# Patient Record
Sex: FEMALE | Race: WHITE | NOT HISPANIC OR LATINO | Employment: OTHER | URBAN - METROPOLITAN AREA
[De-identification: names, ages, dates, MRNs, and addresses within clinical notes are randomized per-mention and may not be internally consistent; named-entity substitution may affect disease eponyms.]

---

## 2017-01-16 ENCOUNTER — HOSPITAL ENCOUNTER (EMERGENCY)
Facility: HOSPITAL | Age: 82
Discharge: HOME | End: 2017-01-16
Attending: EMERGENCY MEDICINE | Admitting: EMERGENCY MEDICINE

## 2017-01-16 VITALS
HEIGHT: 60 IN | WEIGHT: 101 LBS | DIASTOLIC BLOOD PRESSURE: 63 MMHG | OXYGEN SATURATION: 96 % | RESPIRATION RATE: 18 BRPM | HEART RATE: 66 BPM | BODY MASS INDEX: 19.83 KG/M2 | SYSTOLIC BLOOD PRESSURE: 142 MMHG | TEMPERATURE: 99 F

## 2017-01-16 DIAGNOSIS — R55 VASOVAGAL SYNCOPE: Primary | ICD-10-CM

## 2017-01-16 DIAGNOSIS — E78.5 HYPERLIPEMIA: ICD-10-CM

## 2017-01-16 DIAGNOSIS — E86.0 DEHYDRATION: ICD-10-CM

## 2017-01-16 DIAGNOSIS — N18.9 CHRONIC RENAL FAILURE, UNSPECIFIED STAGE: ICD-10-CM

## 2017-01-16 LAB
ALBUMIN SERPL-MCNC: 4.2 G/DL (ref 3.5–5.2)
ALBUMIN/GLOB SERPL: 1.4 G/DL
ALP SERPL-CCNC: 74 U/L (ref 39–117)
ALT SERPL W P-5'-P-CCNC: 12 U/L (ref 1–33)
ANION GAP SERPL CALCULATED.3IONS-SCNC: 10.7 MMOL/L
AST SERPL-CCNC: 24 U/L (ref 1–32)
BASOPHILS # BLD AUTO: 0.04 10*3/MM3 (ref 0–0.2)
BASOPHILS NFR BLD AUTO: 0.6 % (ref 0–1.5)
BILIRUB SERPL-MCNC: 0.6 MG/DL (ref 0.1–1.2)
BUN BLD-MCNC: 35 MG/DL (ref 8–23)
BUN/CREAT SERPL: 17.5 (ref 7–25)
CALCIUM SPEC-SCNC: 9.2 MG/DL (ref 8.6–10.5)
CHLORIDE SERPL-SCNC: 97 MMOL/L (ref 98–107)
CO2 SERPL-SCNC: 30.3 MMOL/L (ref 22–29)
CREAT BLD-MCNC: 2 MG/DL (ref 0.57–1)
DEPRECATED RDW RBC AUTO: 49.7 FL (ref 37–54)
EOSINOPHIL # BLD AUTO: 0.13 10*3/MM3 (ref 0–0.7)
EOSINOPHIL NFR BLD AUTO: 2 % (ref 0.3–6.2)
ERYTHROCYTE [DISTWIDTH] IN BLOOD BY AUTOMATED COUNT: 13.5 % (ref 11.7–13)
GFR SERPL CREATININE-BSD FRML MDRD: 24 ML/MIN/1.73
GLOBULIN UR ELPH-MCNC: 2.9 GM/DL
GLUCOSE BLD-MCNC: 210 MG/DL (ref 65–99)
GLUCOSE BLDC GLUCOMTR-MCNC: 90 MG/DL (ref 70–130)
HCT VFR BLD AUTO: 40.8 % (ref 35.6–45.5)
HGB BLD-MCNC: 12.8 G/DL (ref 11.9–15.5)
HOLD SPECIMEN: NORMAL
HOLD SPECIMEN: NORMAL
IMM GRANULOCYTES # BLD: 0 10*3/MM3 (ref 0–0.03)
IMM GRANULOCYTES NFR BLD: 0 % (ref 0–0.5)
LYMPHOCYTES # BLD AUTO: 1.77 10*3/MM3 (ref 0.9–4.8)
LYMPHOCYTES NFR BLD AUTO: 27.4 % (ref 19.6–45.3)
MAGNESIUM SERPL-MCNC: 2.2 MG/DL (ref 1.6–2.4)
MCH RBC QN AUTO: 31.5 PG (ref 26.9–32)
MCHC RBC AUTO-ENTMCNC: 31.4 G/DL (ref 32.4–36.3)
MCV RBC AUTO: 100.5 FL (ref 80.5–98.2)
MONOCYTES # BLD AUTO: 0.63 10*3/MM3 (ref 0.2–1.2)
MONOCYTES NFR BLD AUTO: 9.8 % (ref 5–12)
NEUTROPHILS # BLD AUTO: 3.89 10*3/MM3 (ref 1.9–8.1)
NEUTROPHILS NFR BLD AUTO: 60.2 % (ref 42.7–76)
PLATELET # BLD AUTO: 132 10*3/MM3 (ref 140–500)
PMV BLD AUTO: 9.6 FL (ref 6–12)
POTASSIUM BLD-SCNC: 3.4 MMOL/L (ref 3.5–5.2)
PROT SERPL-MCNC: 7.1 G/DL (ref 6–8.5)
RBC # BLD AUTO: 4.06 10*6/MM3 (ref 3.9–5.2)
SODIUM BLD-SCNC: 138 MMOL/L (ref 136–145)
TROPONIN T SERPL-MCNC: <0.01 NG/ML (ref 0–0.03)
WBC NRBC COR # BLD: 6.46 10*3/MM3 (ref 4.5–10.7)
WHOLE BLOOD HOLD SPECIMEN: NORMAL
WHOLE BLOOD HOLD SPECIMEN: NORMAL

## 2017-01-16 PROCEDURE — 82962 GLUCOSE BLOOD TEST: CPT

## 2017-01-16 PROCEDURE — 93005 ELECTROCARDIOGRAM TRACING: CPT | Performed by: EMERGENCY MEDICINE

## 2017-01-16 PROCEDURE — 93010 ELECTROCARDIOGRAM REPORT: CPT | Performed by: INTERNAL MEDICINE

## 2017-01-16 PROCEDURE — 83735 ASSAY OF MAGNESIUM: CPT | Performed by: EMERGENCY MEDICINE

## 2017-01-16 PROCEDURE — 80053 COMPREHEN METABOLIC PANEL: CPT | Performed by: EMERGENCY MEDICINE

## 2017-01-16 PROCEDURE — 84484 ASSAY OF TROPONIN QUANT: CPT | Performed by: EMERGENCY MEDICINE

## 2017-01-16 PROCEDURE — 96360 HYDRATION IV INFUSION INIT: CPT

## 2017-01-16 PROCEDURE — 85025 COMPLETE CBC W/AUTO DIFF WBC: CPT | Performed by: EMERGENCY MEDICINE

## 2017-01-16 PROCEDURE — 96361 HYDRATE IV INFUSION ADD-ON: CPT

## 2017-01-16 PROCEDURE — 99284 EMERGENCY DEPT VISIT MOD MDM: CPT

## 2017-01-16 RX ORDER — SODIUM CHLORIDE 0.9 % (FLUSH) 0.9 %
10 SYRINGE (ML) INJECTION AS NEEDED
Status: DISCONTINUED | OUTPATIENT
Start: 2017-01-16 | End: 2017-01-16 | Stop reason: HOSPADM

## 2017-01-16 RX ORDER — ROSUVASTATIN CALCIUM 5 MG/1
TABLET, COATED ORAL
Qty: 15 TABLET | Refills: 0 | Status: SHIPPED | OUTPATIENT
Start: 2017-01-16 | End: 2017-02-21 | Stop reason: SDUPTHER

## 2017-01-16 RX ORDER — POTASSIUM CHLORIDE 1.5 G/1.77G
20 POWDER, FOR SOLUTION ORAL ONCE
Status: COMPLETED | OUTPATIENT
Start: 2017-01-16 | End: 2017-01-16

## 2017-01-16 RX ADMIN — POTASSIUM CHLORIDE 20 MEQ: 1.5 POWDER, FOR SOLUTION ORAL at 13:06

## 2017-01-16 RX ADMIN — SODIUM CHLORIDE 1000 ML: 9 INJECTION, SOLUTION INTRAVENOUS at 12:48

## 2017-01-16 RX ADMIN — SODIUM CHLORIDE 500 ML: 9 INJECTION, SOLUTION INTRAVENOUS at 12:13

## 2017-01-16 NOTE — ED PROVIDER NOTES
" EMERGENCY DEPARTMENT ENCOUNTER    CHIEF COMPLAINT  Chief Complaint: Dizziness  History given by: Pt  History limited by: N/A  Room Number: 12/12  PMD: Louis Valdez MD      HPI:  Pt is a 85 y.o. female who presents complaining of dizziness and syncopal episode earlier this morning. Pt's spouse reports the pt was \"wobbling\" this morning and he had to help her to the floor. Spouse estimates the pt experienced a syncopal episode lasting 5-10 seconds. He denies head injury on the pt's behalf during the event. Spouse reports several similar episodes over the past month. He checked the pt's BP following the event, which was 70/56. It was rechecked shortly later when it was 90/60. Pt has a history of hypertension, but has not taken medication today. Her most recent dose was last night. She reports eating a meal last night and this morning. Pt states she felt fine when she woke this morning and does not recall experiencing light-headedness, diaphoresis, or any other symptoms prior to syncope. She also denies black/bloody stools, nausea, and vomiting. Pt's spouse also notes recent chest congestion and cough on the pt's behalf, which seemed to have resolved. Pt states she feels fine currently, believes she is at baseline, and did not think she needed to come here today. Pt does not use tobacco or alcohol.  is her nephrologist and Dr. Sonu Yip is her Cardiologist. She takes Coreg and Nifedipine 30 mg twice per day.    Duration: Occurred earlier today  Onset: Sudden  Timing: Single episode  Location: Neurological  Radiation: None  Quality: Syncope  Intensity/Severity: Mild  Progression: Resolved currently  Associated Symptoms: Dizziness  Aggravating Factors: Nothing  Alleviating Factors: None tried  Previous Episodes: Yes, several similar episodes over the past month  Treatment before arrival: None specified    PAST MEDICAL HISTORY  Active Ambulatory Problems     Diagnosis Date Noted   • Atypical chest pain " 03/23/2016   • Benign essential HTN 03/23/2016   • Bronchitis 03/23/2016   • Atherosclerosis of coronary artery 03/23/2016   • Carpal tunnel syndrome 03/23/2016   • Fatigue 03/23/2016   • Arthralgia of hip 03/23/2016   • HLD (hyperlipidemia) 03/23/2016   • Amyotrophia 03/23/2016   • Adult hypothyroidism 03/23/2016   • Amnesia 03/23/2016   • Pharyngeal inflammation 03/23/2016   • Basal cell papilloma 03/23/2016   • Forgetfulness 03/23/2016   • Chronic kidney disease, stage III (moderate) 06/24/2016   • Night sweats 08/22/2016   • Hot flashes 08/22/2016   • Urine frequency 08/22/2016   • Gastroesophageal reflux disease with esophagitis 10/13/2016   • Dyslipidemia 10/13/2016   • Chronic gout 10/13/2016     Resolved Ambulatory Problems     Diagnosis Date Noted   • BP (high blood pressure) 03/23/2016     Past Medical History   Diagnosis Date   • Chronic kidney disease    • Coronary atherosclerosis    • Hypercholesteremia    • Hypertension        PAST SURGICAL HISTORY  Past Surgical History   Procedure Laterality Date   • Partial hysterectomy         FAMILY HISTORY  History reviewed. No pertinent family history.    SOCIAL HISTORY  Social History     Social History   • Marital status:      Spouse name: N/A   • Number of children: N/A   • Years of education: N/A     Occupational History   • Not on file.     Social History Main Topics   • Smoking status: Never Smoker   • Smokeless tobacco: Not on file   • Alcohol use No   • Drug use: Not on file   • Sexual activity: Not on file     Other Topics Concern   • Not on file     Social History Narrative       ALLERGIES  Statins and Sulfa antibiotics    REVIEW OF SYSTEMS  Review of Systems   Constitutional: Negative for chills and fever.   HENT: Negative for sore throat and trouble swallowing.    Eyes: Negative for visual disturbance.   Respiratory: Negative for cough and shortness of breath.    Cardiovascular: Negative for chest pain, palpitations and leg swelling.    Gastrointestinal: Negative for abdominal pain, diarrhea and vomiting.   Endocrine: Negative.    Genitourinary: Negative for decreased urine volume, dysuria and frequency.   Musculoskeletal: Negative for neck pain.   Skin: Negative for rash.   Allergic/Immunologic: Negative.    Neurological: Positive for dizziness and syncope. Negative for weakness, light-headedness, numbness and headaches.   Hematological: Negative.    Psychiatric/Behavioral: Negative.    All other systems reviewed and are negative.      PHYSICAL EXAM  ED Triage Vitals   Temp Heart Rate Resp BP SpO2   01/16/17 1112 01/16/17 1112 01/16/17 1112 01/16/17 1122 01/16/17 1112   99 °F (37.2 °C) 72 16 155/64 97 %      Temp src Heart Rate Source Patient Position BP Location FiO2 (%)   -- -- 01/16/17 1122 01/16/17 1122 --     Sitting Right arm        Physical Exam   Constitutional: She is oriented to person, place, and time and well-developed, well-nourished, and in no distress. No distress.   HENT:   Head: Normocephalic and atraumatic.   Mouth/Throat: Oropharynx is clear and moist.   Eyes: EOM are normal. Pupils are equal, round, and reactive to light.   Conjunctiva not pale   Neck: Normal range of motion. Neck supple.   Cardiovascular: Normal rate, regular rhythm and normal heart sounds.    No murmur heard.  Pulmonary/Chest: Effort normal and breath sounds normal. No respiratory distress.   Abdominal: Soft. There is no tenderness. There is no rebound and no guarding.   Genitourinary: Rectal exam shows guaiac negative stool.   Genitourinary Comments: Pt has normal rectal tone and heme negative, normal stool.   Musculoskeletal: Normal range of motion. She exhibits no edema.   Neurological: She is alert and oriented to person, place, and time. She has normal sensation and normal strength.   Skin: Skin is warm and dry. No rash noted.   Psychiatric: Mood and affect normal.   Nursing note and vitals reviewed.      LAB RESULTS  Lab Results (last 24 hours)      Procedure Component Value Units Date/Time    CBC & Differential [[id number]] Collected:  01/16/17 1128    Specimen:  Blood Updated:  01/16/17 1158    Narrative:       The following orders were created for panel order CBC & Differential.  Procedure                               Abnormality         Status                     ---------                               -----------         ------                     CBC Auto Differential[[id number]]         Abnormal            Final result                 Please view results for these tests on the individual orders.    Comprehensive Metabolic Panel [[id number]]  (Abnormal) Collected:  01/16/17 1128    Specimen:  Blood Updated:  01/16/17 1213     Glucose 210 (H) mg/dL      BUN 35 (H) mg/dL      Creatinine 2.00 (H) mg/dL      Sodium 138 mmol/L      Potassium 3.4 (L) mmol/L      Chloride 97 (L) mmol/L      CO2 30.3 (H) mmol/L      Calcium 9.2 mg/dL      Total Protein 7.1 g/dL      Albumin 4.20 g/dL      ALT (SGPT) 12 U/L      AST (SGOT) 24 U/L      Alkaline Phosphatase 74 U/L      Total Bilirubin 0.6 mg/dL      eGFR Non African Amer 24 (L) mL/min/1.73      Globulin 2.9 gm/dL      A/G Ratio 1.4 g/dL      BUN/Creatinine Ratio 17.5      Anion Gap 10.7 mmol/L     Narrative:       The MDRD GFR formula is only valid for adults with stable renal function between ages 18 and 70.    Magnesium [[id number]]  (Normal) Collected:  01/16/17 1128    Specimen:  Blood Updated:  01/16/17 1213     Magnesium 2.2 mg/dL     Troponin [[id number]]  (Normal) Collected:  01/16/17 1128    Specimen:  Blood Updated:  01/16/17 1213     Troponin T <0.010 ng/mL     Narrative:       Troponin T Reference Ranges:  Less than 0.03 ng/mL:    Negative for AMI  0.03 to 0.09 ng/mL:      Indeterminant for AMI  Greater than 0.09 ng/mL: Positive for AMI    CBC Auto Differential [[id number]]  (Abnormal) Collected:  01/16/17 1128    Specimen:  Blood Updated:  01/16/17 1158     WBC 6.46 10*3/mm3      RBC 4.06 10*6/mm3      Hemoglobin  12.8 g/dL      Hematocrit 40.8 %      .5 (H) fL      MCH 31.5 pg      MCHC 31.4 (L) g/dL      RDW 13.5 (H) %      RDW-SD 49.7 fl      MPV 9.6 fL      Platelets 132 (L) 10*3/mm3      Neutrophil % 60.2 %      Lymphocyte % 27.4 %      Monocyte % 9.8 %      Eosinophil % 2.0 %      Basophil % 0.6 %      Immature Grans % 0.0 %      Neutrophils, Absolute 3.89 10*3/mm3      Lymphocytes, Absolute 1.77 10*3/mm3      Monocytes, Absolute 0.63 10*3/mm3      Eosinophils, Absolute 0.13 10*3/mm3      Basophils, Absolute 0.04 10*3/mm3      Immature Grans, Absolute 0.00 10*3/mm3     POC Glucose Fingerstick [[id number]]  (Normal) Collected:  01/16/17 1347    Specimen:  Blood Updated:  01/16/17 1359     Glucose 90 mg/dL     Narrative:       Meter: [id number] : [id number] Gio Herrera I ordered the above labs and reviewed the results    EKG         EKG time: 11:45 AM  Rhythm/Rate: NSR at 61 bpm w/ PACs  P waves and ID: Normal  QRS, axis: LAD  ST and T waves: Normal  Interpreted contemporaneously by me and independently viewed.  Changed compared to prior (June 16th, 2016), LAD is new.    PROCEDURES  Procedures      PROGRESS AND CONSULTS  ED Course   11:30 AM:  Vitals: BP: 155/64 HR: 72 Temp: 99 °F (37.2 °C) O2 sat: 97%  D/w pt plan for labs and EKG for further evaluation. Ordered IVF for hydration. Pt understands and agrees with the plan, all questions answered.    12:46 PM:  Tech reports pt was orthostatic.    1:01 PM:  Vitals: BP: 136/55 HR: 66 Temp: 99 °F (37.2 °C) O2 sat: 97%  Rechecked pt. Pt is resting comfortably. Discussed that since the pt was orthostatic I need to perform a rectal exam. Performed rectal exam, which showed normal tone and heme negative, normal stool. Pt understands and agrees with the plan, all questions answered.    2:02 PM:  Pt's orthostatics are normal.    MEDICAL DECISION MAKING  Results were reviewed/discussed with the patient and they were also made aware of online access. Pt also  made aware that some labs, such as cultures, will not be resulted during ER visit and follow up with PMD is necessary.     MDM  Number of Diagnoses or Management Options     Amount and/or Complexity of Data Reviewed  Clinical lab tests: ordered and reviewed  Tests in the medicine section of CPT®: ordered and reviewed  Obtain history from someone other than the patient: yes  Independent visualization of images, tracings, or specimens: yes    Patient Progress  Patient progress: stable         DIAGNOSIS  Final diagnoses:   Vasovagal syncope   Dehydration   Chronic renal failure, unspecified stage       DISPOSITION  DISCHARGE    Patient discharged in stable condition.    Reviewed implications of results, diagnosis, meds, responsibility to follow up, warning signs and symptoms of possible worsening, potential complications and reasons to return to ER, including any new or worsening symptoms.    Patient/Family voiced understanding of above instructions.    Discussed plan for discharge, as there is no emergent indication for admission.  Pt/family is agreeable and understands need for follow up and repeat testing.  Pt is aware that discharge does not mean that nothing is wrong but it indicates no emergency is present that requires admission and they must continue care with follow-up as given below or physician of their choice.     FOLLOW-UP  Louis Valdez MD  [address] [id number] [phone]    Schedule an appointment as soon as possible for a visit      Sonu Yip Jr., MD  [address] [id number] [phone]    Schedule an appointment as soon as possible for a visit  If symptoms worsen         Medication List      Changed          carvedilol 25 MG tablet   Commonly known as:  COREG   TAKE ONE TABLET BY MOUTH TWICE A DAY   What changed:  Another medication with the same name was removed. Continue   taking this medication, and follow the directions you see here.          Stop          CRESTOR 5 MG tablet   Generic drug:  rosuvastatin       nitrofurantoin (macrocrystal-monohydrate) 100 MG capsule   Commonly known as:  MACROBID           Latest Documented Vital Signs:  As of 8:12 PM  BP- 142/63 HR- 66 Temp- 99 °F (37.2 °C) O2 sat- 96%    --  Documentation assistance provided by chris Westbrook for Dr. Farnsworth.  Information recorded by the scribe was done at my direction and has been verified and validated by me.     Anson Westbrook  01/16/17 1418       Omar Farnsworth MD  01/16/17 2013

## 2017-01-16 NOTE — DISCHARGE INSTRUCTIONS
Hold your procardia tonight but restart it tomorrow.  Drink plenty of fluids and rest today.  Please return to the ED if symptoms return, especially if you pass out.

## 2017-01-18 ENCOUNTER — TELEPHONE (OUTPATIENT)
Dept: SOCIAL WORK | Facility: HOSPITAL | Age: 82
End: 2017-01-18

## 2017-01-18 NOTE — TELEPHONE ENCOUNTER
ER F/U phone call:   Pt states that she is doing well, to see cardiologist 1-20-17. No other questions or concerns voiced at this time. Samaria Ramirez RN\

## 2017-01-20 ENCOUNTER — OFFICE VISIT (OUTPATIENT)
Dept: CARDIOLOGY | Facility: CLINIC | Age: 82
End: 2017-01-20

## 2017-01-20 VITALS
DIASTOLIC BLOOD PRESSURE: 68 MMHG | SYSTOLIC BLOOD PRESSURE: 140 MMHG | BODY MASS INDEX: 19.83 KG/M2 | HEIGHT: 60 IN | WEIGHT: 101 LBS | HEART RATE: 68 BPM

## 2017-01-20 DIAGNOSIS — E78.2 MIXED HYPERLIPIDEMIA: ICD-10-CM

## 2017-01-20 DIAGNOSIS — R53.82 CHRONIC FATIGUE: ICD-10-CM

## 2017-01-20 DIAGNOSIS — E03.9 ADULT HYPOTHYROIDISM: ICD-10-CM

## 2017-01-20 DIAGNOSIS — K21.00 GASTROESOPHAGEAL REFLUX DISEASE WITH ESOPHAGITIS: ICD-10-CM

## 2017-01-20 DIAGNOSIS — R23.2 HOT FLASHES: ICD-10-CM

## 2017-01-20 DIAGNOSIS — R55 SYNCOPE, UNSPECIFIED SYNCOPE TYPE: Primary | ICD-10-CM

## 2017-01-20 DIAGNOSIS — I10 BENIGN ESSENTIAL HTN: ICD-10-CM

## 2017-01-20 DIAGNOSIS — I25.10 ATHEROSCLEROSIS OF NATIVE CORONARY ARTERY OF NATIVE HEART WITHOUT ANGINA PECTORIS: ICD-10-CM

## 2017-01-20 PROCEDURE — 93000 ELECTROCARDIOGRAM COMPLETE: CPT | Performed by: INTERNAL MEDICINE

## 2017-01-20 PROCEDURE — 99214 OFFICE O/P EST MOD 30 MIN: CPT | Performed by: INTERNAL MEDICINE

## 2017-01-20 NOTE — PROGRESS NOTES
[facility]  Cardiology Office Visit Note        Subjective:     Encounter Date:2017      Patient ID: Christine Wharton   Age: 85 y.o.  Sex: female  :  1932  MRN: [id number]             Date of Office Visit: 2017  Encounter Provider: Yaquelin Vogel MD  Place of Service: [facility]     .    Chief Complaint:  History of Present Illness    The following portions of the patient's history were reviewed and updated as appropriate: allergies, current medications, past family history, past medical history, past social history, past surgical history and problem list.    Review of Systems   Constitution: Negative for weight gain.   HENT: Negative for congestion, headaches, hearing loss and sore throat.    Eyes: Negative for blurred vision.   Cardiovascular: Positive for near-syncope and syncope. Negative for chest pain, claudication, cyanosis, dyspnea on exertion, irregular heartbeat, leg swelling, orthopnea, palpitations and paroxysmal nocturnal dyspnea.   Respiratory: Negative for cough, shortness of breath and snoring.    Endocrine: Negative for cold intolerance.   Hematologic/Lymphatic: Negative for adenopathy. Does not bruise/bleed easily.   Skin: Negative for rash.   Musculoskeletal: Negative for back pain.   Gastrointestinal: Negative for abdominal pain, change in bowel habit, constipation and diarrhea.   Genitourinary: Negative for dysuria, frequency, hematuria and hesitancy.   Neurological: Positive for dizziness and loss of balance. Negative for excessive daytime sleepiness and focal weakness.   Psychiatric/Behavioral: Positive for memory loss. The patient is nervous/anxious.    Allergic/Immunologic: Negative for hives.   All other systems reviewed and are negative.        ECG 12 Lead  Date/Time: 2017 7:48 PM  Performed by: YAQUELIN VOGEL JR  Authorized by: YAQUELIN VOGEL JR   Comparison: compared with previous ECG   Similar to  previous ECG  Rhythm: sinus rhythm  BPM: 63  Other findings: PRWP  Q waves: V1 and V2  Clinical impression: abnormal ECG                       HPI     The patient is a 85-year-old white female with history of coronary artery disease, with severe stenosis of a nondominant right coronary artery and severe stenosis of an obtuse marginal by cardiac catheterization, history of hypertension, hyperlipidemia, hypothyroidism, carotid stenoses, presents for cardiac follow-up.  LS her in the office in May 2016.  She underwent a routine treadmill test to evaluate for progression of coronary artery disease.  The ECG and Cardiolite portion of the chest were normal.  Ejection fraction 73%.  Her last echocardiogram was performed in November 2014 showing normal left turgor systolic function, mild aortic regurgitation and diastolic dysfunctional left ventricle.  Her last ECG was performed a few days ago in the emergency room when she was seen for a syncopal episode.  That ECG shows sinus rhythm.  She apparently has passed out 3 times in the past 5 weeks and was caught by her  each time, who saw her wobble.  She does not remember any symptoms before the episodes other than her vision blurring or darkening..  She has a very poor memory.  She does not remember any sweating or shortness of breath or palpitations or chest pain.  No vertigo.    The first episode occurred while she was sitting at the kitchen table.  The second episode occurred while she was combing her hair in the bathroom, standing up.  The third episode occurred 4 days ago which went to the emergency room.  She was standing in a sink in the kitchen and her  noticed that she was unsteady and then she fell for any stopped her fall.    Cardiac review systems: No chest pain.  No PND, orthopnea or pedal edema.  No palpitations.    Cardiac risk factors: Positive for hyperlipidemia, hypertension.  No diabetes.  Positive family history early CAD.  No  "smoking.            Past Medical History   Diagnosis Date   • Chronic kidney disease      Stage 3 (moderate)   • Coronary atherosclerosis      CAD   • Hypercholesteremia    • Hypertension        Past Surgical History   Procedure Laterality Date   • Partial hysterectomy         Social History     Social History   • Marital status:      Spouse name: N/A   • Number of children: N/A   • Years of education: N/A     Occupational History   • Not on file.     Social History Main Topics   • Smoking status: Never Smoker   • Smokeless tobacco: Not on file   • Alcohol use No   • Drug use: Not on file   • Sexual activity: Not on file     Other Topics Concern   • Not on file     Social History Narrative       No family history on file.        Scheduled Meds:  Current Outpatient Prescriptions on File Prior to Visit   Medication Sig Dispense Refill   • allopurinol (ZYLOPRIM) 100 MG tablet Take 1 tablet by mouth daily. 90 tablet 1   • aspirin 81 MG tablet Take 1 tablet by mouth daily.     • carvedilol (COREG) 25 MG tablet TAKE ONE TABLET BY MOUTH TWICE A  tablet 0   • carvedilol (COREG) 25 MG tablet Take 25 mg by mouth 3 (Three) Times a Day.     • chlorthalidone (HYGROTEN) 25 MG tablet 12.5 mg Daily.     • cholecalciferol (VITAMIN D3) 1000 UNITS tablet Take 1 tablet by mouth daily.     • NIFEdipine XL (PROCARDIA XL) 30 MG 24 hr tablet TAKE ONE TABLET BY MOUTH TWICE A DAY 60 tablet 3   • nitrofurantoin, macrocrystal-monohydrate, (MACROBID) 100 MG capsule Take 1 capsule by mouth 2 (two) times a day. 14 capsule 0   • Potassium Chloride 40 MEQ/15ML (20%) solution Take 15 mL by mouth Daily. 600 mL 3   • rosuvastatin (CRESTOR) 5 MG tablet TAKE ONE-HALF TABLET BY MOUTH DAILY 15 tablet 0   • SYNTHROID 125 MCG tablet TAKE ONE-HALF TABLET BY MOUTH DAILY 45 tablet 2     No current facility-administered medications on file prior to visit.        Visit Vitals   • /68   • Pulse 68   • Ht 60\" (152.4 cm)   • Wt 101 lb (45.8 kg) "   • BMI 19.73 kg/m2       Objective:     Physical Exam   Constitutional: She appears well-developed. She appears distressed.   HENT:   Head: Normocephalic and atraumatic.   Right Ear: External ear normal.   Left Ear: External ear normal.   Mouth/Throat: Oropharynx is clear and moist. No oropharyngeal exudate.   Eyes: Conjunctivae and EOM are normal. Pupils are equal, round, and reactive to light. No scleral icterus.   Neck: Normal range of motion. Neck supple. No JVD present. No tracheal deviation present. No thyromegaly present.   Cardiovascular: Normal rate, regular rhythm, S1 normal, S2 normal, normal heart sounds and intact distal pulses.  PMI is not displaced.  Exam reveals no gallop, no distant heart sounds, no friction rub and no decreased pulses.    No murmur heard.  1/6 systolic murmur at the left sternal border     Pulmonary/Chest: Effort normal and breath sounds normal. No accessory muscle usage. No respiratory distress. She has no wheezes. She has no rales. She exhibits no tenderness.   Abdominal: Soft. Bowel sounds are normal. She exhibits no distension and no mass. There is no tenderness. There is no rebound and no guarding.   Musculoskeletal: Normal range of motion. She exhibits no edema, tenderness or deformity.   Lymphadenopathy:     She has no cervical adenopathy.   Neurological: She is alert. She has normal reflexes. No cranial nerve deficit. Coordination normal.   Skin: Skin is dry. No rash noted. She is not diaphoretic. No erythema. No pallor.   Psychiatric: She has a normal mood and affect.             Lab Review:               Lab Review:         Lab Review     Lab Results   Component Value Date    CHOL 156 10/14/2016    CHOL 156 05/19/2016     Lab Results   Component Value Date    HDL 68 (H) 10/14/2016    HDL 72 (H) 08/18/2016    HDL 69 (H) 05/19/2016     Lab Results   Component Value Date    LDL 77 08/18/2016     (H) 05/06/2015     Lab Results   Component Value Date    TRIG 71  10/14/2016    TRIG 68 08/18/2016    TRIG 100 05/19/2016     No components found for: CHOLHDL  Lab Results   Component Value Date    GLUCOSE 210 (H) 01/16/2017    BUN 35 (H) 01/16/2017    CREATININE 2.00 (H) 01/16/2017    EGFRIFNONA 24 (L) 01/16/2017    EGFRIFAFRI 41 (L) 08/18/2016    BCR 17.5 01/16/2017    CO2 30.3 (H) 01/16/2017    CALCIUM 9.2 01/16/2017    PROTENTOTREF 7.0 08/18/2016    ALBUMIN 4.20 01/16/2017    LABIL2 1.4 01/16/2017    AST 24 01/16/2017    ALT 12 01/16/2017     Lab Results   Component Value Date    GLUCOSE 210 (H) 01/16/2017    CALCIUM 9.2 01/16/2017     01/16/2017    K 3.4 (L) 01/16/2017    CO2 30.3 (H) 01/16/2017    CL 97 (L) 01/16/2017    BUN 35 (H) 01/16/2017    CREATININE 2.00 (H) 01/16/2017    EGFRIFAFRI 41 (L) 08/18/2016    EGFRIFNONA 24 (L) 01/16/2017    BCR 17.5 01/16/2017    ANIONGAP 10.7 01/16/2017     Lab Results   Component Value Date    WBC 6.46 01/16/2017    HGB 12.8 01/16/2017    HCT 40.8 01/16/2017    .5 (H) 01/16/2017     (L) 01/16/2017     No results found for: DDIMER  Lab Results   Component Value Date    TSH 3.470 08/18/2016     Lab Results   Component Value Date    CKTOTAL 71 05/19/2016     No results found for: DIGOXIN  Lab Results   Component Value Date    CKTOTAL 71 05/19/2016    TROPONINT <0.010 01/16/2017     No results found for: INR, PROTIME  Estimated Creatinine Clearance: 14.8 mL/min (by C-G formula based on Cr of 2).    Assessment:          Diagnosis Plan   1. Syncope, unspecified syncope type  ECG 12 Lead   2. Atherosclerosis of native coronary artery of native heart without angina pectoris     3. Benign essential HTN     4. Mixed hyperlipidemia     5. Gastroesophageal reflux disease with esophagitis     6. Adult hypothyroidism     7. Hot flashes     8. Chronic fatigue       The patient is complaining of dizziness a Cydney syncope.  She really has little warning.  I told her she needs to get to the floor so that she can lie flat.  She needs to  put on some weight.  She weighs 102 pounds now.  She weighed 110 pounds a couple months ago.  She used to weigh 125 pounds.  She's lost her appetite.  She needs to drink more fluid mostly with electrolytes such as Gatorade.  She can drink Ensure.  I may need to give her compression stockings.    I will stop her chlorthalidone.  She can take it when necessary edema.  I will also stop the potassium supplement.    I will obtain blood work to evaluate the symptoms    I will get an echocardiogram.    Her blood pressure illness been as low as 74/52.  She doesn't take her Coreg or blood pressure medications when her blood pressure was too low.  I will also stop one dose of nifedipine.  Her heart rate runs in the 50s and 60s.  Make a back on the Coreg as well.  She'll call back in a couple weeks with a blood pressure and heart rate readings.       Assessment and Plan:    Christine was seen today for hypertension and coronary artery disease.    Diagnoses and all orders for this visit:    Syncope, unspecified syncope type  -     ECG 12 Lead    Atherosclerosis of native coronary artery of native heart without angina pectoris    Benign essential HTN    Mixed hyperlipidemia    Gastroesophageal reflux disease with esophagitis    Adult hypothyroidism    Hot flashes    Chronic fatigue      A total of 25 minutes was spent in the care of this patient, including at least 13 minutes face-to-face with the patient.    I not only counseled the patient today on the significant factors noted in the assessment and plan, but I also recommended that the patient reduce salt and saturated animal fat intake in diet, as well as to perform scheduled exercise on a regular basis.      Plan:                  01/20/2017  3:44 PM  MD Sonu Qiu MD  2/1/2017, 3:44 PM

## 2017-02-01 ENCOUNTER — TELEPHONE (OUTPATIENT)
Dept: CARDIOLOGY | Facility: CLINIC | Age: 82
End: 2017-02-01

## 2017-02-01 ENCOUNTER — HOSPITAL ENCOUNTER (EMERGENCY)
Facility: HOSPITAL | Age: 82
Discharge: LEFT BEFORE DISCHARGE | End: 2017-02-01

## 2017-02-01 VITALS
HEIGHT: 60 IN | WEIGHT: 118 LBS | SYSTOLIC BLOOD PRESSURE: 193 MMHG | TEMPERATURE: 98.6 F | RESPIRATION RATE: 16 BRPM | BODY MASS INDEX: 23.16 KG/M2 | DIASTOLIC BLOOD PRESSURE: 92 MMHG | HEART RATE: 67 BPM | OXYGEN SATURATION: 98 %

## 2017-02-01 PROCEDURE — 99211 OFF/OP EST MAY X REQ PHY/QHP: CPT

## 2017-02-01 NOTE — TELEPHONE ENCOUNTER
Per Dr. Yip increase Procardia XL 30mg to twice a day. Called patient and informed they are already at the ER bc they re checked the BP and it was almost 200 on the top. They will inform ER we called with these instructions and see if anything else is advised.

## 2017-02-01 NOTE — TELEPHONE ENCOUNTER
Patient called she states her BP has been going up 189-82 today and been this way for the past 3 days. Does not want to go to the ER. She says you changed her medications around. She wasn't sure what she should do. I told her that if she has any SOB or CP to go to the ER or Urgent care to be evaluated. She voiced understanding, but wants Dr. Yip to have a message and let her know what she should do. Please advise?

## 2017-02-05 ENCOUNTER — APPOINTMENT (OUTPATIENT)
Dept: CT IMAGING | Facility: HOSPITAL | Age: 82
End: 2017-02-05

## 2017-02-05 ENCOUNTER — HOSPITAL ENCOUNTER (EMERGENCY)
Facility: HOSPITAL | Age: 82
Discharge: HOME | End: 2017-02-05
Attending: EMERGENCY MEDICINE | Admitting: EMERGENCY MEDICINE

## 2017-02-05 VITALS
SYSTOLIC BLOOD PRESSURE: 137 MMHG | HEIGHT: 61 IN | OXYGEN SATURATION: 94 % | WEIGHT: 115 LBS | RESPIRATION RATE: 16 BRPM | HEART RATE: 66 BPM | DIASTOLIC BLOOD PRESSURE: 65 MMHG | TEMPERATURE: 98.3 F | BODY MASS INDEX: 21.71 KG/M2

## 2017-02-05 DIAGNOSIS — R42 DIZZINESS: Primary | ICD-10-CM

## 2017-02-05 LAB
ANION GAP SERPL CALCULATED.3IONS-SCNC: 14.2 MMOL/L
BASOPHILS # BLD AUTO: 0.04 10*3/MM3 (ref 0–0.2)
BASOPHILS NFR BLD AUTO: 0.7 % (ref 0–1.5)
BUN BLD-MCNC: 27 MG/DL (ref 8–23)
BUN/CREAT SERPL: 18.9 (ref 7–25)
CALCIUM SPEC-SCNC: 9.2 MG/DL (ref 8.6–10.5)
CHLORIDE SERPL-SCNC: 101 MMOL/L (ref 98–107)
CO2 SERPL-SCNC: 23.8 MMOL/L (ref 22–29)
CREAT BLD-MCNC: 1.43 MG/DL (ref 0.57–1)
DEPRECATED RDW RBC AUTO: 47.7 FL (ref 37–54)
EOSINOPHIL # BLD AUTO: 0.18 10*3/MM3 (ref 0–0.7)
EOSINOPHIL NFR BLD AUTO: 2.9 % (ref 0.3–6.2)
ERYTHROCYTE [DISTWIDTH] IN BLOOD BY AUTOMATED COUNT: 13.5 % (ref 11.7–13)
GFR SERPL CREATININE-BSD FRML MDRD: 35 ML/MIN/1.73
GLUCOSE BLD-MCNC: 120 MG/DL (ref 65–99)
HCT VFR BLD AUTO: 39.8 % (ref 35.6–45.5)
HGB BLD-MCNC: 13.2 G/DL (ref 11.9–15.5)
IMM GRANULOCYTES # BLD: 0 10*3/MM3 (ref 0–0.03)
IMM GRANULOCYTES NFR BLD: 0 % (ref 0–0.5)
LYMPHOCYTES # BLD AUTO: 1.99 10*3/MM3 (ref 0.9–4.8)
LYMPHOCYTES NFR BLD AUTO: 32.5 % (ref 19.6–45.3)
MCH RBC QN AUTO: 32.2 PG (ref 26.9–32)
MCHC RBC AUTO-ENTMCNC: 33.2 G/DL (ref 32.4–36.3)
MCV RBC AUTO: 97.1 FL (ref 80.5–98.2)
MONOCYTES # BLD AUTO: 0.68 10*3/MM3 (ref 0.2–1.2)
MONOCYTES NFR BLD AUTO: 11.1 % (ref 5–12)
NEUTROPHILS # BLD AUTO: 3.24 10*3/MM3 (ref 1.9–8.1)
NEUTROPHILS NFR BLD AUTO: 52.8 % (ref 42.7–76)
PLATELET # BLD AUTO: 156 10*3/MM3 (ref 140–500)
PMV BLD AUTO: 9.6 FL (ref 6–12)
POTASSIUM BLD-SCNC: 4.1 MMOL/L (ref 3.5–5.2)
RBC # BLD AUTO: 4.1 10*6/MM3 (ref 3.9–5.2)
SODIUM BLD-SCNC: 139 MMOL/L (ref 136–145)
TROPONIN T SERPL-MCNC: <0.01 NG/ML (ref 0–0.03)
WBC NRBC COR # BLD: 6.13 10*3/MM3 (ref 4.5–10.7)

## 2017-02-05 PROCEDURE — 80048 BASIC METABOLIC PNL TOTAL CA: CPT | Performed by: EMERGENCY MEDICINE

## 2017-02-05 PROCEDURE — 93010 ELECTROCARDIOGRAM REPORT: CPT | Performed by: INTERNAL MEDICINE

## 2017-02-05 PROCEDURE — 84484 ASSAY OF TROPONIN QUANT: CPT | Performed by: EMERGENCY MEDICINE

## 2017-02-05 PROCEDURE — 99284 EMERGENCY DEPT VISIT MOD MDM: CPT

## 2017-02-05 PROCEDURE — 96361 HYDRATE IV INFUSION ADD-ON: CPT

## 2017-02-05 PROCEDURE — 70450 CT HEAD/BRAIN W/O DYE: CPT

## 2017-02-05 PROCEDURE — 96360 HYDRATION IV INFUSION INIT: CPT

## 2017-02-05 PROCEDURE — 93005 ELECTROCARDIOGRAM TRACING: CPT | Performed by: EMERGENCY MEDICINE

## 2017-02-05 PROCEDURE — 85025 COMPLETE CBC W/AUTO DIFF WBC: CPT | Performed by: EMERGENCY MEDICINE

## 2017-02-05 RX ORDER — MECLIZINE HYDROCHLORIDE 25 MG/1
25 TABLET ORAL 3 TIMES DAILY PRN
Qty: 30 TABLET | Refills: 0 | Status: SHIPPED | OUTPATIENT
Start: 2017-02-05 | End: 2020-06-18

## 2017-02-05 RX ADMIN — SODIUM CHLORIDE 500 ML: 9 INJECTION, SOLUTION INTRAVENOUS at 12:07

## 2017-02-05 NOTE — ED PROVIDER NOTES
" EMERGENCY DEPARTMENT ENCOUNTER    CHIEF COMPLAINT  Chief Complaint: Dizziness  History given by: Patient  History limited by: Memory  Room Number: 24/24  PMD: Louis Valdez MD      HPI:  Pt is a 85 y.o. female who presents complaining of episodic dizziness which began yesterday morning when she attempted to get out of bed. After the patient developed the dizziness, she laid back down which relieved the sensation. The patient's spouse reports that she developed the dizziness again this morning around 02:00AM and he assisted her to the bathroom because she could not ambulate on her own. She denies near syncope while she was experiencing the dizziness this morning. Her blood pressure was self reported at 130/60 09:00 this morning, but she was not dizzy at that time. Pt also denies nausea, vomiting, pedal edema, focal weakness or tinnitus since onset. No other complaints at this time.      Duration:  2 days  Onset: Sudden  Timing: Episodic  Location: Generalized  Quality: \"Dizzy\"  Intensity/Severity: Moderate  Progression: Resolved  Associated Symptoms: Dizziness  Aggravating Factors: Movement, standing   Alleviating Factors: Rest  Previous Episodes: Yes, several over the past two days  Treatment before arrival: None mentioned    PAST MEDICAL HISTORY  Active Ambulatory Problems     Diagnosis Date Noted   • Atypical chest pain 03/23/2016   • Benign essential HTN 03/23/2016   • Bronchitis 03/23/2016   • Atherosclerosis of coronary artery 03/23/2016   • Carpal tunnel syndrome 03/23/2016   • Fatigue 03/23/2016   • Arthralgia of hip 03/23/2016   • HLD (hyperlipidemia) 03/23/2016   • Amyotrophia 03/23/2016   • Adult hypothyroidism 03/23/2016   • Amnesia 03/23/2016   • Pharyngeal inflammation 03/23/2016   • Basal cell papilloma 03/23/2016   • Forgetfulness 03/23/2016   • Chronic kidney disease, stage III (moderate) 06/24/2016   • Night sweats 08/22/2016   • Hot flashes 08/22/2016   • Urine frequency 08/22/2016   • " Gastroesophageal reflux disease with esophagitis 10/13/2016   • Dyslipidemia 10/13/2016   • Chronic gout 10/13/2016     Resolved Ambulatory Problems     Diagnosis Date Noted   • BP (high blood pressure) 03/23/2016     Past Medical History   Diagnosis Date   • Chronic kidney disease    • Coronary atherosclerosis    • Hypercholesteremia    • Hypertension        PAST SURGICAL HISTORY  Past Surgical History   Procedure Laterality Date   • Partial hysterectomy         FAMILY HISTORY  History reviewed. No pertinent family history.    SOCIAL HISTORY  Social History     Social History   • Marital status:      Spouse name: N/A   • Number of children: N/A   • Years of education: N/A     Occupational History   • Not on file.     Social History Main Topics   • Smoking status: Never Smoker   • Smokeless tobacco: Not on file   • Alcohol use No   • Drug use: Not on file   • Sexual activity: Not on file     Other Topics Concern   • Not on file     Social History Narrative       ALLERGIES  Statins and Sulfa antibiotics    REVIEW OF SYSTEMS  Review of Systems   Constitutional: Negative for chills and fatigue.   HENT: Negative for congestion, rhinorrhea and sore throat.    Eyes: Negative for pain.   Respiratory: Negative for cough, shortness of breath and wheezing.    Cardiovascular: Negative for chest pain, palpitations and leg swelling.   Gastrointestinal: Negative for abdominal pain, diarrhea, nausea and vomiting.   Genitourinary: Negative for difficulty urinating, dysuria, flank pain and frequency.   Musculoskeletal: Negative for arthralgias, myalgias, neck pain and neck stiffness.   Skin: Negative for rash.   Neurological: Positive for dizziness. Negative for speech difficulty, weakness, light-headedness, numbness and headaches.   Psychiatric/Behavioral: Negative.    All other systems reviewed and are negative.      PHYSICAL EXAM  ED Triage Vitals   Temp Heart Rate Resp BP SpO2   02/05/17 1042 02/05/17 1042 02/05/17 1042  -- 02/05/17 1042   96.8 °F (36 °C) 70 16  99 %      Temp src Heart Rate Source Patient Position BP Location FiO2 (%)   02/05/17 1042 02/05/17 1042 -- -- --   Tympanic Monitor          Physical Exam   Constitutional: She is oriented to person, place, and time and well-developed, well-nourished, and in no distress. No distress.   Baseline confusion   HENT:   Head: Normocephalic.   Eyes: EOM are normal. Pupils are equal, round, and reactive to light. Right eye exhibits no nystagmus. Left eye exhibits no nystagmus.   Neck: Normal range of motion.   Cardiovascular: Normal rate and regular rhythm.    No murmur heard.  Pulmonary/Chest: Effort normal and breath sounds normal. No respiratory distress.   Abdominal: Soft. There is no tenderness. There is no rebound and no guarding.   Musculoskeletal: Normal range of motion. She exhibits no edema.   Neurological: She is alert and oriented to person, place, and time.   Normal. No ataxia.    Skin: Skin is warm and dry. No rash noted.   Psychiatric: Mood and affect normal.   Nursing note and vitals reviewed.      LAB RESULTS  Lab Results (last 24 hours)     Procedure Component Value Units Date/Time    CBC & Differential [[id number]] Collected:  02/05/17 1112    Specimen:  Blood Updated:  02/05/17 1119    Narrative:       The following orders were created for panel order CBC & Differential.  Procedure                               Abnormality         Status                     ---------                               -----------         ------                     CBC Auto Differential[[id number]]         Abnormal            Final result                 Please view results for these tests on the individual orders.    Basic Metabolic Panel [[id number]]  (Abnormal) Collected:  02/05/17 1112    Specimen:  Blood Updated:  02/05/17 1150     Glucose 120 (H) mg/dL      BUN 27 (H) mg/dL      Creatinine 1.43 (H) mg/dL      Sodium 139 mmol/L      Potassium 4.1 mmol/L      Chloride 101 mmol/L       CO2 23.8 mmol/L      Calcium 9.2 mg/dL      eGFR Non African Amer 35 (L) mL/min/1.73      BUN/Creatinine Ratio 18.9      Anion Gap 14.2 mmol/L     Narrative:       The MDRD GFR formula is only valid for adults with stable renal function between ages 18 and 70.    Troponin [[id number]]  (Normal) Collected:  02/05/17 1112    Specimen:  Blood Updated:  02/05/17 1147     Troponin T <0.010 ng/mL     Narrative:       Troponin T Reference Ranges:  Less than 0.03 ng/mL:    Negative for AMI  0.03 to 0.09 ng/mL:      Indeterminant for AMI  Greater than 0.09 ng/mL: Positive for AMI    CBC Auto Differential [[id number]]  (Abnormal) Collected:  02/05/17 1112    Specimen:  Blood Updated:  02/05/17 1119     WBC 6.13 10*3/mm3      RBC 4.10 10*6/mm3      Hemoglobin 13.2 g/dL      Hematocrit 39.8 %      MCV 97.1 fL      MCH 32.2 (H) pg      MCHC 33.2 g/dL      RDW 13.5 (H) %      RDW-SD 47.7 fl      MPV 9.6 fL      Platelets 156 10*3/mm3      Neutrophil % 52.8 %      Lymphocyte % 32.5 %      Monocyte % 11.1 %      Eosinophil % 2.9 %      Basophil % 0.7 %      Immature Grans % 0.0 %      Neutrophils, Absolute 3.24 10*3/mm3      Lymphocytes, Absolute 1.99 10*3/mm3      Monocytes, Absolute 0.68 10*3/mm3      Eosinophils, Absolute 0.18 10*3/mm3      Basophils, Absolute 0.04 10*3/mm3      Immature Grans, Absolute 0.00 10*3/mm3           I ordered the above labs and reviewed the results    RADIOLOGY  CT Head Without Contrast   Final Result   1. No evidence of acute intracranial process.       This report was finalized on 2/5/2017 11:27 AM by Dr. Renan Mendez MD.             I ordered the above noted radiological studies. Interpreted by radiologist. Discussed with radiologist (). Reviewed by me in PACS.       PROCEDURES  Procedures    EKG           EKG time: 11:26  Rhythm/Rate: NSR 54  P waves and DC: Normal  QRS, axis: Normal   ST and T waves: Normal     Interpreted Contemporaneously by me, independently viewed  No significant  change compared to prior 1/16/17      PROGRESS AND CONSULTS  ED Course     11:10  Ordered Labs, EKG and CT Head for further evaluation    13:31  Rechecked patient and they are resting comfortably. Discussed the patient's pertinent labs and imaging results, including CT head, EKG and labs were all negative acute. Discussed plan to discharge the patient home with Antivert and Coreg and recommended follow up with  if she is still experiencing the dizziness. Patient agrees with the plan and all questions were addressed.     Latest vital signs   BP- 136/61 HR- 61 Temp- 96.8 °F (36 °C) (Tympanic) O2 sat- 94%      MEDICAL DECISION MAKING  Results were reviewed/discussed with the patient and they were also made aware of online access. Pt also made aware that some labs, such as cultures, will not be resulted during ER visit and follow up with PMD is necessary.     MDM  Number of Diagnoses or Management Options  Dizziness:      Amount and/or Complexity of Data Reviewed  Clinical lab tests: ordered and reviewed  Tests in the radiology section of CPT®: ordered and reviewed (CT head - NAD)  Tests in the medicine section of CPT®: ordered and reviewed (See EKG)  Discussion of test results with the performing providers: yes  Decide to obtain previous medical records or to obtain history from someone other than the patient: yes  Review and summarize past medical records: yes (Pt had EKG Cardiolite May 2016 which showed nothing acute. Saw  1/20/17 after being evaluated for syncope. )    Patient Progress  Patient progress: stable         DIAGNOSIS  Final diagnoses:   Dizziness       DISPOSITION  DISCHARGE    Patient discharged in stable condition.    Reviewed implications of results, diagnosis, meds, responsibility to follow up, warning signs and symptoms of possible worsening, potential complications and reasons to return to ER, including worsening dizziness, nausea or vomiting.    Patient/Family voiced understanding  of above instructions.    Discussed plan for discharge, as there is no emergent indication for admission.  Pt/family is agreeable and understands need for follow up and repeat testing.  Pt is aware that discharge does not mean that nothing is wrong but it indicates no emergency is present that requires admission and they must continue care with follow-up as given below or physician of their choice.     FOLLOW-UP  Louis Valdez MD  [id number] [id number] [phone]    Schedule an appointment as soon as possible for a visit           Medication List      New Prescriptions          meclizine 25 MG tablet   Commonly known as:  ANTIVERT   Take 1 tablet by mouth 3 (Three) Times a Day As Needed for dizziness.         Changed          carvedilol 25 MG tablet   Commonly known as:  COREG   TAKE ONE TABLET BY MOUTH TWICE A DAY   What changed:  Another medication with the same name was removed. Continue   taking this medication, and follow the directions you see here.         Stop          chlorthalidone 25 MG tablet   Commonly known as:  HYGROTON       nitrofurantoin (macrocrystal-monohydrate) 100 MG capsule   Commonly known as:  MACROBID       Potassium Chloride 40 MEQ/15ML (20%) solution           Latest Documented Vital Signs:  As of 1:32 PM  BP- 136/61 HR- 61 Temp- 96.8 °F (36 °C) (Tympanic) O2 sat- 94%    --  Documentation assistance provided by chris Dai for .  Information recorded by the scribe was done at my direction and has been verified and validated by me.          Lee Dai  02/05/17 [id number]       Guzman Rosas MD  02/05/17 0019

## 2017-02-06 ENCOUNTER — DOCUMENTATION (OUTPATIENT)
Dept: FAMILY MEDICINE CLINIC | Facility: CLINIC | Age: 82
End: 2017-02-06

## 2017-02-06 RX ORDER — NIFEDIPINE 30 MG/1
30 TABLET, EXTENDED RELEASE ORAL 2 TIMES DAILY
Qty: 60 TABLET | Refills: 3 | Status: SHIPPED | OUTPATIENT
Start: 2017-02-06 | End: 2017-04-04 | Stop reason: SDUPTHER

## 2017-02-06 NOTE — PROGRESS NOTES
Cassie Wharton calling about Christine Dasilvad    2/5/2017 8:19 AM    Patient's  called informing that  his wife Christine was having extreme vertigo and he was advised to take her to the emergency room.

## 2017-02-08 ENCOUNTER — TELEPHONE (OUTPATIENT)
Dept: SOCIAL WORK | Facility: HOSPITAL | Age: 82
End: 2017-02-08

## 2017-02-08 NOTE — TELEPHONE ENCOUNTER
ER F/U phone call:  Spoke with pt and spouse, pt is doing well, no further dizzy spells. Explained to spouse what TID/PRN means on medication. To see PCP on 2-10-17. No other questions or concerns voiced at this time. Samaria Ramirez RN

## 2017-02-10 ENCOUNTER — OFFICE VISIT (OUTPATIENT)
Dept: FAMILY MEDICINE CLINIC | Facility: CLINIC | Age: 82
End: 2017-02-10

## 2017-02-10 VITALS
BODY MASS INDEX: 21.9 KG/M2 | HEART RATE: 62 BPM | WEIGHT: 116 LBS | OXYGEN SATURATION: 99 % | TEMPERATURE: 97.8 F | SYSTOLIC BLOOD PRESSURE: 128 MMHG | HEIGHT: 61 IN | RESPIRATION RATE: 14 BRPM | DIASTOLIC BLOOD PRESSURE: 66 MMHG

## 2017-02-10 DIAGNOSIS — J40 BRONCHITIS: ICD-10-CM

## 2017-02-10 DIAGNOSIS — K21.00 GASTROESOPHAGEAL REFLUX DISEASE WITH ESOPHAGITIS: ICD-10-CM

## 2017-02-10 DIAGNOSIS — M10.00 IDIOPATHIC GOUT, UNSPECIFIED CHRONICITY, UNSPECIFIED SITE: ICD-10-CM

## 2017-02-10 DIAGNOSIS — R79.89 LOW VITAMIN D LEVEL: ICD-10-CM

## 2017-02-10 DIAGNOSIS — I10 BENIGN ESSENTIAL HTN: Primary | ICD-10-CM

## 2017-02-10 LAB
25(OH)D3 SERPL-MCNC: 46.2 NG/ML (ref 30–100)
ERYTHROCYTE [DISTWIDTH] IN BLOOD BY AUTOMATED COUNT: 14.8 % (ref 4.5–15)
HCT VFR BLD AUTO: 37.4 % (ref 31–42)
HGB BLD-MCNC: 11.9 G/DL (ref 12–18)
LYMPHOCYTES # BLD AUTO: 1.8 10*3/MM3 (ref 1.2–3.4)
LYMPHOCYTES NFR BLD AUTO: 25.9 % (ref 21–51)
MCH RBC QN AUTO: 30.9 PG (ref 26.1–33.1)
MCHC RBC AUTO-ENTMCNC: 32 G/DL (ref 33–37)
MCV RBC AUTO: 96.6 FL (ref 80–99)
MONOCYTES # BLD AUTO: 0.5 10*3/MM3 (ref 0.1–0.6)
MONOCYTES NFR BLD AUTO: 7.5 % (ref 2–9)
NEUTROPHILS # BLD AUTO: 4.7 10*3/MM3 (ref 1.4–6.5)
NEUTROPHILS NFR BLD AUTO: 66.6 % (ref 42–75)
PLATELET # BLD AUTO: 157 10*3/MM3 (ref 150–450)
PMV BLD AUTO: 6.7 FL (ref 7.1–10.5)
RBC # BLD AUTO: 3.87 10*6/MM3 (ref 4–6)
T-UPTAKE NFR SERPL: 1.03 TBI (ref 0.8–1.3)
T4 SERPL-MCNC: 7.06 MCG/DL (ref 4.5–11.7)
TSH SERPL DL<=0.05 MIU/L-ACNC: 4.13 MIU/ML (ref 0.27–4.2)
URATE SERPL-MCNC: 5.3 MG/DL (ref 2.4–5.7)
WBC NRBC COR # BLD: 7 10*3/MM3 (ref 4.5–10)

## 2017-02-10 PROCEDURE — 82306 VITAMIN D 25 HYDROXY: CPT | Performed by: INTERNAL MEDICINE

## 2017-02-10 PROCEDURE — 84443 ASSAY THYROID STIM HORMONE: CPT | Performed by: INTERNAL MEDICINE

## 2017-02-10 PROCEDURE — 84479 ASSAY OF THYROID (T3 OR T4): CPT | Performed by: INTERNAL MEDICINE

## 2017-02-10 PROCEDURE — 85025 COMPLETE CBC W/AUTO DIFF WBC: CPT | Performed by: INTERNAL MEDICINE

## 2017-02-10 PROCEDURE — 84436 ASSAY OF TOTAL THYROXINE: CPT | Performed by: INTERNAL MEDICINE

## 2017-02-10 PROCEDURE — 99214 OFFICE O/P EST MOD 30 MIN: CPT | Performed by: INTERNAL MEDICINE

## 2017-02-10 PROCEDURE — 84550 ASSAY OF BLOOD/URIC ACID: CPT | Performed by: INTERNAL MEDICINE

## 2017-02-10 RX ORDER — ALLOPURINOL 100 MG/1
100 TABLET ORAL DAILY
Qty: 90 TABLET | Refills: 1 | Status: SHIPPED | OUTPATIENT
Start: 2017-02-10 | End: 2018-02-25 | Stop reason: SDUPTHER

## 2017-02-10 RX ORDER — ALLOPURINOL 100 MG/1
100 TABLET ORAL DAILY
Qty: 90 TABLET | Refills: 1 | Status: SHIPPED | OUTPATIENT
Start: 2017-02-10 | End: 2017-02-10 | Stop reason: SDUPTHER

## 2017-02-10 RX ORDER — LEVOTHYROXINE SODIUM 125 MCG
TABLET ORAL
Qty: 45 TABLET | Refills: 1 | Status: SHIPPED | OUTPATIENT
Start: 2017-02-10 | End: 2017-09-03 | Stop reason: SDUPTHER

## 2017-02-10 NOTE — PROGRESS NOTES
Subjective   Christine Wharton is a 85 y.o. female.     History of Present Illness   Patient being seen today for her blood pressure.  She has been running 120s over 80s.  She also had labs done to evaluate her uric acid and she's had no major reflux over last several months.  She needs refill on her Synthroid and allopurinol but she will wait for levels to be obtained until getting them refilled.  She will continue to monitor blood pressure home return to our clinic in 3 months.    Much of this encounter note is an electronic transcription/translation of spoken language to printed text.  The electronic translation of spoken language may permit erroneous, or at times, nonsensical words or phrases to be inadvertently transcribed.  Although I have reviewed the note for such errors, some may still exist.  The following portions of the patient's history were reviewed and updated as appropriate: allergies, current medications, past family history, past medical history, past social history, past surgical history and problem list.    Review of Systems   Constitutional: Negative for fatigue and fever.   HENT: Positive for congestion. Negative for trouble swallowing.    Eyes: Negative for discharge and visual disturbance.   Respiratory: Negative for choking and shortness of breath.    Cardiovascular: Negative for chest pain and palpitations.   Gastrointestinal: Negative for abdominal pain and blood in stool.   Endocrine: Negative.    Genitourinary: Negative for genital sores and hematuria.   Musculoskeletal: Negative for gait problem and joint swelling.   Skin: Negative for color change, pallor, rash and wound.   Allergic/Immunologic: Positive for environmental allergies. Negative for immunocompromised state.   Neurological: Negative for facial asymmetry and speech difficulty.   Psychiatric/Behavioral: Negative for hallucinations and suicidal ideas.       Objective   Physical Exam   Constitutional: She is oriented to person,  place, and time. She appears well-developed and well-nourished.   HENT:   Head: Normocephalic.   Eyes: Conjunctivae are normal. Pupils are equal, round, and reactive to light.   Neck: Normal range of motion. Neck supple.   Cardiovascular: Normal rate, regular rhythm and normal heart sounds.    Pulmonary/Chest: Effort normal and breath sounds normal.   Abdominal: Soft. Bowel sounds are normal.   Musculoskeletal: Normal range of motion.   Neurological: She is alert and oriented to person, place, and time.   Skin: Skin is warm and dry.   Psychiatric: She has a normal mood and affect. Her behavior is normal. Judgment and thought content normal.   Nursing note and vitals reviewed.      Assessment/Plan   Problems Addressed this Visit        Cardiovascular and Mediastinum    Benign essential HTN - Primary    Relevant Orders    CBC & Differential (Completed)    Comprehensive Metabolic Panel    Lipid Panel    Vitamin D 25 Hydroxy    Thyroid Panel With TSH    Uric Acid    CBC Auto Differential (Completed)       Respiratory    Bronchitis    Relevant Orders    CBC & Differential (Completed)    Comprehensive Metabolic Panel    Lipid Panel    Vitamin D 25 Hydroxy    Thyroid Panel With TSH    Uric Acid    CBC Auto Differential (Completed)       Digestive    Gastroesophageal reflux disease with esophagitis    Relevant Orders    CBC & Differential (Completed)    Comprehensive Metabolic Panel    Lipid Panel    Vitamin D 25 Hydroxy    Thyroid Panel With TSH    Uric Acid    CBC Auto Differential (Completed)      Other Visit Diagnoses     Idiopathic gout, unspecified chronicity, unspecified site        Relevant Orders    CBC & Differential (Completed)    Comprehensive Metabolic Panel    Lipid Panel    Vitamin D 25 Hydroxy    Thyroid Panel With TSH    Uric Acid    CBC Auto Differential (Completed)    Low vitamin D level        Relevant Orders    CBC & Differential (Completed)    Comprehensive Metabolic Panel    Lipid Panel    Vitamin D  25 Hydroxy    Thyroid Panel With TSH    Uric Acid    CBC Auto Differential (Completed)

## 2017-02-14 ENCOUNTER — TELEPHONE (OUTPATIENT)
Dept: FAMILY MEDICINE CLINIC | Facility: CLINIC | Age: 82
End: 2017-02-14

## 2017-02-21 DIAGNOSIS — E78.5 HYPERLIPEMIA: ICD-10-CM

## 2017-02-21 LAB
ALBUMIN SERPL-MCNC: 3.9 G/DL (ref 3.5–5.2)
ALBUMIN/GLOB SERPL: 1.5 G/DL
ALP SERPL-CCNC: 69 U/L (ref 39–117)
ALT SERPL W P-5'-P-CCNC: 14 U/L (ref 1–33)
ANION GAP SERPL CALCULATED.3IONS-SCNC: 12.3 MMOL/L
AST SERPL-CCNC: 17 U/L (ref 1–32)
BILIRUB SERPL-MCNC: 0.4 MG/DL (ref 0.1–1.2)
BUN BLD-MCNC: 25 MG/DL (ref 8–23)
BUN/CREAT SERPL: 16.6 (ref 7–25)
CALCIUM SPEC-SCNC: 9.1 MG/DL (ref 8.6–10.5)
CHLORIDE SERPL-SCNC: 104 MMOL/L (ref 98–107)
CHOLEST SERPL-MCNC: 147 MG/DL (ref 0–200)
CO2 SERPL-SCNC: 25.7 MMOL/L (ref 22–29)
CREAT BLD-MCNC: 1.51 MG/DL (ref 0.57–1)
GFR SERPL CREATININE-BSD FRML MDRD: 33 ML/MIN/1.73
GLOBULIN UR ELPH-MCNC: 2.6 GM/DL
GLUCOSE BLD-MCNC: 104 MG/DL (ref 65–99)
HDLC SERPL-MCNC: 64 MG/DL (ref 40–60)
LDLC SERPL CALC-MCNC: 72 MG/DL (ref 0–100)
LDLC/HDLC SERPL: 1.12 {RATIO}
POTASSIUM BLD-SCNC: 4.5 MMOL/L (ref 3.5–5.2)
PROT SERPL-MCNC: 6.5 G/DL (ref 6–8.5)
SODIUM BLD-SCNC: 142 MMOL/L (ref 136–145)
TRIGL SERPL-MCNC: 56 MG/DL (ref 0–150)
VLDLC SERPL-MCNC: 11.2 MG/DL (ref 5–40)

## 2017-02-21 PROCEDURE — 80061 LIPID PANEL: CPT | Performed by: INTERNAL MEDICINE

## 2017-02-21 PROCEDURE — 80053 COMPREHEN METABOLIC PANEL: CPT | Performed by: INTERNAL MEDICINE

## 2017-02-22 RX ORDER — ROSUVASTATIN CALCIUM 5 MG/1
TABLET, COATED ORAL
Qty: 15 TABLET | Refills: 0 | Status: SHIPPED | OUTPATIENT
Start: 2017-02-22 | End: 2017-04-04 | Stop reason: SDUPTHER

## 2017-04-03 DIAGNOSIS — E78.5 HYPERLIPEMIA: ICD-10-CM

## 2017-04-03 RX ORDER — ROSUVASTATIN CALCIUM 5 MG/1
TABLET, COATED ORAL
Qty: 15 TABLET | Refills: 0 | Status: CANCELLED | OUTPATIENT
Start: 2017-04-03

## 2017-04-04 RX ORDER — ROSUVASTATIN CALCIUM 5 MG/1
5 TABLET, COATED ORAL DAILY
Qty: 15 TABLET | Refills: 3 | Status: SHIPPED | OUTPATIENT
Start: 2017-04-04 | End: 2017-07-13 | Stop reason: SDUPTHER

## 2017-04-04 RX ORDER — NIFEDIPINE 30 MG/1
30 TABLET, EXTENDED RELEASE ORAL 2 TIMES DAILY
Qty: 60 TABLET | Refills: 3 | Status: SHIPPED | OUTPATIENT
Start: 2017-04-04 | End: 2019-10-11

## 2017-06-09 ENCOUNTER — OFFICE VISIT (OUTPATIENT)
Dept: FAMILY MEDICINE CLINIC | Facility: CLINIC | Age: 82
End: 2017-06-09

## 2017-06-09 VITALS
BODY MASS INDEX: 21.71 KG/M2 | HEART RATE: 55 BPM | WEIGHT: 115 LBS | TEMPERATURE: 97.8 F | OXYGEN SATURATION: 98 % | DIASTOLIC BLOOD PRESSURE: 60 MMHG | SYSTOLIC BLOOD PRESSURE: 122 MMHG | RESPIRATION RATE: 12 BRPM | HEIGHT: 61 IN

## 2017-06-09 DIAGNOSIS — J06.9 ACUTE URI: ICD-10-CM

## 2017-06-09 DIAGNOSIS — E78.2 MIXED HYPERLIPIDEMIA: ICD-10-CM

## 2017-06-09 DIAGNOSIS — M1A.00X0 IDIOPATHIC CHRONIC GOUT WITHOUT TOPHUS, UNSPECIFIED SITE: ICD-10-CM

## 2017-06-09 DIAGNOSIS — I10 BENIGN ESSENTIAL HTN: ICD-10-CM

## 2017-06-09 DIAGNOSIS — Z00.00 MEDICARE ANNUAL WELLNESS VISIT, INITIAL: Primary | ICD-10-CM

## 2017-06-09 DIAGNOSIS — E03.9 ADULT HYPOTHYROIDISM: ICD-10-CM

## 2017-06-09 DIAGNOSIS — K21.00 GASTROESOPHAGEAL REFLUX DISEASE WITH ESOPHAGITIS: ICD-10-CM

## 2017-06-09 LAB
CHOLEST SERPL-MCNC: 155 MG/DL (ref 0–200)
HDLC SERPL-MCNC: 65 MG/DL (ref 40–60)
LDLC SERPL CALC-MCNC: 77 MG/DL (ref 0–100)
LDLC/HDLC SERPL: 1.18 {RATIO}
TRIGL SERPL-MCNC: 65 MG/DL (ref 0–150)
URATE SERPL-MCNC: 6.2 MG/DL (ref 2.4–5.7)
VLDLC SERPL-MCNC: 13 MG/DL (ref 5–40)

## 2017-06-09 PROCEDURE — 84479 ASSAY OF THYROID (T3 OR T4): CPT | Performed by: INTERNAL MEDICINE

## 2017-06-09 PROCEDURE — 84550 ASSAY OF BLOOD/URIC ACID: CPT | Performed by: INTERNAL MEDICINE

## 2017-06-09 PROCEDURE — 84436 ASSAY OF TOTAL THYROXINE: CPT | Performed by: INTERNAL MEDICINE

## 2017-06-09 PROCEDURE — 80061 LIPID PANEL: CPT | Performed by: INTERNAL MEDICINE

## 2017-06-09 PROCEDURE — 84443 ASSAY THYROID STIM HORMONE: CPT | Performed by: INTERNAL MEDICINE

## 2017-06-09 PROCEDURE — G0438 PPPS, INITIAL VISIT: HCPCS | Performed by: INTERNAL MEDICINE

## 2017-06-09 PROCEDURE — 99214 OFFICE O/P EST MOD 30 MIN: CPT | Performed by: INTERNAL MEDICINE

## 2017-06-09 RX ORDER — DOXYCYCLINE HYCLATE 100 MG
100 TABLET ORAL 2 TIMES DAILY
Qty: 20 TABLET | Refills: 0 | Status: SHIPPED | OUTPATIENT
Start: 2017-06-09 | End: 2018-05-29

## 2017-06-09 NOTE — PROGRESS NOTES
Subjective   Christine Wharton is a 85 y.o. female.     History of Present Illness   Patient is being seen for a Medicare wellness exam.  Her gastroesophageal reflux is been stable with diet her thyroid level is also been stable from medication.  Her lipids also controlled with medication diet and exercise.  Her gout is been well-controlled on medication.  She has developed a URI and received antibiotic.  She is checking her blood pressure home and getting 120s over 80s.  She will follow-up 4 days if not better from her infection.    Much of this encounter note is an electronic transcription/translation of spoken language to printed text.  The electronic translation of spoken language may permit erroneous, or at times, nonsensical words or phrases to be inadvertently transcribed.  Although I have reviewed the note for such errors, some may still exist.  The following portions of the patient's history were reviewed and updated as appropriate: allergies, current medications, past family history, past medical history, past social history, past surgical history and problem list.    Review of Systems   Constitutional: Negative for fatigue and fever.   HENT: Positive for congestion. Negative for trouble swallowing.    Eyes: Negative for discharge and visual disturbance.   Respiratory: Negative for choking and shortness of breath.    Cardiovascular: Negative for chest pain and palpitations.   Gastrointestinal: Negative for abdominal pain and blood in stool.   Endocrine: Negative.    Genitourinary: Negative for genital sores and hematuria.   Musculoskeletal: Negative for gait problem and joint swelling.   Skin: Negative for color change, pallor, rash and wound.   Allergic/Immunologic: Positive for environmental allergies. Negative for immunocompromised state.   Neurological: Negative for facial asymmetry and speech difficulty.   Psychiatric/Behavioral: Negative for hallucinations and suicidal ideas.       Objective   Physical  Exam   Constitutional: She is oriented to person, place, and time. She appears well-developed and well-nourished.   HENT:   Head: Normocephalic.   Eyes: Conjunctivae are normal. Pupils are equal, round, and reactive to light.   Neck: Normal range of motion. Neck supple.   Cardiovascular: Normal rate, regular rhythm and normal heart sounds.    Pulmonary/Chest: Effort normal and breath sounds normal.   Abdominal: Soft. Bowel sounds are normal.   Musculoskeletal: Normal range of motion.   Neurological: She is alert and oriented to person, place, and time.   Skin: Skin is warm and dry.   Psychiatric: She has a normal mood and affect. Her behavior is normal. Judgment and thought content normal.   Nursing note and vitals reviewed.      Assessment/Plan   Problems Addressed this Visit        Cardiovascular and Mediastinum    Benign essential HTN    Relevant Orders    Thyroid Panel With TSH    Lipid Panel    HLD (hyperlipidemia)    Relevant Orders    Thyroid Panel With TSH    Lipid Panel       Digestive    Gastroesophageal reflux disease with esophagitis    Relevant Orders    Thyroid Panel With TSH    Lipid Panel       Endocrine    Adult hypothyroidism    Relevant Orders    Thyroid Panel With TSH    Lipid Panel       Other    Chronic gout    Relevant Orders    Uric Acid      Other Visit Diagnoses     Medicare annual wellness visit, initial    -  Primary    Relevant Orders    Thyroid Panel With TSH    Lipid Panel    Acute URI        Relevant Medications    doxycycline (VIBRAMYICN) 100 MG tablet

## 2017-06-09 NOTE — PROGRESS NOTES
QUICK REFERENCE INFORMATION:  The ABCs of the Annual Wellness Visit    Initial Medicare Wellness Visit    HEALTH RISK ASSESSMENT    1/4/1932    Recent Hospitalizations:  No hospitalization(s) within the last year..        Current Medical Providers:  Patient Care Team:  Louis Valdez MD as PCP - General (Internal Medicine)  Sonu Yip Jr., MD as Consulting Physician (Cardiology)  Louis Lee MD as Consulting Physician (Nephrology)        Smoking Status:  History   Smoking Status   • Never Smoker   Smokeless Tobacco   • Not on file       Alcohol Consumption:  History   Alcohol Use No       Depression Screen:   PHQ-9 Depression Screening 6/9/2017   Little interest or pleasure in doing things 0   Feeling down, depressed, or hopeless 0   Trouble falling or staying asleep, or sleeping too much 0   Feeling tired or having little energy 0   Poor appetite or overeating 0   Feeling bad about yourself - or that you are a failure or have let yourself or your family down 0   Trouble concentrating on things, such as reading the newspaper or watching television 0   Moving or speaking so slowly that other people could have noticed. Or the opposite - being so fidgety or restless that you have been moving around a lot more than usual 0   Thoughts that you would be better off dead, or of hurting yourself in some way 0   PHQ-9 Total Score 0   If you checked off any problems, how difficult have these problems made it for you to do your work, take care of things at home, or get along with other people? Not difficult at all       Health Habits and Functional and Cognitive Screening:  Functional & Cognitive Status 6/9/2017   Do you have difficulty preparing food and eating? No   Do you have difficulty bathing yourself? No   Do you have difficulty getting dressed? No   Do you have difficulty using the toilet? No   Do you have difficulty moving around from place to place? No   In the past year have you fallen or experienced a  near fall? No   Do you need help using the phone?  No   Are you deaf or do you have serious difficulty hearing?  No   Do you need help with transportation? No   Do you need help shopping? No   Do you need help preparing meals?  No   Do you need help with housework?  No   Do you need help with laundry? No   Do you need help taking your medications? No       Health Habits  Current Diet: Well Balanced Diet  Dental Exam: Up to date  Eye Exam: Up to date  Exercise (times per week): 7 times per week  Current Exercise Activities Include: Walking          Does the patient have evidence of cognitive impairment? Yes    Asiprin use counseling: Taking ASA appropriately as indicated      Recent Lab Results:    Visual Acuity:  No exam data present    Age-appropriate Screening Schedule:  Refer to the list below for future screening recommendations based on patient's age, sex and/or medical conditions. Orders for these recommended tests are listed in the plan section. The patient has been provided with a written plan.    Health Maintenance   Topic Date Due   • TDAP/TD VACCINES (1 - Tdap) 01/04/1951   • INFLUENZA VACCINE  08/01/2017   • LIPID PANEL  02/21/2018   • PNEUMOCOCCAL VACCINES (65+ LOW/MEDIUM RISK) (2 of 2 - PPSV23) 06/09/2018   • MAMMOGRAM  06/09/2019   • ZOSTER VACCINE  Addressed        Subjective   History of Present Illness    Christine Wharton is a 85 y.o. female who presents for an Annual Wellness Visit.    The following portions of the patient's history were reviewed and updated as appropriate: allergies, current medications, past family history, past medical history, past social history, past surgical history and problem list.    Outpatient Medications Prior to Visit   Medication Sig Dispense Refill   • allopurinol (ZYLOPRIM) 100 MG tablet Take 1 tablet by mouth Daily. 90 tablet 1   • aspirin 81 MG tablet Take 1 tablet by mouth daily.     • carvedilol (COREG) 25 MG tablet TAKE ONE TABLET BY MOUTH TWICE A  tablet 0  "  • NIFEdipine XL (PROCARDIA XL) 30 MG 24 hr tablet Take 1 tablet by mouth 2 (Two) Times a Day. 60 tablet 3   • rosuvastatin (CRESTOR) 5 MG tablet Take 1 tablet by mouth Daily. 15 tablet 3   • SYNTHROID 125 MCG tablet 1/2 po qday 45 tablet 1   • chlorthalidone (HYGROTEN) 25 MG tablet 12.5 mg Daily.     • cholecalciferol (VITAMIN D3) 1000 UNITS tablet Take 1 tablet by mouth daily.     • meclizine (ANTIVERT) 25 MG tablet Take 1 tablet by mouth 3 (Three) Times a Day As Needed for dizziness. 30 tablet 0   • Potassium Chloride 40 MEQ/15ML (20%) solution Take 15 mL by mouth Daily. 600 mL 3   • carvedilol (COREG) 25 MG tablet Take 25 mg by mouth 3 (Three) Times a Day.       No facility-administered medications prior to visit.        Patient Active Problem List   Diagnosis   • Atypical chest pain   • Benign essential HTN   • Bronchitis   • Atherosclerosis of coronary artery   • Carpal tunnel syndrome   • Fatigue   • Arthralgia of hip   • HLD (hyperlipidemia)   • Amyotrophia   • Adult hypothyroidism   • Amnesia   • Pharyngeal inflammation   • Basal cell papilloma   • Forgetfulness   • Chronic kidney disease, stage III (moderate)   • Night sweats   • Hot flashes   • Urine frequency   • Gastroesophageal reflux disease with esophagitis   • Dyslipidemia   • Chronic gout       Advance Care Planning:  has an advance directive - a copy HAS NOT been provided    Identification of Risk Factors:  Risk factors include: NA.    Review of Systems    Compared to one year ago, the patient feels her physical health is the same.  Compared to one year ago, the patient feels her mental health is the same.    Objective     Physical Exam    Vitals:    06/09/17 1310   BP: 122/60   BP Location: Left arm   Patient Position: Sitting   Cuff Size: Adult   Pulse: 55   Resp: 12   Temp: 97.8 °F (36.6 °C)   TempSrc: Oral   SpO2: 98%   Weight: 115 lb (52.2 kg)   Height: 61\" (154.9 cm)   PainSc: 0-No pain       Body mass index is 21.73 kg/(m^2).  Discussed " the patient's BMI with her. The BMI is in the acceptable range.    Assessment/Plan   Patient Self-Management and Personalized Health Advice  The patient has been provided with information about: NA and preventive services including:   · NA.    Visit Diagnoses:    ICD-10-CM ICD-9-CM   1. Medicare annual wellness visit, initial Z00.00 V70.0   2. Benign essential HTN I10 401.1   3. Mixed hyperlipidemia E78.2 272.2   4. Adult hypothyroidism E03.9 244.9   5. Gastroesophageal reflux disease with esophagitis K21.0 530.11       Orders Placed This Encounter   Procedures   • Thyroid Panel With TSH   • Lipid Panel       Outpatient Encounter Prescriptions as of 6/9/2017   Medication Sig Dispense Refill   • allopurinol (ZYLOPRIM) 100 MG tablet Take 1 tablet by mouth Daily. 90 tablet 1   • aspirin 81 MG tablet Take 1 tablet by mouth daily.     • carvedilol (COREG) 25 MG tablet TAKE ONE TABLET BY MOUTH TWICE A  tablet 0   • NIFEdipine XL (PROCARDIA XL) 30 MG 24 hr tablet Take 1 tablet by mouth 2 (Two) Times a Day. 60 tablet 3   • rosuvastatin (CRESTOR) 5 MG tablet Take 1 tablet by mouth Daily. 15 tablet 3   • SYNTHROID 125 MCG tablet 1/2 po qday 45 tablet 1   • chlorthalidone (HYGROTEN) 25 MG tablet 12.5 mg Daily.     • cholecalciferol (VITAMIN D3) 1000 UNITS tablet Take 1 tablet by mouth daily.     • meclizine (ANTIVERT) 25 MG tablet Take 1 tablet by mouth 3 (Three) Times a Day As Needed for dizziness. 30 tablet 0   • Potassium Chloride 40 MEQ/15ML (20%) solution Take 15 mL by mouth Daily. 600 mL 3   • [DISCONTINUED] carvedilol (COREG) 25 MG tablet Take 25 mg by mouth 3 (Three) Times a Day.       No facility-administered encounter medications on file as of 6/9/2017.        Reviewed use of high risk medication in the elderly: not applicable  Reviewed for potential of harmful drug interactions in the elderly: not applicable    Follow Up:  Return in about 4 months (around 10/9/2017), or if symptoms worsen or fail to improve,  for Recheck.     An After Visit Summary and PPPS with all of these plans were given to the patient.

## 2017-06-13 LAB
T-UPTAKE NFR SERPL: 1.02 TBI (ref 0.8–1.3)
T4 SERPL-MCNC: 8.17 MCG/DL (ref 4.5–11.7)
TSH SERPL DL<=0.05 MIU/L-ACNC: 2.11 MIU/ML (ref 0.27–4.2)

## 2017-07-02 ENCOUNTER — PATIENT MESSAGE (OUTPATIENT)
Dept: CARDIOLOGY | Facility: CLINIC | Age: 82
End: 2017-07-02

## 2017-07-07 ENCOUNTER — OFFICE VISIT (OUTPATIENT)
Dept: CARDIOLOGY | Facility: CLINIC | Age: 82
End: 2017-07-07

## 2017-07-07 VITALS
SYSTOLIC BLOOD PRESSURE: 117 MMHG | BODY MASS INDEX: 22.09 KG/M2 | DIASTOLIC BLOOD PRESSURE: 66 MMHG | WEIGHT: 117 LBS | HEIGHT: 61 IN | HEART RATE: 55 BPM

## 2017-07-07 DIAGNOSIS — I10 BENIGN ESSENTIAL HTN: ICD-10-CM

## 2017-07-07 DIAGNOSIS — I25.10 CAD IN NATIVE ARTERY: Primary | ICD-10-CM

## 2017-07-07 DIAGNOSIS — R53.82 CHRONIC FATIGUE: ICD-10-CM

## 2017-07-07 DIAGNOSIS — N18.30 CHRONIC KIDNEY DISEASE, STAGE III (MODERATE) (HCC): ICD-10-CM

## 2017-07-07 DIAGNOSIS — I10 HTN (HYPERTENSION), BENIGN: ICD-10-CM

## 2017-07-07 DIAGNOSIS — R68.89 FORGETFULNESS: ICD-10-CM

## 2017-07-07 DIAGNOSIS — I25.10 ATHEROSCLEROSIS OF NATIVE CORONARY ARTERY OF NATIVE HEART WITHOUT ANGINA PECTORIS: ICD-10-CM

## 2017-07-07 DIAGNOSIS — E78.2 MIXED HYPERLIPIDEMIA: ICD-10-CM

## 2017-07-07 DIAGNOSIS — K21.00 GASTROESOPHAGEAL REFLUX DISEASE WITH ESOPHAGITIS: ICD-10-CM

## 2017-07-07 PROCEDURE — 99214 OFFICE O/P EST MOD 30 MIN: CPT | Performed by: INTERNAL MEDICINE

## 2017-07-07 PROCEDURE — 93000 ELECTROCARDIOGRAM COMPLETE: CPT | Performed by: INTERNAL MEDICINE

## 2017-07-07 NOTE — PROGRESS NOTES
[facility]  Cardiology Office Visit Note        Subjective:     Encounter Date:2017      Patient ID: Christine Wharton   Age: 85 y.o.  Sex: female  :  1932  MRN: [id number]             Date of Office Visit: 2017  Encounter Provider: Yaquelin Vogel MD  Place of Service: [facility]     .    Chief Complaint:  History of Present Illness    The following portions of the patient's history were reviewed and updated as appropriate: allergies, current medications, past family history, past medical history, past social history, past surgical history and problem list.    Review of Systems   Constitution: Positive for weakness and malaise/fatigue. Negative for chills, fever and weight gain.   HENT: Negative for congestion, headaches, hearing loss and sore throat.    Eyes: Negative for blurred vision and double vision.   Cardiovascular: Negative for chest pain, claudication, cyanosis, dyspnea on exertion, irregular heartbeat, leg swelling, near-syncope, orthopnea, palpitations, paroxysmal nocturnal dyspnea and syncope.   Respiratory: Negative for cough, shortness of breath, snoring and wheezing.    Endocrine: Negative for cold intolerance.   Hematologic/Lymphatic: Negative for adenopathy. Does not bruise/bleed easily.   Skin: Negative for rash.   Musculoskeletal: Negative for back pain.   Gastrointestinal: Negative for abdominal pain, change in bowel habit, constipation, diarrhea and nausea.   Genitourinary: Negative for dysuria, frequency, hematuria and hesitancy.   Neurological: Negative for disturbances in coordination, excessive daytime sleepiness, dizziness, focal weakness, light-headedness, loss of balance and numbness.   Psychiatric/Behavioral: Negative for depression and memory loss. The patient is not nervous/anxious.    Allergic/Immunologic: Negative for hives.         ECG 12 Lead  Date/Time: 2017 2:17 PM  Performed by: YAQUELIN VOGEL JR  P.  Authorized by: YAQUELIN VOGEL JR   Comparison: compared with previous ECG   Similar to previous ECG  Rhythm: sinus bradycardia  BPM: 53  Clinical impression: normal ECG                       HPI     The patient is an 85-year-old white female with history of coronary artery disease, with severe stenosis of the nondominant right coronary artery and severe stenosis of an obtuse marginal by cardiac catheterization, history of hypertension, hyperlipidemia, hypothyroidism, carotid artery disease or presents for cardiac follow-up.  I last IN June 2017.  Prior to that, she states that she had passed out 3 times in the past 5 weeks and was caught by her  each time who saw her while.  She does a very poor memory.  In fact, the  states that only happened once by his recollection.  It sounds like she was unsteady and fell.  At that time, she was down to 102 pounds at one time weighing 125 pounds.  She states she lost her appetite.  I told her to drink more and sure.  I stop the chlorthalidone.  Her blood pressure was as low as 94/52.  Heart rate in the 50s.  She now denies dizziness or syncope.  She [place]  No undue dyspnea on exertion or chest pain.  No heart failure symptoms.  No palpitations.    Cardiac risk factors: Positive for hyperlipidemia, hypertension.  Positive family history of early CAD.  No diabetes.  No smoking.          Past Medical History:   Diagnosis Date   • Chronic kidney disease     Stage 3 (moderate)   • Coronary atherosclerosis     CAD   • Hypercholesteremia    • Hypertension        Past Surgical History:   Procedure Laterality Date   • PARTIAL HYSTERECTOMY         Social History     Social History   • Marital status:      Spouse name: N/A   • Number of children: N/A   • Years of education: N/A     Occupational History   • Not on file.     Social History Main Topics   • Smoking status: Never Smoker   • Smokeless tobacco: Not on file   • Alcohol use  "No   • Drug use: Not on file   • Sexual activity: Not on file     Other Topics Concern   • Not on file     Social History Narrative       No family history on file.        Scheduled Meds:  Current Outpatient Prescriptions on File Prior to Visit   Medication Sig Dispense Refill   • allopurinol (ZYLOPRIM) 100 MG tablet Take 1 tablet by mouth Daily. 90 tablet 1   • aspirin 81 MG tablet Take 1 tablet by mouth daily.     • carvedilol (COREG) 25 MG tablet TAKE ONE TABLET BY MOUTH TWICE A  tablet 0   • chlorthalidone (HYGROTEN) 25 MG tablet 12.5 mg Daily.     • cholecalciferol (VITAMIN D3) 1000 UNITS tablet Take 1 tablet by mouth daily.     • doxycycline (VIBRAMYICN) 100 MG tablet Take 1 tablet by mouth 2 (Two) Times a Day. 20 tablet 0   • meclizine (ANTIVERT) 25 MG tablet Take 1 tablet by mouth 3 (Three) Times a Day As Needed for dizziness. 30 tablet 0   • NIFEdipine XL (PROCARDIA XL) 30 MG 24 hr tablet Take 1 tablet by mouth 2 (Two) Times a Day. 60 tablet 3   • Potassium Chloride 40 MEQ/15ML (20%) solution Take 15 mL by mouth Daily. 600 mL 3   • rosuvastatin (CRESTOR) 5 MG tablet Take 1 tablet by mouth Daily. 15 tablet 3   • SYNTHROID 125 MCG tablet 1/2 po qday 45 tablet 1     No current facility-administered medications on file prior to visit.        /66  Pulse 55  Ht 61\" (154.9 cm)  Wt 117 lb (53.1 kg)  BMI 22.11 kg/m2    Objective:     Physical Exam   Constitutional: She is oriented to person, place, and time. She appears well-developed and well-nourished. No distress.   HENT:   Head: Normocephalic and atraumatic.   Right Ear: External ear normal.   Left Ear: External ear normal.   Mouth/Throat: Oropharynx is clear and moist. No oropharyngeal exudate.   Eyes: Conjunctivae and EOM are normal. Pupils are equal, round, and reactive to light. No scleral icterus.   Neck: Normal range of motion. Neck supple. No JVD present. No tracheal deviation present. No thyromegaly present.   Cardiovascular: Normal " rate, regular rhythm, S1 normal, S2 normal and intact distal pulses.  PMI is not displaced.  Exam reveals no gallop, no distant heart sounds, no friction rub and no decreased pulses.    Murmur heard.  2/6 systolic murmur at the left sternal border.   Pulmonary/Chest: Effort normal and breath sounds normal. No accessory muscle usage. No respiratory distress. She has no wheezes. She has no rales. She exhibits no tenderness.   Abdominal: Soft. Bowel sounds are normal. She exhibits no distension and no mass. There is no tenderness. There is no rebound and no guarding.   Musculoskeletal: Normal range of motion. She exhibits no edema, tenderness or deformity.   Lymphadenopathy:     She has no cervical adenopathy.   Neurological: She is alert and oriented to person, place, and time. She has normal reflexes. No cranial nerve deficit. Coordination normal.   Skin: Skin is dry. No rash noted. She is not diaphoretic. No erythema. No pallor.   Psychiatric: She has a normal mood and affect.             Lab Review:               Lab Review:         Lab Review     Lab Results   Component Value Date    CHOL 155 06/09/2017    CHOL 147 02/21/2017    CHOL 156 10/14/2016     Lab Results   Component Value Date    HDL 65 (H) 06/09/2017    HDL 64 (H) 02/21/2017    HDL 68 (H) 10/14/2016     Lab Results   Component Value Date    LDL 77 08/18/2016     (H) 05/06/2015     Lab Results   Component Value Date    TRIG 65 06/09/2017    TRIG 56 02/21/2017    TRIG 71 10/14/2016     No components found for: CHOLHDL  Lab Results   Component Value Date    GLUCOSE 104 (H) 02/21/2017    BUN 25 (H) 02/21/2017    CREATININE 1.51 (H) 02/21/2017    EGFRIFNONA 33 (L) 02/21/2017    EGFRIFAFRI 41 (L) 08/18/2016    BCR 16.6 02/21/2017    CO2 25.7 02/21/2017    CALCIUM 9.1 02/21/2017    PROTENTOTREF 7.0 08/18/2016    ALBUMIN 3.90 02/21/2017    LABIL2 1.5 02/21/2017    AST 17 02/21/2017    ALT 14 02/21/2017     Lab Results   Component Value Date    GLUCOSE  104 (H) 02/21/2017    CALCIUM 9.1 02/21/2017     02/21/2017    K 4.5 02/21/2017    CO2 25.7 02/21/2017     02/21/2017    BUN 25 (H) 02/21/2017    CREATININE 1.51 (H) 02/21/2017    EGFRIFAFRI 41 (L) 08/18/2016    EGFRIFNONA 33 (L) 02/21/2017    BCR 16.6 02/21/2017    ANIONGAP 12.3 02/21/2017     Lab Results   Component Value Date    WBC 7.00 02/10/2017    HGB 11.9 (L) 02/10/2017    HCT 37.4 02/10/2017    MCV 96.6 02/10/2017     02/10/2017     No results found for: DDIMER  Lab Results   Component Value Date    TSH 2.110 06/09/2017    B0NHCST 8.17 06/09/2017     Lab Results   Component Value Date    CKTOTAL 71 05/19/2016     No results found for: DIGOXIN  Lab Results   Component Value Date    CKTOTAL 71 05/19/2016    TROPONINT <0.010 02/05/2017     No results found for: INR, PROTIME  CrCl cannot be calculated (Patient's most recent sCr result is older than the maximum 30 days allowed.).    Assessment:          Diagnosis Plan   1. CAD in native artery  ECG 12 Lead   2. HTN (hypertension), benign  ECG 12 Lead   3. Atherosclerosis of native coronary artery of native heart without angina pectoris     4. Benign essential HTN     5. Mixed hyperlipidemia     6. Gastroesophageal reflux disease with esophagitis     7. Chronic kidney disease, stage III (moderate)     8. Chronic fatigue     9. Forgetfulness            Assessment and Plan:    Christine was seen today for coronary artery disease and hypertension.    Diagnoses and all orders for this visit:    CAD in native artery  -     ECG 12 Lead    HTN (hypertension), benign  -     ECG 12 Lead    Atherosclerosis of native coronary artery of native heart without angina pectoris    Benign essential HTN    Mixed hyperlipidemia    Gastroesophageal reflux disease with esophagitis    Chronic kidney disease, stage III (moderate)    Chronic fatigue    Forgetfulness    The patient no longer is complaining of dizziness.  She is slightly orthostatic on exam with blood  pressure supine 133/56, pulse 50, sitting 131/58, pulse 53 and standing 111/66 pulse 56.  However, she is not dizzy even when her blood pressure drops at home.  Blood pressure has been running as high as 127/64 and as low as 96/54, all without symptoms.  Heart rate is always in the 50s.  He didn't want to decrease her Coreg as she is doing so well.      Her weight is up to 117 pounds,, which helped stabilize her blood pressure.    I will call in Crestor 5 mg #45 when asked by mouth daily 3 refills.      A total of 25 minutes was spent in the care of this patient, including at least 13 minutes face-to-face with the patient.    I not only counseled the patient today on the significant factors noted in the assessment and plan, but I also recommended that the patient reduce salt and saturated animal fat intake in diet, as well as to perform scheduled exercise on a regular basis.      Plan:                  07/07/2017  8:44 PM  MD Sonu Qiu MD  7/7/2017, 8:44 PM    EMR Dragon/Transcription disclaimer:   Much of this encounter note is an electronic transcription/translation of spoken language to printed text. The electronic translation of spoken language may permit erroneous, or at times, nonsensical words or phrases to be inadvertently transcribed; Although I have reviewed the note for such errors, some may still exist.

## 2017-07-13 RX ORDER — ROSUVASTATIN CALCIUM 5 MG/1
2.5 TABLET, COATED ORAL DAILY
Qty: 15 TABLET | Refills: 11 | Status: SHIPPED | OUTPATIENT
Start: 2017-07-13

## 2017-09-05 RX ORDER — LEVOTHYROXINE SODIUM 125 MCG
TABLET ORAL
Qty: 45 TABLET | Refills: 0 | Status: SHIPPED | OUTPATIENT
Start: 2017-09-05 | End: 2017-11-26 | Stop reason: SDUPTHER

## 2017-11-16 ENCOUNTER — TELEPHONE (OUTPATIENT)
Dept: FAMILY MEDICINE CLINIC | Facility: CLINIC | Age: 82
End: 2017-11-16

## 2017-11-16 DIAGNOSIS — F02.81 ALZHEIMER'S DEMENTIA WITH BEHAVIORAL DISTURBANCE, UNSPECIFIED TIMING OF DEMENTIA ONSET: Primary | ICD-10-CM

## 2017-11-16 DIAGNOSIS — G30.9 ALZHEIMER'S DEMENTIA WITH BEHAVIORAL DISTURBANCE, UNSPECIFIED TIMING OF DEMENTIA ONSET: Primary | ICD-10-CM

## 2017-11-16 NOTE — TELEPHONE ENCOUNTER
PT SON CALLED WORRIED ABOUT HIS MOTHER HAVING ALZHEIMER HE SAID HE FEELS AS THOUGH SHE IS GOING DOWN [place] FAST . HE WOULD LIKE SOME ADVICE ABOUT THIS DISEASE BECAUSE HE DOESN'T KNOW TO MUCH AND HE WANTS TO PREPARE HIS DAD FOR WHATS GOING TO HAPPEN TO HIS MOTHER

## 2017-11-16 NOTE — TELEPHONE ENCOUNTER
You need to put in referral to [facility] as urgent please...... Pt son informed process and is a little familiar but needs set up asap

## 2017-11-16 NOTE — TELEPHONE ENCOUNTER
My suggestions get patient evaluated by [facility] and then you can determine the course of action exhibits also she will change her to get worse and may get very aggressive

## 2017-11-27 RX ORDER — LEVOTHYROXINE SODIUM 125 MCG
TABLET ORAL
Qty: 45 TABLET | Refills: 0 | Status: SHIPPED | OUTPATIENT
Start: 2017-11-27 | End: 2018-03-04 | Stop reason: SDUPTHER

## 2018-02-26 RX ORDER — ALLOPURINOL 100 MG/1
TABLET ORAL
Qty: 90 TABLET | Refills: 0 | Status: SHIPPED | OUTPATIENT
Start: 2018-02-26 | End: 2018-05-13 | Stop reason: SDUPTHER

## 2018-03-05 RX ORDER — LEVOTHYROXINE SODIUM 125 MCG
TABLET ORAL
Qty: 45 TABLET | Refills: 0 | Status: SHIPPED | OUTPATIENT
Start: 2018-03-05 | End: 2018-05-29 | Stop reason: SDUPTHER

## 2018-05-14 RX ORDER — ALLOPURINOL 100 MG/1
TABLET ORAL
Qty: 30 TABLET | Refills: 0 | Status: SHIPPED | OUTPATIENT
Start: 2018-05-14 | End: 2018-06-11 | Stop reason: SDUPTHER

## 2018-05-29 ENCOUNTER — OFFICE VISIT (OUTPATIENT)
Dept: FAMILY MEDICINE CLINIC | Facility: CLINIC | Age: 83
End: 2018-05-29

## 2018-05-29 VITALS
BODY MASS INDEX: 21.14 KG/M2 | SYSTOLIC BLOOD PRESSURE: 130 MMHG | RESPIRATION RATE: 15 BRPM | TEMPERATURE: 97.9 F | HEART RATE: 59 BPM | WEIGHT: 112 LBS | DIASTOLIC BLOOD PRESSURE: 58 MMHG | HEIGHT: 61 IN | OXYGEN SATURATION: 98 %

## 2018-05-29 DIAGNOSIS — M1A.00X0 IDIOPATHIC CHRONIC GOUT WITHOUT TOPHUS, UNSPECIFIED SITE: ICD-10-CM

## 2018-05-29 DIAGNOSIS — I10 BENIGN ESSENTIAL HTN: ICD-10-CM

## 2018-05-29 DIAGNOSIS — E03.9 ADULT HYPOTHYROIDISM: Primary | ICD-10-CM

## 2018-05-29 DIAGNOSIS — E78.2 MIXED HYPERLIPIDEMIA: ICD-10-CM

## 2018-05-29 LAB
ALBUMIN SERPL-MCNC: 4.5 G/DL (ref 3.5–5.2)
ALBUMIN/GLOB SERPL: 1.5 G/DL
ALP SERPL-CCNC: 86 U/L (ref 39–117)
ALT SERPL W P-5'-P-CCNC: 10 U/L (ref 1–33)
ANION GAP SERPL CALCULATED.3IONS-SCNC: 13.7 MMOL/L
AST SERPL-CCNC: 20 U/L (ref 1–32)
BILIRUB SERPL-MCNC: 0.5 MG/DL (ref 0.1–1.2)
BUN BLD-MCNC: 42 MG/DL (ref 8–23)
BUN/CREAT SERPL: 22.7 (ref 7–25)
CALCIUM SPEC-SCNC: 9.6 MG/DL (ref 8.6–10.5)
CHLORIDE SERPL-SCNC: 103 MMOL/L (ref 98–107)
CHOLEST SERPL-MCNC: 148 MG/DL (ref 0–200)
CO2 SERPL-SCNC: 25.3 MMOL/L (ref 22–29)
CREAT BLD-MCNC: 1.85 MG/DL (ref 0.57–1)
ERYTHROCYTE [DISTWIDTH] IN BLOOD BY AUTOMATED COUNT: 14.1 % (ref 4.5–15)
GFR SERPL CREATININE-BSD FRML MDRD: 26 ML/MIN/1.73
GLOBULIN UR ELPH-MCNC: 3 GM/DL
GLUCOSE BLD-MCNC: 106 MG/DL (ref 65–99)
HCT VFR BLD AUTO: 37.9 % (ref 31–42)
HDLC SERPL-MCNC: 65 MG/DL (ref 40–60)
HGB BLD-MCNC: 12.7 G/DL (ref 12–18)
LDLC SERPL CALC-MCNC: 67 MG/DL (ref 0–100)
LDLC/HDLC SERPL: 1.03 {RATIO}
LYMPHOCYTES # BLD AUTO: 2.8 10*3/MM3 (ref 1.2–3.4)
LYMPHOCYTES NFR BLD AUTO: 33.3 % (ref 21–51)
MCH RBC QN AUTO: 31 PG (ref 26.1–33.1)
MCHC RBC AUTO-ENTMCNC: 33.4 G/DL (ref 33–37)
MCV RBC AUTO: 92.8 FL (ref 80–99)
MONOCYTES # BLD AUTO: 0.5 10*3/MM3 (ref 0.1–0.6)
MONOCYTES NFR BLD AUTO: 5.5 % (ref 2–9)
NEUTROPHILS # BLD AUTO: 5.2 10*3/MM3 (ref 1.4–6.5)
NEUTROPHILS NFR BLD AUTO: 61.2 % (ref 42–75)
PLATELET # BLD AUTO: 180 10*3/MM3 (ref 150–450)
PMV BLD AUTO: 7 FL (ref 7.1–10.5)
POTASSIUM BLD-SCNC: 5 MMOL/L (ref 3.5–5.2)
PROT SERPL-MCNC: 7.5 G/DL (ref 6–8.5)
RBC # BLD AUTO: 4.09 10*6/MM3 (ref 4–6)
SODIUM BLD-SCNC: 142 MMOL/L (ref 136–145)
T-UPTAKE NFR SERPL: 0.95 TBI (ref 0.8–1.3)
T4 SERPL-MCNC: 8.66 MCG/DL (ref 4.5–11.7)
TRIGL SERPL-MCNC: 79 MG/DL (ref 0–150)
TSH SERPL DL<=0.05 MIU/L-ACNC: 0.92 MIU/ML (ref 0.27–4.2)
URATE SERPL-MCNC: 5.4 MG/DL (ref 2.4–5.7)
VLDLC SERPL-MCNC: 15.8 MG/DL (ref 5–40)
WBC NRBC COR # BLD: 8.5 10*3/MM3 (ref 4.5–10)

## 2018-05-29 PROCEDURE — 80061 LIPID PANEL: CPT | Performed by: INTERNAL MEDICINE

## 2018-05-29 PROCEDURE — 99213 OFFICE O/P EST LOW 20 MIN: CPT | Performed by: INTERNAL MEDICINE

## 2018-05-29 PROCEDURE — 84479 ASSAY OF THYROID (T3 OR T4): CPT | Performed by: INTERNAL MEDICINE

## 2018-05-29 PROCEDURE — 36415 COLL VENOUS BLD VENIPUNCTURE: CPT | Performed by: INTERNAL MEDICINE

## 2018-05-29 PROCEDURE — 85025 COMPLETE CBC W/AUTO DIFF WBC: CPT | Performed by: INTERNAL MEDICINE

## 2018-05-29 PROCEDURE — 84443 ASSAY THYROID STIM HORMONE: CPT | Performed by: INTERNAL MEDICINE

## 2018-05-29 PROCEDURE — 84436 ASSAY OF TOTAL THYROXINE: CPT | Performed by: INTERNAL MEDICINE

## 2018-05-29 PROCEDURE — 80053 COMPREHEN METABOLIC PANEL: CPT | Performed by: INTERNAL MEDICINE

## 2018-05-29 PROCEDURE — 84550 ASSAY OF BLOOD/URIC ACID: CPT | Performed by: INTERNAL MEDICINE

## 2018-05-29 RX ORDER — LEVOTHYROXINE SODIUM 125 MCG
62.5 TABLET ORAL DAILY
Qty: 45 TABLET | Refills: 3 | Status: SHIPPED | OUTPATIENT
Start: 2018-05-29 | End: 2019-06-02 | Stop reason: SDUPTHER

## 2018-05-29 RX ORDER — DONEPEZIL HYDROCHLORIDE 5 MG/1
5 TABLET, FILM COATED ORAL NIGHTLY
Qty: 30 TABLET | Refills: 3 | Status: SHIPPED | OUTPATIENT
Start: 2018-05-29 | End: 2018-09-30 | Stop reason: SDUPTHER

## 2018-05-29 NOTE — PROGRESS NOTES
Subjective   Christine Wharton is a 86 y.o. female.     History of Present Illness   A she was seen for hypothyroidism.  She did have her levels drawn today and her medicines refilled.  Patient's  says she is very forgetful wanted to try the Aricept.  She will return in 1 month to start Namenda.  Patient did not want to go to a [facility] for evaluation.    Dictated utilizing Dragon dictation. If there are questions or for further clarification, please contact me.   The following portions of the patient's history were reviewed and updated as appropriate: allergies, current medications, past family history, past medical history, past social history, past surgical history and problem list.    Review of Systems   Constitutional: Negative for fatigue and fever.   HENT: Positive for congestion. Negative for trouble swallowing.    Eyes: Negative for discharge and visual disturbance.   Respiratory: Negative for choking and shortness of breath.    Cardiovascular: Negative for chest pain and palpitations.   Gastrointestinal: Negative for abdominal pain and blood in stool.   Endocrine: Negative.    Genitourinary: Negative for genital sores and hematuria.   Musculoskeletal: Negative for gait problem and joint swelling.   Skin: Negative for color change, pallor, rash and wound.   Allergic/Immunologic: Positive for environmental allergies. Negative for immunocompromised state.   Neurological: Negative for facial asymmetry and speech difficulty.   Psychiatric/Behavioral: Positive for confusion and decreased concentration. Negative for hallucinations and suicidal ideas.       Objective   Physical Exam   Constitutional: She is oriented to person, place, and time. She appears well-developed and well-nourished.   HENT:   Head: Normocephalic.   Eyes: Conjunctivae are normal. Pupils are equal, round, and reactive to light.   Neck: Normal range of motion. Neck supple.   Cardiovascular: Normal rate, regular rhythm and normal heart sounds.     Pulmonary/Chest: Effort normal and breath sounds normal.   Abdominal: Soft. Bowel sounds are normal.   Musculoskeletal: Normal range of motion.   Neurological: She is alert and oriented to person, place, and time.   Skin: Skin is warm and dry.   Psychiatric: Her behavior is normal.   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      Assessment/Plan   Problems Addressed this Visit        Cardiovascular and Mediastinum    Benign essential HTN    Relevant Orders    CBC & Differential    Comprehensive Metabolic Panel    Lipid Panel    Thyroid Panel With TSH    Uric Acid    CBC Auto Differential    HLD (hyperlipidemia)    Relevant Orders    CBC & Differential    Comprehensive Metabolic Panel    Lipid Panel    Thyroid Panel With TSH    Uric Acid    CBC Auto Differential       Endocrine    Adult hypothyroidism - Primary    Relevant Medications    SYNTHROID 125 MCG tablet    Other Relevant Orders    CBC & Differential    Comprehensive Metabolic Panel    Lipid Panel    Thyroid Panel With TSH    Uric Acid    CBC Auto Differential       Other    Chronic gout    Relevant Orders    CBC & Differential    Comprehensive Metabolic Panel    Lipid Panel    Thyroid Panel With TSH    Uric Acid    CBC Auto Differential

## 2018-06-11 RX ORDER — ALLOPURINOL 100 MG/1
TABLET ORAL
Qty: 30 TABLET | Refills: 0 | Status: SHIPPED | OUTPATIENT
Start: 2018-06-11 | End: 2018-07-23 | Stop reason: SDUPTHER

## 2018-06-29 ENCOUNTER — OFFICE VISIT (OUTPATIENT)
Dept: FAMILY MEDICINE CLINIC | Facility: CLINIC | Age: 83
End: 2018-06-29

## 2018-06-29 VITALS
WEIGHT: 110 LBS | OXYGEN SATURATION: 99 % | SYSTOLIC BLOOD PRESSURE: 128 MMHG | HEIGHT: 60 IN | DIASTOLIC BLOOD PRESSURE: 88 MMHG | BODY MASS INDEX: 21.6 KG/M2 | TEMPERATURE: 97.7 F | HEART RATE: 54 BPM

## 2018-06-29 DIAGNOSIS — K21.00 GASTROESOPHAGEAL REFLUX DISEASE WITH ESOPHAGITIS: ICD-10-CM

## 2018-06-29 DIAGNOSIS — E78.5 HYPERLIPIDEMIA, UNSPECIFIED HYPERLIPIDEMIA TYPE: ICD-10-CM

## 2018-06-29 DIAGNOSIS — I10 BENIGN ESSENTIAL HTN: Primary | ICD-10-CM

## 2018-06-29 PROCEDURE — 99214 OFFICE O/P EST MOD 30 MIN: CPT | Performed by: INTERNAL MEDICINE

## 2018-06-29 NOTE — PROGRESS NOTES
Subjective   Christine Wharton is a 86 y.o. female.     History of Present Illness   Patient was seen for hypertension.  Pressures running 120s over 80s.  Triglycerides were 79, HDL 65, LDL 67, TSH was 0.93, uric acid was 6.2.  Her lipids a been well-controlled with diet and exercise and medication.  Her GI reflux also controlled with over-the-counter medication.  She will continue to monitor blood pressure return to our clinic in 4 months.    Dictated utilizing Dragon dictation. If there are questions or for further clarification, please contact me.   The following portions of the patient's history were reviewed and updated as appropriate: allergies, current medications, past family history, past medical history, past social history, past surgical history and problem list.    Review of Systems   Constitutional: Negative for fatigue and fever.   HENT: Positive for congestion. Negative for trouble swallowing.    Eyes: Negative for discharge and visual disturbance.   Respiratory: Negative for choking and shortness of breath.    Cardiovascular: Negative for chest pain and palpitations.   Gastrointestinal: Negative for abdominal pain and blood in stool.   Endocrine: Negative.    Genitourinary: Negative for genital sores and hematuria.   Musculoskeletal: Negative for gait problem and joint swelling.   Skin: Negative for color change, pallor, rash and wound.   Allergic/Immunologic: Positive for environmental allergies. Negative for immunocompromised state.   Neurological: Negative for facial asymmetry and speech difficulty.   Psychiatric/Behavioral: Negative for hallucinations and suicidal ideas.       Objective   Physical Exam   Constitutional: She is oriented to person, place, and time. She appears well-developed and well-nourished.   HENT:   Head: Normocephalic.   Eyes: Conjunctivae are normal. Pupils are equal, round, and reactive to light.   Neck: Normal range of motion. Neck supple.   Cardiovascular: Normal rate, regular  rhythm and normal heart sounds.    Pulmonary/Chest: Effort normal and breath sounds normal.   Abdominal: Soft. Bowel sounds are normal.   Musculoskeletal: Normal range of motion.   Neurological: She is alert and oriented to person, place, and time.   Skin: Skin is warm and dry.   Psychiatric: She has a normal mood and affect. Her behavior is normal. Judgment and thought content normal.   Nursing note and vitals reviewed.      Assessment/Plan   Problems Addressed this Visit        Cardiovascular and Mediastinum    Benign essential HTN - Primary    HLD (hyperlipidemia)       Digestive    Gastroesophageal reflux disease with esophagitis

## 2018-07-23 RX ORDER — ALLOPURINOL 100 MG/1
TABLET ORAL
Qty: 30 TABLET | Refills: 0 | Status: SHIPPED | OUTPATIENT
Start: 2018-07-23 | End: 2018-08-20 | Stop reason: SDUPTHER

## 2018-08-20 RX ORDER — ALLOPURINOL 100 MG/1
TABLET ORAL
Qty: 30 TABLET | Refills: 0 | Status: SHIPPED | OUTPATIENT
Start: 2018-08-20 | End: 2018-09-17 | Stop reason: SDUPTHER

## 2018-09-17 RX ORDER — ALLOPURINOL 100 MG/1
TABLET ORAL
Qty: 30 TABLET | Refills: 0 | Status: SHIPPED | OUTPATIENT
Start: 2018-09-17 | End: 2018-10-22 | Stop reason: SDUPTHER

## 2018-10-01 RX ORDER — DONEPEZIL HYDROCHLORIDE 5 MG/1
TABLET, FILM COATED ORAL
Qty: 30 TABLET | Refills: 2 | Status: SHIPPED | OUTPATIENT
Start: 2018-10-01 | End: 2019-01-06 | Stop reason: SDUPTHER

## 2018-10-22 RX ORDER — ALLOPURINOL 100 MG/1
TABLET ORAL
Qty: 30 TABLET | Refills: 0 | Status: SHIPPED | OUTPATIENT
Start: 2018-10-22 | End: 2018-11-19 | Stop reason: SDUPTHER

## 2018-11-19 RX ORDER — ALLOPURINOL 100 MG/1
TABLET ORAL
Qty: 30 TABLET | Refills: 0 | Status: SHIPPED | OUTPATIENT
Start: 2018-11-19 | End: 2019-03-17 | Stop reason: SDUPTHER

## 2018-11-19 RX ORDER — ALLOPURINOL 100 MG/1
TABLET ORAL
Qty: 30 TABLET | Refills: 0 | Status: SHIPPED | OUTPATIENT
Start: 2018-11-19 | End: 2018-11-19 | Stop reason: SDUPTHER

## 2018-12-06 ENCOUNTER — LAB (OUTPATIENT)
Dept: FAMILY MEDICINE CLINIC | Facility: CLINIC | Age: 83
End: 2018-12-06

## 2018-12-06 DIAGNOSIS — E78.5 HYPERLIPIDEMIA, UNSPECIFIED HYPERLIPIDEMIA TYPE: ICD-10-CM

## 2018-12-06 DIAGNOSIS — I10 ESSENTIAL HYPERTENSION, MALIGNANT: ICD-10-CM

## 2018-12-06 DIAGNOSIS — M1A.9XX0 CHRONIC GOUT WITHOUT TOPHUS, UNSPECIFIED CAUSE, UNSPECIFIED SITE: Primary | ICD-10-CM

## 2018-12-06 LAB
ALBUMIN SERPL-MCNC: 4 G/DL (ref 3.5–5.2)
ALBUMIN/GLOB SERPL: 1.5 G/DL
ALP SERPL-CCNC: 78 U/L (ref 39–117)
ALT SERPL W P-5'-P-CCNC: 11 U/L (ref 1–33)
ANION GAP SERPL CALCULATED.3IONS-SCNC: 9.4 MMOL/L
AST SERPL-CCNC: 18 U/L (ref 1–32)
BILIRUB SERPL-MCNC: 0.5 MG/DL (ref 0.1–1.2)
BUN BLD-MCNC: 27 MG/DL (ref 8–23)
BUN/CREAT SERPL: 16.2 (ref 7–25)
CALCIUM SPEC-SCNC: 9.2 MG/DL (ref 8.6–10.5)
CHLORIDE SERPL-SCNC: 105 MMOL/L (ref 98–107)
CHOLEST SERPL-MCNC: 142 MG/DL (ref 0–200)
CO2 SERPL-SCNC: 26.6 MMOL/L (ref 22–29)
CREAT BLD-MCNC: 1.67 MG/DL (ref 0.57–1)
ERYTHROCYTE [DISTWIDTH] IN BLOOD BY AUTOMATED COUNT: 15 % (ref 4.5–15)
GFR SERPL CREATININE-BSD FRML MDRD: 29 ML/MIN/1.73
GLOBULIN UR ELPH-MCNC: 2.7 GM/DL
GLUCOSE BLD-MCNC: 111 MG/DL (ref 65–99)
HCT VFR BLD AUTO: 38.3 % (ref 31–42)
HDLC SERPL-MCNC: 63 MG/DL (ref 40–60)
HGB BLD-MCNC: 12.1 G/DL (ref 12–18)
LDLC SERPL CALC-MCNC: 64 MG/DL (ref 0–100)
LDLC/HDLC SERPL: 1.02 {RATIO}
LYMPHOCYTES # BLD AUTO: 2.1 10*3/MM3 (ref 1.2–3.4)
LYMPHOCYTES NFR BLD AUTO: 34.5 % (ref 21–51)
MCH RBC QN AUTO: 30.2 PG (ref 26.1–33.1)
MCHC RBC AUTO-ENTMCNC: 31.7 G/DL (ref 33–37)
MCV RBC AUTO: 95.4 FL (ref 80–99)
MONOCYTES # BLD AUTO: 0.5 10*3/MM3 (ref 0.1–0.6)
MONOCYTES NFR BLD AUTO: 8.3 % (ref 2–9)
NEUTROPHILS # BLD AUTO: 3.5 10*3/MM3 (ref 1.4–6.5)
NEUTROPHILS NFR BLD AUTO: 57.2 % (ref 42–75)
PLATELET # BLD AUTO: 169 10*3/MM3 (ref 150–450)
PMV BLD AUTO: 6.8 FL (ref 7.1–10.5)
POTASSIUM BLD-SCNC: 4.4 MMOL/L (ref 3.5–5.2)
PROT SERPL-MCNC: 6.7 G/DL (ref 6–8.5)
RBC # BLD AUTO: 4.02 10*6/MM3 (ref 4–6)
SODIUM BLD-SCNC: 141 MMOL/L (ref 136–145)
TRIGL SERPL-MCNC: 75 MG/DL (ref 0–150)
URATE SERPL-MCNC: 5.4 MG/DL (ref 2.4–5.7)
VLDLC SERPL-MCNC: 15 MG/DL (ref 5–40)
WBC NRBC COR # BLD: 6.2 10*3/MM3 (ref 4.5–10)

## 2018-12-06 PROCEDURE — 85025 COMPLETE CBC W/AUTO DIFF WBC: CPT | Performed by: INTERNAL MEDICINE

## 2018-12-06 PROCEDURE — 36415 COLL VENOUS BLD VENIPUNCTURE: CPT | Performed by: INTERNAL MEDICINE

## 2018-12-06 PROCEDURE — 80053 COMPREHEN METABOLIC PANEL: CPT | Performed by: INTERNAL MEDICINE

## 2018-12-06 PROCEDURE — 84550 ASSAY OF BLOOD/URIC ACID: CPT | Performed by: INTERNAL MEDICINE

## 2018-12-06 PROCEDURE — 80061 LIPID PANEL: CPT | Performed by: INTERNAL MEDICINE

## 2018-12-13 ENCOUNTER — OFFICE VISIT (OUTPATIENT)
Dept: FAMILY MEDICINE CLINIC | Facility: CLINIC | Age: 83
End: 2018-12-13

## 2018-12-13 VITALS
SYSTOLIC BLOOD PRESSURE: 126 MMHG | WEIGHT: 109 LBS | BODY MASS INDEX: 21.4 KG/M2 | DIASTOLIC BLOOD PRESSURE: 60 MMHG | HEIGHT: 60 IN | OXYGEN SATURATION: 99 % | TEMPERATURE: 98 F | HEART RATE: 55 BPM | RESPIRATION RATE: 12 BRPM

## 2018-12-13 DIAGNOSIS — E03.9 ADULT HYPOTHYROIDISM: ICD-10-CM

## 2018-12-13 DIAGNOSIS — K21.00 GASTROESOPHAGEAL REFLUX DISEASE WITH ESOPHAGITIS: ICD-10-CM

## 2018-12-13 DIAGNOSIS — E55.9 VITAMIN D DEFICIENCY: ICD-10-CM

## 2018-12-13 DIAGNOSIS — I10 BENIGN ESSENTIAL HTN: Primary | ICD-10-CM

## 2018-12-13 PROCEDURE — 99214 OFFICE O/P EST MOD 30 MIN: CPT | Performed by: INTERNAL MEDICINE

## 2018-12-13 NOTE — PROGRESS NOTES
Subjective   Christine Wharton is a 86 y.o. female.     History of Present Illness   Patient was seen for hypertension.  Blood pressures been running 120s over 60s.  Gastroesophageal reflux is stabilized on over-the-counter medication.  Her thyroid also controlled with her Synthroid.  She will continue to monitor her blood pressure return to our clinic in 6 months.    Dictated utilizing Dragon dictation. If there are questions or for further clarification, please contact me.   The following portions of the patient's history were reviewed and updated as appropriate: allergies, current medications, past family history, past medical history, past social history, past surgical history and problem list.    Review of Systems    Objective   Physical Exam    Assessment/Plan   Problems Addressed this Visit        Cardiovascular and Mediastinum    Benign essential HTN - Primary    Relevant Orders    Comprehensive Metabolic Panel    CBC & Differential    Thyroid Panel With TSH    Lipid Panel    Vitamin D 25 Hydroxy       Digestive    Gastroesophageal reflux disease with esophagitis    Relevant Orders    Comprehensive Metabolic Panel    CBC & Differential    Thyroid Panel With TSH    Lipid Panel    Vitamin D 25 Hydroxy       Endocrine    Adult hypothyroidism    Relevant Orders    Comprehensive Metabolic Panel    CBC & Differential    Thyroid Panel With TSH    Lipid Panel    Vitamin D 25 Hydroxy      Other Visit Diagnoses     Vitamin D deficiency         Relevant Orders    Vitamin D 25 Hydroxy

## 2018-12-23 RX ORDER — HYDROXYZINE PAMOATE 25 MG/1
CAPSULE ORAL
Qty: 40 CAPSULE | Refills: 1 | Status: SHIPPED | OUTPATIENT
Start: 2018-12-23 | End: 2020-12-15

## 2018-12-26 ENCOUNTER — TELEPHONE (OUTPATIENT)
Dept: FAMILY MEDICINE CLINIC | Facility: CLINIC | Age: 83
End: 2018-12-26

## 2019-01-07 RX ORDER — DONEPEZIL HYDROCHLORIDE 5 MG/1
TABLET, FILM COATED ORAL
Qty: 30 TABLET | Refills: 1 | Status: SHIPPED | OUTPATIENT
Start: 2019-01-07 | End: 2019-03-17 | Stop reason: SDUPTHER

## 2019-03-18 RX ORDER — ALLOPURINOL 100 MG/1
TABLET ORAL
Qty: 30 TABLET | Refills: 0 | Status: SHIPPED | OUTPATIENT
Start: 2019-03-18 | End: 2019-04-14 | Stop reason: SDUPTHER

## 2019-03-18 RX ORDER — DONEPEZIL HYDROCHLORIDE 5 MG/1
TABLET, FILM COATED ORAL
Qty: 30 TABLET | Refills: 0 | Status: SHIPPED | OUTPATIENT
Start: 2019-03-18 | End: 2019-04-14 | Stop reason: SDUPTHER

## 2019-04-15 RX ORDER — DONEPEZIL HYDROCHLORIDE 5 MG/1
TABLET, FILM COATED ORAL
Qty: 30 TABLET | Refills: 0 | Status: SHIPPED | OUTPATIENT
Start: 2019-04-15 | End: 2019-05-12 | Stop reason: SDUPTHER

## 2019-04-15 RX ORDER — ALLOPURINOL 100 MG/1
TABLET ORAL
Qty: 30 TABLET | Refills: 0 | Status: SHIPPED | OUTPATIENT
Start: 2019-04-15 | End: 2019-05-19 | Stop reason: SDUPTHER

## 2019-05-13 RX ORDER — DONEPEZIL HYDROCHLORIDE 5 MG/1
TABLET, FILM COATED ORAL
Qty: 30 TABLET | Refills: 0 | Status: SHIPPED | OUTPATIENT
Start: 2019-05-13 | End: 2019-06-09 | Stop reason: SDUPTHER

## 2019-05-20 RX ORDER — ALLOPURINOL 100 MG/1
TABLET ORAL
Qty: 30 TABLET | Refills: 0 | Status: SHIPPED | OUTPATIENT
Start: 2019-05-20 | End: 2019-06-16 | Stop reason: SDUPTHER

## 2019-06-03 RX ORDER — LEVOTHYROXINE SODIUM 125 MCG
TABLET ORAL
Qty: 45 TABLET | Refills: 2 | Status: SHIPPED | OUTPATIENT
Start: 2019-06-03 | End: 2020-03-13

## 2019-06-10 RX ORDER — DONEPEZIL HYDROCHLORIDE 5 MG/1
TABLET, FILM COATED ORAL
Qty: 30 TABLET | Refills: 0 | Status: SHIPPED | OUTPATIENT
Start: 2019-06-10 | End: 2019-07-21 | Stop reason: SDUPTHER

## 2019-06-17 RX ORDER — ALLOPURINOL 100 MG/1
TABLET ORAL
Qty: 30 TABLET | Refills: 0 | Status: SHIPPED | OUTPATIENT
Start: 2019-06-17 | End: 2019-07-21 | Stop reason: SDUPTHER

## 2019-07-22 RX ORDER — DONEPEZIL HYDROCHLORIDE 5 MG/1
TABLET, FILM COATED ORAL
Qty: 30 TABLET | Refills: 0 | Status: SHIPPED | OUTPATIENT
Start: 2019-07-22 | End: 2019-08-26 | Stop reason: SDUPTHER

## 2019-07-22 RX ORDER — ALLOPURINOL 100 MG/1
TABLET ORAL
Qty: 30 TABLET | Refills: 0 | Status: SHIPPED | OUTPATIENT
Start: 2019-07-22 | End: 2019-08-26 | Stop reason: SDUPTHER

## 2019-08-26 ENCOUNTER — TELEPHONE (OUTPATIENT)
Dept: FAMILY MEDICINE CLINIC | Facility: CLINIC | Age: 84
End: 2019-08-26

## 2019-08-26 RX ORDER — DONEPEZIL HYDROCHLORIDE 5 MG/1
5 TABLET, FILM COATED ORAL NIGHTLY
Qty: 17 TABLET | Refills: 0 | Status: SHIPPED | OUTPATIENT
Start: 2019-08-26 | End: 2019-09-08 | Stop reason: SDUPTHER

## 2019-08-26 RX ORDER — ALLOPURINOL 100 MG/1
100 TABLET ORAL DAILY
Qty: 17 TABLET | Refills: 0 | Status: SHIPPED | OUTPATIENT
Start: 2019-08-26 | End: 2019-09-22 | Stop reason: SDUPTHER

## 2019-08-26 NOTE — TELEPHONE ENCOUNTER
REFILL ON allopurinol (ZYLOPRIM) 100 MG tablet  donepezil (ARICEPT) 5 MG tablet      PATIENT IS OUT OF MEDICATION AND HAS AN APPT FOR 09/11/19. CAN THESE BE FILLED UNTIL THEN

## 2019-08-27 ENCOUNTER — TELEPHONE (OUTPATIENT)
Dept: FAMILY MEDICINE CLINIC | Facility: CLINIC | Age: 84
End: 2019-08-27

## 2019-09-09 RX ORDER — DONEPEZIL HYDROCHLORIDE 5 MG/1
TABLET, FILM COATED ORAL
Qty: 17 TABLET | Refills: 0 | Status: SHIPPED | OUTPATIENT
Start: 2019-09-09 | End: 2019-10-06 | Stop reason: SDUPTHER

## 2019-09-23 RX ORDER — ALLOPURINOL 100 MG/1
TABLET ORAL
Qty: 17 TABLET | Refills: 0 | Status: SHIPPED | OUTPATIENT
Start: 2019-09-23 | End: 2019-10-13 | Stop reason: SDUPTHER

## 2019-10-07 RX ORDER — DONEPEZIL HYDROCHLORIDE 5 MG/1
TABLET, FILM COATED ORAL
Qty: 17 TABLET | Refills: 0 | Status: SHIPPED | OUTPATIENT
Start: 2019-10-07 | End: 2019-11-04 | Stop reason: SDUPTHER

## 2019-10-11 ENCOUNTER — OFFICE VISIT (OUTPATIENT)
Dept: FAMILY MEDICINE CLINIC | Facility: CLINIC | Age: 84
End: 2019-10-11

## 2019-10-11 VITALS
SYSTOLIC BLOOD PRESSURE: 120 MMHG | WEIGHT: 113.6 LBS | BODY MASS INDEX: 22.3 KG/M2 | OXYGEN SATURATION: 98 % | HEART RATE: 48 BPM | TEMPERATURE: 98 F | HEIGHT: 60 IN | DIASTOLIC BLOOD PRESSURE: 50 MMHG

## 2019-10-11 DIAGNOSIS — K21.00 GASTROESOPHAGEAL REFLUX DISEASE WITH ESOPHAGITIS: ICD-10-CM

## 2019-10-11 DIAGNOSIS — E03.9 ADULT HYPOTHYROIDISM: ICD-10-CM

## 2019-10-11 DIAGNOSIS — I10 BENIGN ESSENTIAL HTN: Primary | ICD-10-CM

## 2019-10-11 DIAGNOSIS — N18.30 CHRONIC KIDNEY DISEASE, STAGE III (MODERATE) (HCC): ICD-10-CM

## 2019-10-11 PROBLEM — G30.1 LATE ONSET ALZHEIMER'S DISEASE WITHOUT BEHAVIORAL DISTURBANCE (HCC): Status: ACTIVE | Noted: 2019-10-11

## 2019-10-11 PROBLEM — F02.80 LATE ONSET ALZHEIMER'S DISEASE WITHOUT BEHAVIORAL DISTURBANCE (HCC): Status: ACTIVE | Noted: 2019-10-11

## 2019-10-11 LAB
25(OH)D3 SERPL-MCNC: 38.6 NG/ML (ref 30–100)
ALBUMIN SERPL-MCNC: 4.4 G/DL (ref 3.5–5.2)
ALBUMIN/GLOB SERPL: 1.6 G/DL
ALP SERPL-CCNC: 76 U/L (ref 39–117)
ALT SERPL W P-5'-P-CCNC: 8 U/L (ref 1–33)
ANION GAP SERPL CALCULATED.3IONS-SCNC: 12.4 MMOL/L (ref 5–15)
AST SERPL-CCNC: 19 U/L (ref 1–32)
BILIRUB SERPL-MCNC: 0.4 MG/DL (ref 0.2–1.2)
BUN BLD-MCNC: 41 MG/DL (ref 8–23)
BUN/CREAT SERPL: 21.8 (ref 7–25)
CALCIUM SPEC-SCNC: 8.8 MG/DL (ref 8.6–10.5)
CHLORIDE SERPL-SCNC: 100 MMOL/L (ref 98–107)
CHOLEST SERPL-MCNC: 144 MG/DL (ref 0–200)
CO2 SERPL-SCNC: 26.6 MMOL/L (ref 22–29)
CREAT BLD-MCNC: 1.88 MG/DL (ref 0.57–1)
DEPRECATED RDW RBC AUTO: 42.4 FL (ref 37–54)
ERYTHROCYTE [DISTWIDTH] IN BLOOD BY AUTOMATED COUNT: 12.4 % (ref 12.3–15.4)
GFR SERPL CREATININE-BSD FRML MDRD: 25 ML/MIN/1.73
GLOBULIN UR ELPH-MCNC: 2.8 GM/DL
GLUCOSE BLD-MCNC: 105 MG/DL (ref 65–99)
HCT VFR BLD AUTO: 35.1 % (ref 34–46.6)
HDLC SERPL-MCNC: 55 MG/DL (ref 40–60)
HGB BLD-MCNC: 11.7 G/DL (ref 12–15.9)
LDLC SERPL CALC-MCNC: 64 MG/DL (ref 0–100)
LDLC/HDLC SERPL: 1.16 {RATIO}
MCH RBC QN AUTO: 31.5 PG (ref 26.6–33)
MCHC RBC AUTO-ENTMCNC: 33.3 G/DL (ref 31.5–35.7)
MCV RBC AUTO: 94.4 FL (ref 79–97)
PLATELET # BLD AUTO: 173 10*3/MM3 (ref 140–450)
PMV BLD AUTO: 9.3 FL (ref 6–12)
POTASSIUM BLD-SCNC: 5.1 MMOL/L (ref 3.5–5.2)
PROT SERPL-MCNC: 7.2 G/DL (ref 6–8.5)
RBC # BLD AUTO: 3.72 10*6/MM3 (ref 3.77–5.28)
SODIUM BLD-SCNC: 139 MMOL/L (ref 136–145)
T-UPTAKE NFR SERPL: 1.01 TBI (ref 0.8–1.3)
T4 SERPL-MCNC: 7.26 MCG/DL (ref 4.5–11.7)
TRIGL SERPL-MCNC: 125 MG/DL (ref 0–150)
TSH SERPL DL<=0.05 MIU/L-ACNC: 3.62 UIU/ML (ref 0.27–4.2)
URATE SERPL-MCNC: 6 MG/DL (ref 2.4–5.7)
VLDLC SERPL-MCNC: 25 MG/DL (ref 5–40)
WBC NRBC COR # BLD: 6.25 10*3/MM3 (ref 3.4–10.8)

## 2019-10-11 PROCEDURE — 80053 COMPREHEN METABOLIC PANEL: CPT | Performed by: INTERNAL MEDICINE

## 2019-10-11 PROCEDURE — 80061 LIPID PANEL: CPT | Performed by: INTERNAL MEDICINE

## 2019-10-11 PROCEDURE — G0008 ADMIN INFLUENZA VIRUS VAC: HCPCS | Performed by: INTERNAL MEDICINE

## 2019-10-11 PROCEDURE — 85027 COMPLETE CBC AUTOMATED: CPT | Performed by: INTERNAL MEDICINE

## 2019-10-11 PROCEDURE — 99213 OFFICE O/P EST LOW 20 MIN: CPT | Performed by: INTERNAL MEDICINE

## 2019-10-11 PROCEDURE — 84550 ASSAY OF BLOOD/URIC ACID: CPT | Performed by: INTERNAL MEDICINE

## 2019-10-11 PROCEDURE — 90653 IIV ADJUVANT VACCINE IM: CPT | Performed by: INTERNAL MEDICINE

## 2019-10-11 PROCEDURE — 84443 ASSAY THYROID STIM HORMONE: CPT | Performed by: INTERNAL MEDICINE

## 2019-10-11 PROCEDURE — 82306 VITAMIN D 25 HYDROXY: CPT | Performed by: INTERNAL MEDICINE

## 2019-10-11 PROCEDURE — 84436 ASSAY OF TOTAL THYROXINE: CPT | Performed by: INTERNAL MEDICINE

## 2019-10-11 PROCEDURE — 84479 ASSAY OF THYROID (T3 OR T4): CPT | Performed by: INTERNAL MEDICINE

## 2019-10-11 PROCEDURE — 36415 COLL VENOUS BLD VENIPUNCTURE: CPT | Performed by: INTERNAL MEDICINE

## 2019-10-11 NOTE — PROGRESS NOTES
Subjective   Christine Wharton is a 87 y.o. female.     History of Present Illness   Patient being seen for Alzheimer's.  Patient is continuing medication with partial success.  Patient did have labs drawn will follow-up in 4 days.    Dictated utilizing Dragon dictation. If there are questions or for further clarification, please contact me.  The following portions of the patient's history were reviewed and updated as appropriate: allergies, current medications, past family history, past medical history, past social history, past surgical history and problem list.    Review of Systems   Constitutional: Negative for fatigue and fever.   HENT: Positive for congestion. Negative for trouble swallowing.    Eyes: Negative for discharge and visual disturbance.   Respiratory: Negative for choking and shortness of breath.    Cardiovascular: Negative for chest pain and palpitations.   Gastrointestinal: Negative for abdominal pain and blood in stool.   Endocrine: Negative.    Genitourinary: Negative for genital sores and hematuria.   Musculoskeletal: Negative for gait problem and joint swelling.   Skin: Negative for color change, pallor, rash and wound.   Allergic/Immunologic: Positive for environmental allergies. Negative for immunocompromised state.   Neurological: Negative for facial asymmetry and speech difficulty.   Psychiatric/Behavioral: Positive for confusion and decreased concentration. Negative for hallucinations and suicidal ideas.       Objective   Physical Exam   Constitutional: She appears well-developed and well-nourished.   HENT:   Head: Normocephalic.   Eyes: Conjunctivae are normal. Pupils are equal, round, and reactive to light.   Neck: Normal range of motion. Neck supple.   Cardiovascular: Normal rate, regular rhythm and normal heart sounds.   Pulmonary/Chest: Effort normal and breath sounds normal.   Abdominal: Soft. Bowel sounds are normal.   Musculoskeletal: Normal range of motion.   Skin: Skin is warm and  dry.   Psychiatric:   Patient with moderate Alzheimer's   Nursing note and vitals reviewed.      Assessment/Plan Continue present Alzheimer's treatment #2 return to clinic in 6 months  Christine was seen today for follow-up.    Diagnoses and all orders for this visit:    Benign essential HTN  -     CBC (No Diff)  -     Comprehensive Metabolic Panel  -     Thyroid Panel With TSH  -     Lipid Panel  -     Vitamin D 25 Hydroxy  -     Uric Acid    Gastroesophageal reflux disease with esophagitis  -     CBC (No Diff)  -     Comprehensive Metabolic Panel  -     Thyroid Panel With TSH  -     Lipid Panel  -     Vitamin D 25 Hydroxy  -     Uric Acid    Chronic kidney disease, stage III (moderate) (CMS/HCC)  -     CBC (No Diff)  -     Comprehensive Metabolic Panel  -     Thyroid Panel With TSH  -     Lipid Panel  -     Vitamin D 25 Hydroxy  -     Uric Acid    Adult hypothyroidism  -     CBC (No Diff)  -     Comprehensive Metabolic Panel  -     Thyroid Panel With TSH  -     Lipid Panel  -     Vitamin D 25 Hydroxy  -     Uric Acid    Other orders  -     Fluad Tri 65yr ([id number])

## 2019-10-14 RX ORDER — ALLOPURINOL 100 MG/1
TABLET ORAL
Qty: 17 TABLET | Refills: 0 | Status: SHIPPED | OUTPATIENT
Start: 2019-10-14 | End: 2019-11-04 | Stop reason: SDUPTHER

## 2019-10-15 ENCOUNTER — TELEPHONE (OUTPATIENT)
Dept: FAMILY MEDICINE CLINIC | Facility: CLINIC | Age: 84
End: 2019-10-15

## 2019-11-04 RX ORDER — ALLOPURINOL 100 MG/1
TABLET ORAL
Qty: 17 TABLET | Refills: 0 | Status: SHIPPED | OUTPATIENT
Start: 2019-11-04 | End: 2019-11-07 | Stop reason: SDUPTHER

## 2019-11-04 RX ORDER — DONEPEZIL HYDROCHLORIDE 5 MG/1
TABLET, FILM COATED ORAL
Qty: 17 TABLET | Refills: 0 | Status: SHIPPED | OUTPATIENT
Start: 2019-11-04 | End: 2019-11-11 | Stop reason: SDUPTHER

## 2019-11-07 ENCOUNTER — TELEPHONE (OUTPATIENT)
Dept: FAMILY MEDICINE CLINIC | Facility: CLINIC | Age: 84
End: 2019-11-07

## 2019-11-07 RX ORDER — ALLOPURINOL 100 MG/1
100 TABLET ORAL DAILY
Qty: 90 TABLET | Refills: 1 | Status: SHIPPED | OUTPATIENT
Start: 2019-11-07 | End: 2020-06-16

## 2019-11-07 NOTE — TELEPHONE ENCOUNTER
Pt allopurinol keeps getting filled as qty of 17 this is incorrect and would like this to be fixed. Pt is upset can you please fix this and send over the correct qty with refills to pharm on file.     Pt was last seen 10/11/2019

## 2019-11-11 RX ORDER — DONEPEZIL HYDROCHLORIDE 5 MG/1
5 TABLET, FILM COATED ORAL NIGHTLY
Qty: 90 TABLET | Refills: 1 | Status: SHIPPED | OUTPATIENT
Start: 2019-11-11 | End: 2020-06-01

## 2019-11-11 NOTE — TELEPHONE ENCOUNTER
Pt's  just called because her rx for donepezil (ARICEPT) 5 MG tablet was changed to qty 17 as well, needs this fixed because they need refills on it along with one other medicine and he doesn't want it ordered until it is corrected so she gets the full qty.    Wants a call back when it's corrected please

## 2020-02-24 ENCOUNTER — TELEPHONE (OUTPATIENT)
Dept: FAMILY MEDICINE CLINIC | Facility: CLINIC | Age: 85
End: 2020-02-24

## 2020-02-24 NOTE — TELEPHONE ENCOUNTER
Pt wants to speak with you about patient dementia. Asking if there is a medication she could take to help.

## 2020-02-25 NOTE — TELEPHONE ENCOUNTER
Patient informed re: meds , edelson and maybe even neurologist. He said he would call back if needed us, we had already referred him to [facility] and they cancelled

## 2020-03-06 ENCOUNTER — TELEPHONE (OUTPATIENT)
Dept: FAMILY MEDICINE CLINIC | Facility: CLINIC | Age: 85
End: 2020-03-06

## 2020-03-06 NOTE — TELEPHONE ENCOUNTER
Patient  informed re: have a 1:15 or 330 on 3/13 for form fill out same day  They are calling back

## 2020-03-13 RX ORDER — LEVOTHYROXINE SODIUM 125 MCG
TABLET ORAL
Qty: 45 TABLET | Refills: 1 | Status: SHIPPED | OUTPATIENT
Start: 2020-03-13 | End: 2020-08-31

## 2020-04-02 ENCOUNTER — TELEPHONE (OUTPATIENT)
Dept: FAMILY MEDICINE CLINIC | Facility: CLINIC | Age: 85
End: 2020-04-02

## 2020-04-02 DIAGNOSIS — Z11.1 SCREENING-PULMONARY TB: Primary | ICD-10-CM

## 2020-04-02 NOTE — TELEPHONE ENCOUNTER
Patients son calling in asking about the paperwork he had dropped off for the patient for  to fill out, wondering if they are ready to be picked up, needs to get his mother into a memory home and needs that paperwork

## 2020-04-02 NOTE — TELEPHONE ENCOUNTER
Son says wants you to fill out paperwork from your 10/19 visit and if you cant he could talk to you on phone call him at [phone] (paper on your desk)

## 2020-04-02 NOTE — TELEPHONE ENCOUNTER
Alexia has paper awaiting son to call back find out if mrs rizvi using walker and list of meds on then form will be complete

## 2020-04-03 NOTE — TELEPHONE ENCOUNTER
Patient's son called and gave med list for paper work      Meds:   Synthroid   Allopurinol  Carvedilol by Dr. Wade  Nifedipine ER 30 mg prescribed by  Dr. Theodora Christian   Donepezil      Those are all she is taking currently     Pt is not using a walker and will need a TB test for admission.     Call back     [phone]

## 2020-06-01 RX ORDER — DONEPEZIL HYDROCHLORIDE 5 MG/1
TABLET, FILM COATED ORAL
Qty: 90 TABLET | Refills: 0 | Status: SHIPPED | OUTPATIENT
Start: 2020-06-01 | End: 2020-06-16

## 2020-06-16 RX ORDER — ALLOPURINOL 100 MG/1
TABLET ORAL
Qty: 90 TABLET | Refills: 0 | Status: SHIPPED | OUTPATIENT
Start: 2020-06-16 | End: 2020-09-21

## 2020-06-16 RX ORDER — DONEPEZIL HYDROCHLORIDE 5 MG/1
TABLET, FILM COATED ORAL
Qty: 90 TABLET | Refills: 0 | Status: SHIPPED | OUTPATIENT
Start: 2020-06-16 | End: 2021-01-11

## 2020-06-18 ENCOUNTER — OFFICE VISIT (OUTPATIENT)
Dept: FAMILY MEDICINE CLINIC | Facility: CLINIC | Age: 85
End: 2020-06-18

## 2020-06-18 VITALS
HEIGHT: 60 IN | HEART RATE: 65 BPM | SYSTOLIC BLOOD PRESSURE: 140 MMHG | DIASTOLIC BLOOD PRESSURE: 58 MMHG | OXYGEN SATURATION: 97 % | TEMPERATURE: 98 F | WEIGHT: 107.4 LBS | BODY MASS INDEX: 21.09 KG/M2

## 2020-06-18 DIAGNOSIS — Z00.00 MEDICARE ANNUAL WELLNESS VISIT, SUBSEQUENT: Primary | ICD-10-CM

## 2020-06-18 DIAGNOSIS — E55.9 VITAMIN D DEFICIENCY, UNSPECIFIED: ICD-10-CM

## 2020-06-18 DIAGNOSIS — K21.00 GASTROESOPHAGEAL REFLUX DISEASE WITH ESOPHAGITIS: ICD-10-CM

## 2020-06-18 DIAGNOSIS — I10 BENIGN ESSENTIAL HTN: ICD-10-CM

## 2020-06-18 DIAGNOSIS — F02.80 LATE ONSET ALZHEIMER'S DISEASE WITHOUT BEHAVIORAL DISTURBANCE (HCC): ICD-10-CM

## 2020-06-18 DIAGNOSIS — G30.1 LATE ONSET ALZHEIMER'S DISEASE WITHOUT BEHAVIORAL DISTURBANCE (HCC): ICD-10-CM

## 2020-06-18 DIAGNOSIS — E03.9 ADULT HYPOTHYROIDISM: ICD-10-CM

## 2020-06-18 DIAGNOSIS — M1A.9XX0 CHRONIC GOUT WITHOUT TOPHUS, UNSPECIFIED CAUSE, UNSPECIFIED SITE: ICD-10-CM

## 2020-06-18 PROCEDURE — 96160 PT-FOCUSED HLTH RISK ASSMT: CPT | Performed by: INTERNAL MEDICINE

## 2020-06-18 PROCEDURE — 99214 OFFICE O/P EST MOD 30 MIN: CPT | Performed by: INTERNAL MEDICINE

## 2020-06-18 PROCEDURE — G0439 PPPS, SUBSEQ VISIT: HCPCS | Performed by: INTERNAL MEDICINE

## 2020-06-18 RX ORDER — NIFEDIPINE 30 MG/1
TABLET, EXTENDED RELEASE ORAL
COMMUNITY
Start: 2019-12-23 | End: 2020-12-21 | Stop reason: DRUGHIGH

## 2020-06-18 NOTE — PATIENT INSTRUCTIONS
Medicare Wellness  Personal Prevention Plan of Service     Date of Office Visit:  2020  Encounter Provider:  Louis Valdez MD  Place of Service:  [facility]  Patient Name: Christine Wharton  :  1932    As part of the Medicare Wellness portion of your visit today, we are providing you with this personalized preventive plan of services (PPPS). This plan is based upon recommendations of the [place] Preventive Services Task Force (USPSTF) and the Advisory Committee on Immunization Practices (ACIP).    This lists the preventive care services that should be considered, and provides dates of when you are due. Items listed as completed are up-to-date and do not require any further intervention.    Health Maintenance   Topic Date Due   • TDAP/TD VACCINES (1 - Tdap) 1943   • Pneumococcal Vaccine Once at 65 Years Old  1997   • MEDICARE ANNUAL WELLNESS  2018   • MAMMOGRAM  2019   • INFLUENZA VACCINE  2020   • LIPID PANEL  10/11/2020   • ZOSTER VACCINE  Addressed       No orders of the defined types were placed in this encounter.      No follow-ups on file.

## 2020-06-18 NOTE — PROGRESS NOTES
Subsequent Medicare Wellness Visit   The ABC's of the Annual Wellness Visit    Chief Complaint   Patient presents with   • Medicare Wellness-subsequent   • blood work needed       HPI:  Christine Wharton, -1932, is a 88 y.o. female who presents for a Subsequent Medicare Wellness Visit.  Patient was seen for Medicare wellness exam.  Patient was seen for hypertension.  Blood pressures been running 140s over 80s.  Patient does have late stage Alzheimer's take and donepezil.  Patient also has a history of gout.  Her latest uric acid is 6.0.  Patient is taking allopurinol.  Patient controls her reflux with over-the-counter medication.  Patient's thyroid is being controlled with Synthyroid.  She did have labs drawn today.  Patient has a vitamin D3 deficiency supplemented with over-the-counter D3.    Dictated utilizing Dragon dictation. If there are questions or for further clarification, please contact me.  Recent Hospitalizations:  No hospitalization(s) within the last year..    Current Medical Providers:  Patient Care Team:  Louis Valdez MD as PCP - General (Internal Medicine)  Sonu Yip Jr., MD as Consulting Physician (Cardiology)  Louis Lee MD as Consulting Physician (Nephrology)    Health Habits and Functional and Cognitive Screening and Depression Screening:  Functional & Cognitive Status 2020   Do you have difficulty preparing food and eating? No   Do you have difficulty bathing yourself, getting dressed or grooming yourself? No   Do you have difficulty using the toilet? No   Do you have difficulty moving around from place to place? No   Do you have trouble with steps or getting out of a bed or a chair? No   Current Diet Well Balanced Diet   Dental Exam Up to date   Eye Exam Up to date   Exercise (times per week) 7 times per week   Current Exercise Activities Include Aerobics   Do you need help using the phone?  Yes   Are you deaf or do you have serious difficulty hearing?  No   Do you  need help with transportation? Yes   Do you need help shopping? Yes   Do you need help preparing meals?  Yes   Do you need help with housework?  Yes   Do you need help with laundry? Yes   Do you need help taking your medications? Yes   Do you need help managing money? Yes   Do you ever drive or ride in a car without wearing a seat belt? Yes   Have you felt unusual stress, anger or loneliness in the last month? No   Who do you live with? Spouse   If you need help, do you have trouble finding someone available to you? No   Have you been bothered in the last four weeks by sexual problems? No   Do you have difficulty concentrating, remembering or making decisions? No       Compared to one year ago, the patient feels her physical health is the same and her mental health is worse.    Depression Screen:  PHQ-2/PHQ-9 Depression Screening 6/18/2020   Little interest or pleasure in doing things 0   Feeling down, depressed, or hopeless 0   Trouble falling or staying asleep, or sleeping too much 1   Feeling tired or having little energy 1   Poor appetite or overeating 1   Feeling bad about yourself - or that you are a failure or have let yourself or your family down 1   Trouble concentrating on things, such as reading the newspaper or watching television 0   Moving or speaking so slowly that other people could have noticed. Or the opposite - being so fidgety or restless that you have been moving around a lot more than usual 0   Thoughts that you would be better off dead, or of hurting yourself in some way 1   Total Score 5   If you checked off any problems, how difficult have these problems made it for you to do your work, take care of things at home, or get along with other people? Somewhat difficult         Past Medical/Family/Social History:  The following portions of the patient's history were reviewed and updated as appropriate: allergies, current medications, past family history, past medical history, past social history,  past surgical history and problem list.    Allergies   Allergen Reactions   • Statins    • Sulfa Antibiotics          Current Outpatient Medications:   •  allopurinol (ZYLOPRIM) 100 MG tablet, TAKE ONE TABLET BY MOUTH DAILY, Disp: 90 tablet, Rfl: 0  •  aspirin 81 MG tablet, Take 1 tablet by mouth daily., Disp: , Rfl:   •  carvedilol (COREG) 25 MG tablet, TAKE ONE TABLET BY MOUTH TWICE A DAY, Disp: 180 tablet, Rfl: 0  •  donepezil (ARICEPT) 5 MG tablet, TAKE ONE TABLET BY MOUTH ONCE NIGHTLY, Disp: 90 tablet, Rfl: 0  •  NIFEdipine XL (PROCARDIA XL) 30 MG 24 hr tablet, TAKE ONE TABLET BY MOUTH EVERY 12 HOURS, Disp: , Rfl:   •  rosuvastatin (CRESTOR) 5 MG tablet, Take 0.5 tablets by mouth Daily., Disp: 15 tablet, Rfl: 11  •  SYNTHROID 125 MCG tablet, TAKE ONE-HALF TABLET BY MOUTH DAILY, Disp: 45 tablet, Rfl: 1  •  hydrOXYzine (VISTARIL) 25 MG capsule, 1-2 tabs q6h prn, Disp: 40 capsule, Rfl: 1    Aspirin use counseling: Taking ASA appropriately as indicated    Current medication list contains no high risk medications.  No harmful drug interactions have been identified.     History reviewed. No pertinent family history.    Social History     Tobacco Use   • Smoking status: Never Smoker   • Smokeless tobacco: Never Used   Substance Use Topics   • Alcohol use: No       Past Surgical History:   Procedure Laterality Date   • SUBTOTAL HYSTERECTOMY         Patient Active Problem List   Diagnosis   • Atypical chest pain   • Benign essential HTN   • Bronchitis   • Atherosclerosis of coronary artery   • Carpal tunnel syndrome   • Fatigue   • Arthralgia of hip   • HLD (hyperlipidemia)   • Amyotrophia   • Adult hypothyroidism   • Memory loss   • Pharyngeal inflammation   • Basal cell papilloma   • Forgetfulness   • Chronic kidney disease, stage III (moderate) (CMS/HCC)   • Night sweats   • Hot flashes   • Urine frequency   • Gastroesophageal reflux disease with esophagitis   • Dyslipidemia   • Chronic gout   • Late onset  "Alzheimer's disease without behavioral disturbance (CMS/[facility])       Review of Systems   Constitutional: Negative for fatigue and fever.   HENT: Positive for congestion. Negative for trouble swallowing.    Eyes: Negative for discharge and visual disturbance.   Respiratory: Negative for choking and shortness of breath.    Cardiovascular: Negative for chest pain and palpitations.   Gastrointestinal: Negative for abdominal pain and blood in stool.   Endocrine: Negative.    Genitourinary: Negative for genital sores and hematuria.   Musculoskeletal: Negative for gait problem and joint swelling.   Skin: Negative for color change, pallor, rash and wound.   Allergic/Immunologic: Positive for environmental allergies. Negative for immunocompromised state.   Neurological: Negative for facial asymmetry and speech difficulty.   Psychiatric/Behavioral: Positive for confusion and decreased concentration. Negative for hallucinations and suicidal ideas.       Objective     Vitals:    06/18/20 1333   BP: 140/58   Pulse: 65   Temp: 98 °F (36.7 °C)   SpO2: 97%   Weight: 48.7 kg (107 lb 6.4 oz)   Height: 152.4 cm (60\")       Patient's Body mass index is 20.98 kg/m². BMI is within normal parameters. No follow-up required..      No exam data present    The patient has potential evidence of severe cognitive impairment. 0/3 items were correctly remembered at 5 minutes.     Physical Exam   Constitutional: She is oriented to person, place, and time. She appears well-developed and well-nourished.   HENT:   Head: Normocephalic and atraumatic.   Right Ear: External ear normal.   Left Ear: External ear normal.   Nose: Nose normal.   Mouth/Throat: Oropharynx is clear and moist.   Eyes: Pupils are equal, round, and reactive to light. Conjunctivae and EOM are normal.   Neck: Normal range of motion. Neck supple.   Cardiovascular: Normal rate, regular rhythm, normal heart sounds and intact distal pulses. Exam reveals no gallop and no friction rub.   No " murmur heard.  Pulmonary/Chest: Effort normal and breath sounds normal. No stridor. She has no wheezes. She has no rales. She exhibits no tenderness.   Abdominal: Soft. Bowel sounds are normal.   Musculoskeletal: Normal range of motion.   Neurological: She is alert and oriented to person, place, and time.   Skin: Skin is warm and dry.   Psychiatric: Her behavior is normal. Judgment and thought content normal.   Moderate Alzheimer's       Recent Lab Results:  Lab Results   Component Value Date     (H) 08/18/2016     Lab Results   Component Value Date    CHOL 144 10/11/2019    TRIG 125 10/11/2019    HDL 55 10/11/2019    VLDL 25 10/11/2019    LDLHDL 1.16 10/11/2019       Assessment/Plan #1 continue to monitor blood pressure #2 labs #3 continue present treatment  Age-appropriate Screening Schedule:  Refer to the list below for future screening recommendations based on patient's age, sex and/or medical conditions.      Health Maintenance   Topic Date Due   • TDAP/TD VACCINES (1 - Tdap) 01/04/1943   • MAMMOGRAM  06/09/2019   • INFLUENZA VACCINE  08/01/2020   • LIPID PANEL  10/11/2020   • ZOSTER VACCINE  Addressed       Medicare Risks and Personalized Health Plan:  Advance Directive Discussion      CMS-Preventive Services Quick Reference  Medicare Preventive Services Addressed:  NA    Advance Care Planning:  ACP discussion was held with the patient during this visit. Patient has an advance directive in EMR which is still valid.     Diagnoses and all orders for this visit:    1. Medicare annual wellness visit, subsequent (Primary)    2. Benign essential HTN  -     CBC (No Diff); Future  -     Comprehensive Metabolic Panel  -     Lipid Panel  -     Vitamin D 25 Hydroxy    3. Gastroesophageal reflux disease with esophagitis  -     CBC (No Diff); Future  -     Comprehensive Metabolic Panel  -     Lipid Panel  -     Vitamin D 25 Hydroxy    4. Adult hypothyroidism  -     CBC (No Diff); Future  -     Comprehensive  Metabolic Panel  -     Lipid Panel  -     Vitamin D 25 Hydroxy  -     Uric Acid  -     Thyroid Panel With TSH    5. Chronic gout without tophus, unspecified cause, unspecified site  -     CBC (No Diff); Future  -     Comprehensive Metabolic Panel  -     Lipid Panel  -     Vitamin D 25 Hydroxy  -     Uric Acid  -     Thyroid Panel With TSH    6. Vitamin D deficiency, unspecified   -     Vitamin D 25 Hydroxy    7. Late onset Alzheimer's disease without behavioral disturbance (CMS/HCC)        An After Visit Summary and PPPS with all of these plans were given to the patient.      Follow Up:  Return in about 4 months (around 10/18/2020).

## 2020-06-26 ENCOUNTER — LAB (OUTPATIENT)
Dept: FAMILY MEDICINE CLINIC | Facility: CLINIC | Age: 85
End: 2020-06-26

## 2020-06-26 DIAGNOSIS — M1A.9XX0 CHRONIC GOUT WITHOUT TOPHUS, UNSPECIFIED CAUSE, UNSPECIFIED SITE: ICD-10-CM

## 2020-06-26 DIAGNOSIS — K21.00 GASTROESOPHAGEAL REFLUX DISEASE WITH ESOPHAGITIS: ICD-10-CM

## 2020-06-26 DIAGNOSIS — E03.9 ADULT HYPOTHYROIDISM: ICD-10-CM

## 2020-06-26 DIAGNOSIS — I10 BENIGN ESSENTIAL HTN: ICD-10-CM

## 2020-06-26 LAB
25(OH)D3 SERPL-MCNC: 40 NG/ML (ref 30–100)
ALBUMIN SERPL-MCNC: 4.2 G/DL (ref 3.5–5.2)
ALBUMIN/GLOB SERPL: 1.4 G/DL
ALP SERPL-CCNC: 76 U/L (ref 39–117)
ALT SERPL W P-5'-P-CCNC: 8 U/L (ref 1–33)
ANION GAP SERPL CALCULATED.3IONS-SCNC: 8.8 MMOL/L (ref 5–15)
AST SERPL-CCNC: 19 U/L (ref 1–32)
BILIRUB SERPL-MCNC: 0.3 MG/DL (ref 0.2–1.2)
BUN BLD-MCNC: 31 MG/DL (ref 8–23)
BUN/CREAT SERPL: 16.8 (ref 7–25)
CALCIUM SPEC-SCNC: 9.3 MG/DL (ref 8.6–10.5)
CHLORIDE SERPL-SCNC: 101 MMOL/L (ref 98–107)
CHOLEST SERPL-MCNC: 145 MG/DL (ref 0–200)
CO2 SERPL-SCNC: 25.2 MMOL/L (ref 22–29)
CREAT BLD-MCNC: 1.84 MG/DL (ref 0.57–1)
DEPRECATED RDW RBC AUTO: 44.9 FL (ref 37–54)
ERYTHROCYTE [DISTWIDTH] IN BLOOD BY AUTOMATED COUNT: 12.6 % (ref 12.3–15.4)
GFR SERPL CREATININE-BSD FRML MDRD: 26 ML/MIN/1.73
GLOBULIN UR ELPH-MCNC: 2.9 GM/DL
GLUCOSE BLD-MCNC: 100 MG/DL (ref 65–99)
HCT VFR BLD AUTO: 36.6 % (ref 34–46.6)
HDLC SERPL-MCNC: 60 MG/DL (ref 40–60)
HGB BLD-MCNC: 12.1 G/DL (ref 12–15.9)
LDLC SERPL CALC-MCNC: 71 MG/DL (ref 0–100)
LDLC/HDLC SERPL: 1.18 {RATIO}
MCH RBC QN AUTO: 31.8 PG (ref 26.6–33)
MCHC RBC AUTO-ENTMCNC: 33.1 G/DL (ref 31.5–35.7)
MCV RBC AUTO: 96.3 FL (ref 79–97)
PLATELET # BLD AUTO: 178 10*3/MM3 (ref 140–450)
PMV BLD AUTO: 9.6 FL (ref 6–12)
POTASSIUM BLD-SCNC: 4.4 MMOL/L (ref 3.5–5.2)
PROT SERPL-MCNC: 7.1 G/DL (ref 6–8.5)
RBC # BLD AUTO: 3.8 10*6/MM3 (ref 3.77–5.28)
SODIUM BLD-SCNC: 135 MMOL/L (ref 136–145)
T-UPTAKE NFR SERPL: 1.01 TBI (ref 0.8–1.3)
T4 SERPL-MCNC: 7.75 MCG/DL (ref 4.5–11.7)
TRIGL SERPL-MCNC: 72 MG/DL (ref 0–150)
TSH SERPL DL<=0.05 MIU/L-ACNC: 1.97 UIU/ML (ref 0.27–4.2)
URATE SERPL-MCNC: 5.4 MG/DL (ref 2.4–5.7)
VLDLC SERPL-MCNC: 14.4 MG/DL (ref 5–40)
WBC NRBC COR # BLD: 6.31 10*3/MM3 (ref 3.4–10.8)

## 2020-06-26 PROCEDURE — 84479 ASSAY OF THYROID (T3 OR T4): CPT | Performed by: INTERNAL MEDICINE

## 2020-06-26 PROCEDURE — 82306 VITAMIN D 25 HYDROXY: CPT | Performed by: INTERNAL MEDICINE

## 2020-06-26 PROCEDURE — 80061 LIPID PANEL: CPT | Performed by: INTERNAL MEDICINE

## 2020-06-26 PROCEDURE — 84436 ASSAY OF TOTAL THYROXINE: CPT | Performed by: INTERNAL MEDICINE

## 2020-06-26 PROCEDURE — 80053 COMPREHEN METABOLIC PANEL: CPT | Performed by: INTERNAL MEDICINE

## 2020-06-26 PROCEDURE — 85027 COMPLETE CBC AUTOMATED: CPT | Performed by: INTERNAL MEDICINE

## 2020-06-26 PROCEDURE — 84550 ASSAY OF BLOOD/URIC ACID: CPT | Performed by: INTERNAL MEDICINE

## 2020-06-26 PROCEDURE — 36415 COLL VENOUS BLD VENIPUNCTURE: CPT | Performed by: INTERNAL MEDICINE

## 2020-06-26 PROCEDURE — 84443 ASSAY THYROID STIM HORMONE: CPT | Performed by: INTERNAL MEDICINE

## 2020-08-31 RX ORDER — LEVOTHYROXINE SODIUM 125 MCG
TABLET ORAL
Qty: 45 TABLET | Refills: 0 | Status: SHIPPED | OUTPATIENT
Start: 2020-08-31 | End: 2020-12-07

## 2020-09-21 RX ORDER — ALLOPURINOL 100 MG/1
TABLET ORAL
Qty: 90 TABLET | Refills: 0 | Status: SHIPPED | OUTPATIENT
Start: 2020-09-21 | End: 2020-12-28

## 2020-12-07 RX ORDER — LEVOTHYROXINE SODIUM 125 MCG
TABLET ORAL
Qty: 45 TABLET | Refills: 3 | Status: SHIPPED | OUTPATIENT
Start: 2020-12-07 | End: 2021-01-01 | Stop reason: DRUGHIGH

## 2020-12-15 ENCOUNTER — OFFICE VISIT (OUTPATIENT)
Dept: CARDIOLOGY | Facility: CLINIC | Age: 85
End: 2020-12-15

## 2020-12-15 ENCOUNTER — HOSPITAL ENCOUNTER (OUTPATIENT)
Dept: CARDIOLOGY | Facility: HOSPITAL | Age: 85
Discharge: HOME | End: 2020-12-15
Admitting: INTERNAL MEDICINE

## 2020-12-15 DIAGNOSIS — I10 ESSENTIAL HYPERTENSION: Primary | ICD-10-CM

## 2020-12-15 DIAGNOSIS — I10 ESSENTIAL HYPERTENSION: ICD-10-CM

## 2020-12-15 DIAGNOSIS — E78.2 MIXED HYPERLIPIDEMIA: ICD-10-CM

## 2020-12-15 DIAGNOSIS — I10 BENIGN ESSENTIAL HTN: ICD-10-CM

## 2020-12-15 DIAGNOSIS — I25.10 CORONARY ARTERY DISEASE INVOLVING NATIVE CORONARY ARTERY OF NATIVE HEART WITHOUT ANGINA PECTORIS: ICD-10-CM

## 2020-12-15 LAB
ANION GAP SERPL CALCULATED.3IONS-SCNC: 9.5 MMOL/L (ref 5–15)
BUN SERPL-MCNC: 31 MG/DL (ref 8–23)
BUN/CREAT SERPL: 17.1 (ref 7–25)
CALCIUM SPEC-SCNC: 9 MG/DL (ref 8.6–10.5)
CHLORIDE SERPL-SCNC: 103 MMOL/L (ref 98–107)
CO2 SERPL-SCNC: 26.5 MMOL/L (ref 22–29)
CREAT SERPL-MCNC: 1.81 MG/DL (ref 0.57–1)
GFR SERPL CREATININE-BSD FRML MDRD: 26 ML/MIN/1.73
GLUCOSE SERPL-MCNC: 97 MG/DL (ref 65–99)
POTASSIUM SERPL-SCNC: 4.4 MMOL/L (ref 3.5–5.2)
SODIUM SERPL-SCNC: 139 MMOL/L (ref 136–145)

## 2020-12-15 PROCEDURE — 80048 BASIC METABOLIC PNL TOTAL CA: CPT | Performed by: INTERNAL MEDICINE

## 2020-12-15 PROCEDURE — 36415 COLL VENOUS BLD VENIPUNCTURE: CPT | Performed by: NURSE PRACTITIONER

## 2020-12-15 PROCEDURE — 93000 ELECTROCARDIOGRAM COMPLETE: CPT | Performed by: INTERNAL MEDICINE

## 2020-12-15 PROCEDURE — 99204 OFFICE O/P NEW MOD 45 MIN: CPT | Performed by: INTERNAL MEDICINE

## 2020-12-15 NOTE — PROGRESS NOTES
Please notify patient that her BMP including kidney function is stable so continue current blood pressure medications.  Return to clinic in 6 months.

## 2020-12-15 NOTE — PROGRESS NOTES
PATIENTINFORMATION    Date of Office Visit: 12/15/2020  Encounter Provider: Erick Hernandez MD  Place of Service: [facility] CARDIOLOGY  Patient Name: Christine Wharton  : 1932    Subjective:     Encounter Date:12/15/2020      Patient ID: Christine Wharton is a 88 y.o. female.    Chief Complaint   Patient presents with   • Coronary Artery Disease     NEW PATIENT    • Hyperlipidemia   • Chronic Kidney Disease   • Hypertension        and Ms. Wharton came together to clinic to establish care    HPI  Ms. Wharton is an 88 years old female patient was to follow-up with Theodora and past medical history significant for advanced dementia, significant coronary artery disease of an dominant RCA and  small obtuse marginal branch per coronary angiogram done in the past, bilateral carotid artery disease(left less than 70%), hypertension, hyperlipidemia, hypothyroid, stage IV CKD came to cardiology clinic to establish care.  Both her  Mr. Ngo and her son were helping with his history and she had advanced dementia.  She gets out to walk with her  from time to time and she walks around a lot inside her house and denied any significant chest pain, shortness of breath, orthopnea, PND, presyncope or syncope.  She is currently on nifedipine extended release 30 mg twice a day, that seems to have helped blood pressure better compared to once daily dosing per family, Coreg 25 mg p.o. twice daily and low-dose statin.  Blood pressure has been running within range recently.   No significant new concerns today.    She had a normal vasodilator SPECT back in 2016.    ROS   All systems reviewed and negative except as noted in HPI.    Past Medical History:   Diagnosis Date   • Chronic kidney disease     Stage 3 (moderate)   • Coronary atherosclerosis     CAD   • Hypercholesteremia    • Hypertension        Past Surgical History:   Procedure Laterality Date   • SUBTOTAL HYSTERECTOMY         Social History      Socioeconomic History   • Marital status:      Spouse name: Not on file   • Number of children: Not on file   • Years of education: Not on file   • Highest education level: Not on file   Tobacco Use   • Smoking status: Never Smoker   • Smokeless tobacco: Never Used   Substance and Sexual Activity   • Alcohol use: No       History reviewed. No pertinent family history.        ECG 12 Lead    Date/Time: 12/15/2020 2:04 PM  Performed by: Erick Hernandez MD  Authorized by: Erick Hernandez MD   Comparison: compared with previous ECG from 2/5/2017  Rhythm: sinus bradycardia  Rate: normal  Conduction: left anterior fascicular block  ST Segments: ST segments normal  T Waves: T waves normal  QRS axis: normal  Other: no other findings    Clinical impression: abnormal EKG               Objective:     There were no vitals taken for this visit. There is no height or weight on file to calculate BMI.     Constitutional:       General: Not in acute distress.     Appearance: Well-developed. Not diaphoretic.   Eyes:      Pupils: Pupils are equal, round, and reactive to light.   HENT:      Head: Normocephalic and atraumatic.   Neck:      Musculoskeletal: Normal range of motion and neck supple.      Thyroid: No thyromegaly.   Pulmonary:      Effort: Pulmonary effort is normal. No respiratory distress.      Breath sounds: Normal breath sounds. No wheezing. No rales.   Chest:      Chest wall: Not tender to palpatation.   Cardiovascular:      Normal rate. Regular rhythm.      No gallop.   Pulses:     Intact distal pulses.   Edema:     Peripheral edema absent.   Abdominal:      General: Bowel sounds are normal. There is no distension.      Palpations: Abdomen is soft.      Tenderness: There is no guarding.   Musculoskeletal: Normal range of motion.         General: No deformity.   Skin:     General: Skin is warm and dry.      Findings: No rash.   Neurological:      Mental Status: Alert and oriented to person, place,  and time.      Cranial Nerves: No cranial nerve deficit.      Deep Tendon Reflexes: Reflexes are normal and symmetric.   Psychiatric:         Judgment: Judgment normal.         Review Of Data:       Assessment/Plan:           Coronary artery disease involving native coronary artery of native heart without angina pectoris    Benign essential HTN    Mixed hyperlipidemia     Advanced dementia    Patient is asymptomatic with regards to coronary artery disease.  Continue statin, beta-blocker.  Blood pressure is very well controlled nifedipine and Coreg that I would continue.  Noted to sinus bradycardia without any significant new recent complaints or changes.  Check BMP today    Otherwise return to clinic in 6 months.    Diagnosis and plan of care discussed with patient and verbalized understanding.           Erick Hernandez MD  12/15/20  15:03 EST

## 2020-12-21 RX ORDER — NIFEDIPINE 30 MG/1
30 TABLET, EXTENDED RELEASE ORAL DAILY
Qty: 90 TABLET | Refills: 3 | Status: SHIPPED | OUTPATIENT
Start: 2020-12-21 | End: 2021-01-01 | Stop reason: SDUPTHER

## 2020-12-21 RX ORDER — NIFEDIPINE 30 MG/1
30 TABLET, EXTENDED RELEASE ORAL EVERY 12 HOURS
Status: CANCELLED | OUTPATIENT
Start: 2020-12-21

## 2020-12-28 RX ORDER — ALLOPURINOL 100 MG/1
TABLET ORAL
Qty: 90 TABLET | Refills: 1 | Status: SHIPPED | OUTPATIENT
Start: 2020-12-28 | End: 2021-01-01

## 2021-01-01 ENCOUNTER — TELEPHONE (OUTPATIENT)
Dept: FAMILY MEDICINE CLINIC | Facility: CLINIC | Age: 86
End: 2021-01-01

## 2021-01-01 ENCOUNTER — OFFICE VISIT (OUTPATIENT)
Dept: FAMILY MEDICINE CLINIC | Facility: CLINIC | Age: 86
End: 2021-01-01

## 2021-01-01 ENCOUNTER — LAB (OUTPATIENT)
Dept: FAMILY MEDICINE CLINIC | Facility: CLINIC | Age: 86
End: 2021-01-01

## 2021-01-01 VITALS
DIASTOLIC BLOOD PRESSURE: 60 MMHG | TEMPERATURE: 98 F | BODY MASS INDEX: 21.99 KG/M2 | RESPIRATION RATE: 15 BRPM | SYSTOLIC BLOOD PRESSURE: 130 MMHG | WEIGHT: 112 LBS | OXYGEN SATURATION: 98 % | HEIGHT: 60 IN | HEART RATE: 58 BPM

## 2021-01-01 DIAGNOSIS — M1A.9XX0 CHRONIC GOUT WITHOUT TOPHUS, UNSPECIFIED CAUSE, UNSPECIFIED SITE: ICD-10-CM

## 2021-01-01 DIAGNOSIS — K21.00 GASTROESOPHAGEAL REFLUX DISEASE WITH ESOPHAGITIS WITHOUT HEMORRHAGE: ICD-10-CM

## 2021-01-01 DIAGNOSIS — Z00.00 MEDICARE ANNUAL WELLNESS VISIT, SUBSEQUENT: Primary | ICD-10-CM

## 2021-01-01 DIAGNOSIS — E03.9 ADULT HYPOTHYROIDISM: Primary | ICD-10-CM

## 2021-01-01 DIAGNOSIS — F02.80 LATE ONSET ALZHEIMER'S DISEASE WITHOUT BEHAVIORAL DISTURBANCE (HCC): ICD-10-CM

## 2021-01-01 DIAGNOSIS — G30.1 LATE ONSET ALZHEIMER'S DISEASE WITHOUT BEHAVIORAL DISTURBANCE (HCC): ICD-10-CM

## 2021-01-01 DIAGNOSIS — E55.9 VITAMIN D DEFICIENCY, UNSPECIFIED: Primary | ICD-10-CM

## 2021-01-01 DIAGNOSIS — E03.9 ADULT HYPOTHYROIDISM: ICD-10-CM

## 2021-01-01 LAB
25(OH)D3 SERPL-MCNC: 29.4 NG/ML (ref 30–100)
ALBUMIN SERPL-MCNC: 3.9 G/DL (ref 3.5–5.2)
ALBUMIN/GLOB SERPL: 1.4 G/DL
ALP SERPL-CCNC: 72 U/L (ref 39–117)
ALT SERPL W P-5'-P-CCNC: 8 U/L (ref 1–33)
ANION GAP SERPL CALCULATED.3IONS-SCNC: 11.7 MMOL/L (ref 5–15)
AST SERPL-CCNC: 14 U/L (ref 1–32)
BILIRUB SERPL-MCNC: 0.4 MG/DL (ref 0–1.2)
BUN SERPL-MCNC: 29 MG/DL (ref 8–23)
BUN/CREAT SERPL: 16.3 (ref 7–25)
CALCIUM SPEC-SCNC: 9.3 MG/DL (ref 8.6–10.5)
CHLORIDE SERPL-SCNC: 106 MMOL/L (ref 98–107)
CO2 SERPL-SCNC: 25.3 MMOL/L (ref 22–29)
CREAT SERPL-MCNC: 1.78 MG/DL (ref 0.57–1)
DEPRECATED RDW RBC AUTO: 46.2 FL (ref 37–54)
ERYTHROCYTE [DISTWIDTH] IN BLOOD BY AUTOMATED COUNT: 13 % (ref 12.3–15.4)
GFR SERPL CREATININE-BSD FRML MDRD: 27 ML/MIN/1.73
GLOBULIN UR ELPH-MCNC: 2.7 GM/DL
GLUCOSE SERPL-MCNC: 108 MG/DL (ref 65–99)
HCT VFR BLD AUTO: 37.7 % (ref 34–46.6)
HGB BLD-MCNC: 12 G/DL (ref 12–15.9)
MCH RBC QN AUTO: 30.7 PG (ref 26.6–33)
MCHC RBC AUTO-ENTMCNC: 31.8 G/DL (ref 31.5–35.7)
MCV RBC AUTO: 96.4 FL (ref 79–97)
PLATELET # BLD AUTO: 181 10*3/MM3 (ref 140–450)
PMV BLD AUTO: 9.7 FL (ref 6–12)
POTASSIUM SERPL-SCNC: 4.4 MMOL/L (ref 3.5–5.2)
PROT SERPL-MCNC: 6.6 G/DL (ref 6–8.5)
RBC # BLD AUTO: 3.91 10*6/MM3 (ref 3.77–5.28)
SODIUM SERPL-SCNC: 143 MMOL/L (ref 136–145)
T-UPTAKE NFR SERPL: 0.99 TBI (ref 0.8–1.3)
T4 SERPL-MCNC: 6.32 MCG/DL (ref 4.5–11.7)
TSH SERPL DL<=0.05 MIU/L-ACNC: 5.73 UIU/ML (ref 0.27–4.2)
URATE SERPL-MCNC: 5.4 MG/DL (ref 2.4–5.7)
WBC # BLD AUTO: 6.29 10*3/MM3 (ref 3.4–10.8)

## 2021-01-01 PROCEDURE — G0439 PPPS, SUBSEQ VISIT: HCPCS | Performed by: INTERNAL MEDICINE

## 2021-01-01 PROCEDURE — 96160 PT-FOCUSED HLTH RISK ASSMT: CPT | Performed by: INTERNAL MEDICINE

## 2021-01-01 PROCEDURE — 84550 ASSAY OF BLOOD/URIC ACID: CPT | Performed by: INTERNAL MEDICINE

## 2021-01-01 PROCEDURE — 84443 ASSAY THYROID STIM HORMONE: CPT | Performed by: INTERNAL MEDICINE

## 2021-01-01 PROCEDURE — 1126F AMNT PAIN NOTED NONE PRSNT: CPT | Performed by: INTERNAL MEDICINE

## 2021-01-01 PROCEDURE — 1159F MED LIST DOCD IN RCRD: CPT | Performed by: INTERNAL MEDICINE

## 2021-01-01 PROCEDURE — 80053 COMPREHEN METABOLIC PANEL: CPT | Performed by: INTERNAL MEDICINE

## 2021-01-01 PROCEDURE — 85027 COMPLETE CBC AUTOMATED: CPT | Performed by: INTERNAL MEDICINE

## 2021-01-01 PROCEDURE — 99214 OFFICE O/P EST MOD 30 MIN: CPT | Performed by: INTERNAL MEDICINE

## 2021-01-01 PROCEDURE — 82306 VITAMIN D 25 HYDROXY: CPT | Performed by: INTERNAL MEDICINE

## 2021-01-01 PROCEDURE — 1170F FXNL STATUS ASSESSED: CPT | Performed by: INTERNAL MEDICINE

## 2021-01-01 PROCEDURE — 84436 ASSAY OF TOTAL THYROXINE: CPT | Performed by: INTERNAL MEDICINE

## 2021-01-01 PROCEDURE — 36415 COLL VENOUS BLD VENIPUNCTURE: CPT | Performed by: INTERNAL MEDICINE

## 2021-01-01 PROCEDURE — 84479 ASSAY OF THYROID (T3 OR T4): CPT | Performed by: INTERNAL MEDICINE

## 2021-01-01 RX ORDER — LEVOTHYROXINE SODIUM 0.12 MG/1
125 TABLET ORAL DAILY
Qty: 90 TABLET | Refills: 1 | Status: SHIPPED | OUTPATIENT
Start: 2021-01-01 | End: 2022-01-01

## 2021-01-01 RX ORDER — LEVOTHYROXINE SODIUM 137 UG/1
137 TABLET ORAL DAILY
Qty: 30 TABLET | Refills: 4 | Status: SHIPPED | OUTPATIENT
Start: 2021-01-01 | End: 2021-01-01 | Stop reason: DRUGHIGH

## 2021-01-01 RX ORDER — ALLOPURINOL 100 MG/1
TABLET ORAL
Qty: 90 TABLET | Refills: 1 | Status: SHIPPED | OUTPATIENT
Start: 2021-01-01 | End: 2022-01-01

## 2021-01-01 RX ORDER — DONEPEZIL HYDROCHLORIDE 10 MG/1
10 TABLET, FILM COATED ORAL NIGHTLY
Qty: 30 TABLET | Refills: 3 | Status: SHIPPED | OUTPATIENT
Start: 2021-01-01 | End: 2021-01-01

## 2021-01-01 RX ORDER — DONEPEZIL HYDROCHLORIDE 10 MG/1
TABLET, FILM COATED ORAL
Qty: 30 TABLET | Refills: 3 | Status: SHIPPED | OUTPATIENT
Start: 2021-01-01 | End: 2022-01-01

## 2021-01-01 RX ORDER — NIFEDIPINE 30 MG/1
30 TABLET, EXTENDED RELEASE ORAL DAILY
Qty: 30 TABLET | Refills: 0 | Status: SHIPPED | OUTPATIENT
Start: 2021-01-01 | End: 2022-01-01 | Stop reason: SDUPTHER

## 2021-01-11 RX ORDER — DONEPEZIL HYDROCHLORIDE 5 MG/1
TABLET, FILM COATED ORAL
Qty: 90 TABLET | Refills: 1 | Status: SHIPPED | OUTPATIENT
Start: 2021-01-11 | End: 2021-01-01 | Stop reason: DRUGHIGH

## 2021-03-09 DIAGNOSIS — Z23 IMMUNIZATION DUE: ICD-10-CM

## 2021-06-07 ENCOUNTER — TELEPHONE (OUTPATIENT)
Dept: CARDIOLOGY | Facility: CLINIC | Age: 86
End: 2021-06-07

## 2021-07-13 NOTE — PROGRESS NOTES
QUICK REFERENCE INFORMATION:  The ABCs of the Annual Wellness Visit    Subsequent Medicare Wellness Visit patient was seen for a Medicare wellness exam.  Patient has late stage Alzheimer's.  Patient is completely disoriented.  Patient is totally reliant on her  for care.  Patient donepezil increased to 10 mg daily.  Patient will follow up in 1 month and if stable will add Zoloft 50 mg daily.  Patient's  thinks she is little depressed.  Patient's gastroesophageal reflux has been treated with over-the-counter medication.  Patient's gout treated with allopurinol 100 mg daily.  Patient will have labs today and results are pending at the time of dictation.  Patient will follow up in 1 month.    Dictated utilizing Dragon dictation. If there are questions or for further clarification, please contact me.    HEALTH RISK ASSESSMENT    1/4/1932    Recent Hospitalizations:  No hospitalization(s) within the last year..        Current Medical Providers:  Patient Care Team:  Louis Valdez MD as PCP - General (Internal Medicine)  Sonu Yip Jr., MD as Consulting Physician (Cardiology)  Louis Lee MD as Consulting Physician (Nephrology)        Smoking Status:  Social History     Tobacco Use   Smoking Status Never Smoker   Smokeless Tobacco Never Used       Alcohol Consumption:  Social History     Substance and Sexual Activity   Alcohol Use No       Depression Screen:   PHQ-2/PHQ-9 Depression Screening 7/13/2021   Little interest or pleasure in doing things 0   Feeling down, depressed, or hopeless 1   Trouble falling or staying asleep, or sleeping too much 1   Feeling tired or having little energy 1   Poor appetite or overeating 1   Feeling bad about yourself - or that you are a failure or have let yourself or your family down 0   Trouble concentrating on things, such as reading the newspaper or watching television 0   Moving or speaking so slowly that other people could have noticed. Or the opposite -  being so fidgety or restless that you have been moving around a lot more than usual 0   Thoughts that you would be better off dead, or of hurting yourself in some way 0   Total Score 4   If you checked off any problems, how difficult have these problems made it for you to do your work, take care of things at home, or get along with other people? Not difficult at all       Health Habits and Functional and Cognitive Screening:  Functional & Cognitive Status 7/13/2021   Do you have difficulty preparing food and eating? Yes   Do you have difficulty bathing yourself, getting dressed or grooming yourself? Yes   Do you have difficulty using the toilet? No   Do you have difficulty moving around from place to place? No   Do you have trouble with steps or getting out of a bed or a chair? No   Current Diet Well Balanced Diet   Dental Exam Up to date   Eye Exam Up to date   Exercise (times per week) 4 times per week   Current Exercises Include Walking   Current Exercise Activities Include -   Do you need help using the phone?  Yes   Are you deaf or do you have serious difficulty hearing?  No   Do you need help with transportation? Yes   Do you need help shopping? Yes   Do you need help preparing meals?  Yes   Do you need help with housework?  Yes   Do you need help with laundry? Yes   Do you need help taking your medications? Yes   Do you need help managing money? Yes   Do you ever drive or ride in a car without wearing a seat belt? No   Have you felt unusual stress, anger or loneliness in the last month? Yes   Who do you live with? Spouse   If you need help, do you have trouble finding someone available to you? No   Have you been bothered in the last four weeks by sexual problems? No   Do you have difficulty concentrating, remembering or making decisions? Yes           Does the patient have evidence of cognitive impairment? yes    Aspirin use counseling: Taking ASA appropriately as indicated      Recent Lab Results:  CMP:  Lab  Results   Component Value Date     (H) 08/18/2016    BUN 31 (H) 12/15/2020    CREATININE 1.81 (H) 12/15/2020    EGFRIFNONA 26 (L) 12/15/2020    EGFRIFAFRI 41 (L) 08/18/2016    BCR 17.1 12/15/2020     12/15/2020    K 4.4 12/15/2020    CO2 26.5 12/15/2020    CALCIUM 9.0 12/15/2020    PROTENTOTREF 7.0 08/18/2016    ALBUMIN 4.20 06/26/2020    LABGLOBREF 2.4 08/18/2016    LABIL2 1.9 08/18/2016    BILITOT 0.3 06/26/2020    ALKPHOS 76 06/26/2020    AST 19 06/26/2020    ALT 8 06/26/2020     Lipid Panel:  Lab Results   Component Value Date    CHOL 145 06/26/2020    TRIG 72 06/26/2020    HDL 60 06/26/2020    VLDL 14.4 06/26/2020    LDLHDL 1.18 06/26/2020     HbA1c:       Visual Acuity:  No exam data present    Age-appropriate Screening Schedule:  Refer to the list below for future screening recommendations based on patient's age, sex and/or medical conditions. Orders for these recommended tests are listed in the plan section. The patient has been provided with a written plan.    Health Maintenance   Topic Date Due   • DXA SCAN  Never done   • TDAP/TD VACCINES (1 - Tdap) Never done   • MAMMOGRAM  06/09/2019   • LIPID PANEL  06/26/2021   • INFLUENZA VACCINE  08/01/2021   • ZOSTER VACCINE  Addressed        Subjective   History of Present Illness    Christine Wharton is a 89 y.o. female who presents for an Subsequent Wellness Visit.    The following portions of the patient's history were reviewed and updated as appropriate: allergies, current medications, past family history, past medical history, past social history, past surgical history and problem list.    Outpatient Medications Prior to Visit   Medication Sig Dispense Refill   • allopurinol (ZYLOPRIM) 100 MG tablet TAKE ONE TABLET BY MOUTH DAILY 90 tablet 1   • aspirin 81 MG tablet Take 1 tablet by mouth daily.     • carvedilol (COREG) 25 MG tablet TAKE ONE TABLET BY MOUTH TWICE A  tablet 0   • NIFEdipine XL (Procardia XL) 30 MG 24 hr tablet Take 1 tablet by  mouth Daily. 90 tablet 3   • rosuvastatin (CRESTOR) 5 MG tablet Take 0.5 tablets by mouth Daily. 15 tablet 11   • Synthroid 125 MCG tablet TAKE 1/2 TABLET BY MOUTH DAILY 45 tablet 3   • donepezil (ARICEPT) 5 MG tablet TAKE ONE TABLET BY MOUTH ONCE NIGHTLY 90 tablet 1     No facility-administered medications prior to visit.       Patient Active Problem List   Diagnosis   • Atypical chest pain   • Benign essential HTN   • Bronchitis   • Coronary artery disease involving native coronary artery of native heart without angina pectoris   • Carpal tunnel syndrome   • Fatigue   • Arthralgia of hip   • HLD (hyperlipidemia)   • Amyotrophia   • Adult hypothyroidism   • Memory loss   • Pharyngeal inflammation   • Basal cell papilloma   • Forgetfulness   • Chronic kidney disease, stage III (moderate) (CMS/HCC)   • Night sweats   • Hot flashes   • Urine frequency   • Gastroesophageal reflux disease with esophagitis   • Dyslipidemia   • Chronic gout   • Late onset Alzheimer's disease without behavioral disturbance (CMS/HCC)       Advance Care Planning:  ACP discussion was held with the patient during this visit. Patient does not have an advance directive, information provided.    Identification of Risk Factors:  Risk factors include: NA.    Review of Systems   Constitutional: Negative for fatigue and fever.   HENT: Positive for congestion. Negative for trouble swallowing.    Eyes: Negative for discharge and visual disturbance.   Respiratory: Negative for choking and shortness of breath.    Cardiovascular: Negative for chest pain and palpitations.   Gastrointestinal: Negative for abdominal pain and blood in stool.   Endocrine: Negative.    Genitourinary: Negative for genital sores and hematuria.   Musculoskeletal: Negative for gait problem and joint swelling.   Skin: Negative for color change, pallor, rash and wound.   Allergic/Immunologic: Positive for environmental allergies. Negative for immunocompromised state.   Neurological:  "Negative for facial asymmetry and speech difficulty.   Psychiatric/Behavioral: Positive for confusion, decreased concentration and dysphoric mood. Negative for hallucinations and suicidal ideas.       Compared to one year ago, the patient feels her physical health is the same.  Compared to one year ago, the patient feels her mental health is worse.    Objective     Physical Exam  Vitals and nursing note reviewed.   Constitutional:       Appearance: Normal appearance. She is well-developed.   HENT:      Head: Normocephalic and atraumatic.      Nose: Nose normal.      Mouth/Throat:      Mouth: Mucous membranes are moist.      Pharynx: Oropharynx is clear.   Eyes:      Extraocular Movements: Extraocular movements intact.      Conjunctiva/sclera: Conjunctivae normal.      Pupils: Pupils are equal, round, and reactive to light.   Cardiovascular:      Rate and Rhythm: Normal rate and regular rhythm.      Heart sounds: Normal heart sounds.   Pulmonary:      Effort: Pulmonary effort is normal.      Breath sounds: Normal breath sounds.   Abdominal:      General: Bowel sounds are normal.      Palpations: Abdomen is soft.   Musculoskeletal:         General: Normal range of motion.      Cervical back: Normal range of motion and neck supple.   Skin:     General: Skin is warm and dry.   Neurological:      General: No focal deficit present.      Mental Status: She is alert. Mental status is at baseline.   Psychiatric:      Comments: Patient has moderate to severe is Alzheimer's         Vitals:    07/13/21 1507   BP: 130/60   BP Location: Left arm   Patient Position: Sitting   Cuff Size: Adult   Pulse: 58   Resp: 15   Temp: 98 °F (36.7 °C)   TempSrc: Infrared   SpO2: 98%   Weight: 50.8 kg (112 lb)   Height: 152.4 cm (60\")   PainSc: 0-No pain       Patient's Body mass index is 21.87 kg/m². indicating that she is within normal range (BMI 18.5-24.9). No BMI management plan needed..      Assessment/Plan #1 increase donepezil to 10 mg " daily #2 Zoloft 50 mg daily #3 labs #4 follow-up in 1 month  Patient Self-Management and Personalized Health Advice  The patient has been provided with information about: mental health concerns and preventive services including:   · NA.    Visit Diagnoses:    ICD-10-CM ICD-9-CM   1. Medicare annual wellness visit, subsequent  Z00.00 V70.0   2. Chronic gout without tophus, unspecified cause, unspecified site  M1A.9XX0 274.02   3. Gastroesophageal reflux disease with esophagitis without hemorrhage  K21.00 530.81     530.10   4. Late onset Alzheimer's disease without behavioral disturbance (CMS/[facility])  G30.1 331.0    F02.80 294.10       Orders Placed This Encounter   Procedures   • CBC (No Diff)     Standing Status:   Future     Standing Expiration Date:   7/13/2022     Order Specific Question:   Release to patient     Answer:   Immediate   • Comprehensive Metabolic Panel     Order Specific Question:   Release to patient     Answer:   Immediate   • Vitamin D 25 Hydroxy     Order Specific Question:   Release to patient     Answer:   Immediate   • Thyroid Panel With TSH     Order Specific Question:   Release to patient     Answer:   Immediate   • Uric Acid     Order Specific Question:   Release to patient     Answer:   Immediate       Outpatient Encounter Medications as of 7/13/2021   Medication Sig Dispense Refill   • allopurinol (ZYLOPRIM) 100 MG tablet TAKE ONE TABLET BY MOUTH DAILY 90 tablet 1   • aspirin 81 MG tablet Take 1 tablet by mouth daily.     • carvedilol (COREG) 25 MG tablet TAKE ONE TABLET BY MOUTH TWICE A  tablet 0   • NIFEdipine XL (Procardia XL) 30 MG 24 hr tablet Take 1 tablet by mouth Daily. 90 tablet 3   • rosuvastatin (CRESTOR) 5 MG tablet Take 0.5 tablets by mouth Daily. 15 tablet 11   • Synthroid 125 MCG tablet TAKE 1/2 TABLET BY MOUTH DAILY 45 tablet 3   • [DISCONTINUED] donepezil (ARICEPT) 5 MG tablet TAKE ONE TABLET BY MOUTH ONCE NIGHTLY 90 tablet 1   • donepezil (Aricept) 10 MG tablet  Take 1 tablet by mouth Every Night. 30 tablet 3   • sertraline (Zoloft) 50 MG tablet Take 1 tablet by mouth Daily. 30 tablet 3     No facility-administered encounter medications on file as of 7/13/2021.       Reviewed use of high risk medication in the elderly: not applicable  Reviewed for potential of harmful drug interactions in the elderly: not applicable    Follow Up:  Return in about 6 months (around 1/13/2022), or if symptoms worsen or fail to improve, for Recheck.     An After Visit Summary and PPPS with all of these plans were given to the patient.

## 2021-07-13 NOTE — PATIENT INSTRUCTIONS
Medicare Wellness  Personal Prevention Plan of Service     Date of Office Visit:  2021  Encounter Provider:  Louis Valdez MD  Place of Service:  [facility] PRIMARY CARE  Patient Name: Christine Wharton  :  1932    As part of the Medicare Wellness portion of your visit today, we are providing you with this personalized preventive plan of services (PPPS). This plan is based upon recommendations of the [place] Preventive Services Task Force (USPSTF) and the Advisory Committee on Immunization Practices (ACIP).    This lists the preventive care services that should be considered, and provides dates of when you are due. Items listed as completed are up-to-date and do not require any further intervention.    Health Maintenance   Topic Date Due   • DXA SCAN  Never done   • TDAP/TD VACCINES (1 - Tdap) Never done   • Pneumococcal Vaccine 65+ (1 of 1 - PPSV23) Never done   • MAMMOGRAM  2019   • LIPID PANEL  2021   • INFLUENZA VACCINE  2021   • ANNUAL WELLNESS VISIT  2022   • COVID-19 Vaccine  Completed   • ZOSTER VACCINE  Addressed       Orders Placed This Encounter   Procedures   • CBC (No Diff)     Standing Status:   Future     Standing Expiration Date:   2022     Order Specific Question:   Release to patient     Answer:   Immediate   • Comprehensive Metabolic Panel     Order Specific Question:   Release to patient     Answer:   Immediate   • Vitamin D 25 Hydroxy     Order Specific Question:   Release to patient     Answer:   Immediate   • Thyroid Panel With TSH     Order Specific Question:   Release to patient     Answer:   Immediate   • Uric Acid     Order Specific Question:   Release to patient     Answer:   Immediate       No follow-ups on file.

## 2021-07-19 NOTE — TELEPHONE ENCOUNTER
SEE BELOW    Caller: Cassie Ruffin    Relationship: Emergency Contact    Best call back number: [phone]   What medications are you currently taking:   Current Outpatient Medications on File Prior to Visit   Medication Sig Dispense Refill   • allopurinol (ZYLOPRIM) 100 MG tablet TAKE ONE TABLET BY MOUTH DAILY 90 tablet 1   • aspirin 81 MG tablet Take 1 tablet by mouth daily.     • carvedilol (COREG) 25 MG tablet TAKE ONE TABLET BY MOUTH TWICE A  tablet 0   • donepezil (Aricept) 10 MG tablet Take 1 tablet by mouth Every Night. 30 tablet 3   • levothyroxine (Synthroid) 137 MCG tablet Take 1 tablet by mouth Daily. 30 tablet 4   • NIFEdipine XL (Procardia XL) 30 MG 24 hr tablet Take 1 tablet by mouth Daily. 90 tablet 3   • rosuvastatin (CRESTOR) 5 MG tablet Take 0.5 tablets by mouth Daily. 15 tablet 11   • sertraline (Zoloft) 50 MG tablet Take 1 tablet by mouth Daily. 30 tablet 3     No current facility-administered medications on file prior to visit.          Which medication are you concerned about: LEVOTHYROXINE 137 MCG BY MOUTH DAILY    What are your concerns:  ON THE OLD PRESCRIPTION  MCG 1/2 TABLET DAILY.  THE NEW PRESCRIPTION IS 1 TABLET DAILY. PLEASE ADVISE

## 2021-07-19 NOTE — TELEPHONE ENCOUNTER
Caller: Cassie Wharton    Relationship: Emergency Contact    Best call back number: [phone]    What medications are you currently taking:   Current Outpatient Medications on File Prior to Visit   Medication Sig Dispense Refill   • allopurinol (ZYLOPRIM) 100 MG tablet TAKE ONE TABLET BY MOUTH DAILY 90 tablet 1   • aspirin 81 MG tablet Take 1 tablet by mouth daily.     • carvedilol (COREG) 25 MG tablet TAKE ONE TABLET BY MOUTH TWICE A  tablet 0   • donepezil (Aricept) 10 MG tablet Take 1 tablet by mouth Every Night. 30 tablet 3   • levothyroxine (Synthroid) 137 MCG tablet Take 1 tablet by mouth Daily. 30 tablet 4   • NIFEdipine XL (Procardia XL) 30 MG 24 hr tablet Take 1 tablet by mouth Daily. 90 tablet 3   • rosuvastatin (CRESTOR) 5 MG tablet Take 0.5 tablets by mouth Daily. 15 tablet 11   • sertraline (Zoloft) 50 MG tablet Take 1 tablet by mouth Daily. 30 tablet 3     No current facility-administered medications on file prior to visit.        When did you start taking these medications: 7/17/2021    Which medication are you concerned about:    sertraline (Zoloft) 50 MG tablet         Who prescribed you this medication: DR CLEVELAND    What are your concerns: PATIENT IS SLEEPING CONSTANTLY WHILE TAKING THIS.    How long have you been taking these medications: ABOUT THREE DAYS    How long have you had these concerns: A FEW DAYS

## 2021-07-20 NOTE — TELEPHONE ENCOUNTER
Recommend to start levothyroxine 125 mcg, recommend to take a whole tablet, recheck TSH in 6 weeks.

## 2021-12-20 NOTE — TELEPHONE ENCOUNTER
Patient overdue for office follow-up.  Please call patient and schedule appointment to see Dr. Hernandez or SHAYY within 1 month.

## 2021-12-20 NOTE — TELEPHONE ENCOUNTER
Protocol failed      Rx Refill Note  Requested Prescriptions     Pending Prescriptions Disp Refills   • NIFEdipine XL (Procardia XL) 30 MG 24 hr tablet 90 tablet 3     Sig: Take 1 tablet by mouth Daily.      Last office visit with prescribing clinician: 12/15/2020      Next office visit with prescribing clinician: Visit date not found            Alyssa Phillip MA  12/20/21, 15:03 EST

## 2022-01-01 ENCOUNTER — APPOINTMENT (OUTPATIENT)
Dept: CT IMAGING | Facility: HOSPITAL | Age: 87
End: 2022-01-01

## 2022-01-01 ENCOUNTER — TELEPHONE (OUTPATIENT)
Dept: FAMILY MEDICINE CLINIC | Facility: CLINIC | Age: 87
End: 2022-01-01

## 2022-01-01 ENCOUNTER — ANESTHESIA EVENT (OUTPATIENT)
Dept: PERIOP | Facility: HOSPITAL | Age: 87
End: 2022-01-01

## 2022-01-01 ENCOUNTER — APPOINTMENT (OUTPATIENT)
Dept: GENERAL RADIOLOGY | Facility: HOSPITAL | Age: 87
End: 2022-01-01

## 2022-01-01 ENCOUNTER — HOSPITAL ENCOUNTER (INPATIENT)
Facility: HOSPITAL | Age: 87
LOS: 10 days | Discharge: HOSPICE | End: 2022-05-23
Attending: EMERGENCY MEDICINE | Admitting: ORTHOPAEDIC SURGERY

## 2022-01-01 ENCOUNTER — ANESTHESIA (OUTPATIENT)
Dept: PERIOP | Facility: HOSPITAL | Age: 87
End: 2022-01-01

## 2022-01-01 ENCOUNTER — OFFICE VISIT (OUTPATIENT)
Dept: CARDIOLOGY | Facility: CLINIC | Age: 87
End: 2022-01-01

## 2022-01-01 ENCOUNTER — TELEPHONE (OUTPATIENT)
Dept: CARDIOLOGY | Facility: CLINIC | Age: 87
End: 2022-01-01

## 2022-01-01 ENCOUNTER — HOSPITAL ENCOUNTER (EMERGENCY)
Facility: HOSPITAL | Age: 87
Discharge: NURSING, REHAB OR LONG-TERM CARE | End: 2022-05-08
Attending: EMERGENCY MEDICINE | Admitting: EMERGENCY MEDICINE

## 2022-01-01 ENCOUNTER — HOSPITAL ENCOUNTER (INPATIENT)
Facility: HOSPITAL | Age: 87
LOS: 19 days | End: 2022-06-11
Attending: STUDENT IN AN ORGANIZED HEALTH CARE EDUCATION/TRAINING PROGRAM | Admitting: INTERNAL MEDICINE

## 2022-01-01 VITALS — TEMPERATURE: 86.3 F | SYSTOLIC BLOOD PRESSURE: 90 MMHG | DIASTOLIC BLOOD PRESSURE: 53 MMHG | OXYGEN SATURATION: 98 %

## 2022-01-01 VITALS
RESPIRATION RATE: 15 BRPM | HEIGHT: 60 IN | OXYGEN SATURATION: 99 % | SYSTOLIC BLOOD PRESSURE: 118 MMHG | HEART RATE: 62 BPM | DIASTOLIC BLOOD PRESSURE: 78 MMHG | BODY MASS INDEX: 19.63 KG/M2 | WEIGHT: 100 LBS

## 2022-01-01 VITALS
DIASTOLIC BLOOD PRESSURE: 72 MMHG | OXYGEN SATURATION: 95 % | TEMPERATURE: 98.8 F | RESPIRATION RATE: 18 BRPM | HEART RATE: 56 BPM | SYSTOLIC BLOOD PRESSURE: 185 MMHG

## 2022-01-01 VITALS
SYSTOLIC BLOOD PRESSURE: 155 MMHG | HEART RATE: 125 BPM | HEIGHT: 60 IN | WEIGHT: 110.01 LBS | RESPIRATION RATE: 20 BRPM | DIASTOLIC BLOOD PRESSURE: 77 MMHG | BODY MASS INDEX: 21.6 KG/M2 | OXYGEN SATURATION: 66 % | TEMPERATURE: 100 F

## 2022-01-01 DIAGNOSIS — F03.90 DEMENTIA WITHOUT BEHAVIORAL DISTURBANCE, UNSPECIFIED DEMENTIA TYPE: ICD-10-CM

## 2022-01-01 DIAGNOSIS — Z86.59 HISTORY OF DEMENTIA: ICD-10-CM
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
## 2022-01-01 DIAGNOSIS — I10 BENIGN ESSENTIAL HTN: ICD-10-CM

## 2022-01-01 DIAGNOSIS — I25.10 CORONARY ARTERY DISEASE INVOLVING NATIVE CORONARY ARTERY OF NATIVE HEART WITHOUT ANGINA PECTORIS: Primary | ICD-10-CM

## 2022-01-01 DIAGNOSIS — N18.9 CHRONIC KIDNEY DISEASE, UNSPECIFIED CKD STAGE: ICD-10-CM

## 2022-01-01 DIAGNOSIS — I10 HYPERTENSION, UNSPECIFIED TYPE: ICD-10-CM

## 2022-01-01 DIAGNOSIS — S72.002A CLOSED FRACTURE OF NECK OF LEFT FEMUR, INITIAL ENCOUNTER: Primary | ICD-10-CM
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
## 2022-01-01 DIAGNOSIS — S09.90XA CLOSED HEAD INJURY, INITIAL ENCOUNTER: ICD-10-CM

## 2022-01-01 DIAGNOSIS — D64.9 ANEMIA, UNSPECIFIED TYPE: ICD-10-CM

## 2022-01-01 LAB
25(OH)D3 SERPL-MCNC: 17.1 NG/ML (ref 30–100)
ALBUMIN SERPL-MCNC: 2.9 G/DL (ref 3.5–5.2)
ALBUMIN SERPL-MCNC: 4 G/DL (ref 3.5–5.2)
ALBUMIN/GLOB SERPL: 1.1 G/DL
ALBUMIN/GLOB SERPL: 1.5 G/DL
ALP SERPL-CCNC: 111 U/L (ref 39–117)
ALP SERPL-CCNC: 91 U/L (ref 39–117)
ALT SERPL W P-5'-P-CCNC: 17 U/L (ref 1–33)
ALT SERPL W P-5'-P-CCNC: 5 U/L (ref 1–33)
AMMONIA BLD-SCNC: 13 UMOL/L (ref 11–51)
ANION GAP SERPL CALCULATED.3IONS-SCNC: 11 MMOL/L (ref 5–15)
ANION GAP SERPL CALCULATED.3IONS-SCNC: 13 MMOL/L (ref 5–15)
ANION GAP SERPL CALCULATED.3IONS-SCNC: 14 MMOL/L (ref 5–15)
ANION GAP SERPL CALCULATED.3IONS-SCNC: 8 MMOL/L (ref 5–15)
ANION GAP SERPL CALCULATED.3IONS-SCNC: 8.8 MMOL/L (ref 5–15)
ANION GAP SERPL CALCULATED.3IONS-SCNC: 9 MMOL/L (ref 5–15)
ANION GAP SERPL CALCULATED.3IONS-SCNC: 9.6 MMOL/L (ref 5–15)
APTT PPP: 25.8 SECONDS (ref 22.7–35.4)
AST SERPL-CCNC: 26 U/L (ref 1–32)
AST SERPL-CCNC: 27 U/L (ref 1–32)
BACTERIA SPEC AEROBE CULT: ABNORMAL
BACTERIA UR QL AUTO: ABNORMAL /HPF
BASOPHILS # BLD AUTO: 0.03 10*3/MM3 (ref 0–0.2)
BASOPHILS NFR BLD AUTO: 0.3 % (ref 0–1.5)
BASOPHILS NFR BLD AUTO: 0.3 % (ref 0–1.5)
BASOPHILS NFR BLD AUTO: 0.4 % (ref 0–1.5)
BILIRUB SERPL-MCNC: 0.4 MG/DL (ref 0–1.2)
BILIRUB SERPL-MCNC: 0.4 MG/DL (ref 0–1.2)
BILIRUB UR QL STRIP: NEGATIVE
BUN SERPL-MCNC: 32 MG/DL (ref 8–23)
BUN SERPL-MCNC: 32 MG/DL (ref 8–23)
BUN SERPL-MCNC: 33 MG/DL (ref 8–23)
BUN SERPL-MCNC: 34 MG/DL (ref 8–23)
BUN SERPL-MCNC: 35 MG/DL (ref 8–23)
BUN/CREAT SERPL: 19.8 (ref 7–25)
BUN/CREAT SERPL: 20.2 (ref 7–25)
BUN/CREAT SERPL: 21.5 (ref 7–25)
BUN/CREAT SERPL: 25.4 (ref 7–25)
BUN/CREAT SERPL: 26.2 (ref 7–25)
BUN/CREAT SERPL: 26.4 (ref 7–25)
BUN/CREAT SERPL: 28.7 (ref 7–25)
CALCIUM SPEC-SCNC: 7.5 MG/DL (ref 8.2–9.6)
CALCIUM SPEC-SCNC: 7.5 MG/DL (ref 8.2–9.6)
CALCIUM SPEC-SCNC: 8 MG/DL (ref 8.2–9.6)
CALCIUM SPEC-SCNC: 8 MG/DL (ref 8.2–9.6)
CALCIUM SPEC-SCNC: 8.2 MG/DL (ref 8.2–9.6)
CALCIUM SPEC-SCNC: 8.4 MG/DL (ref 8.2–9.6)
CALCIUM SPEC-SCNC: 8.6 MG/DL (ref 8.2–9.6)
CHLORIDE SERPL-SCNC: 104 MMOL/L (ref 98–107)
CHLORIDE SERPL-SCNC: 105 MMOL/L (ref 98–107)
CHLORIDE SERPL-SCNC: 107 MMOL/L (ref 98–107)
CHLORIDE SERPL-SCNC: 109 MMOL/L (ref 98–107)
CHLORIDE SERPL-SCNC: 110 MMOL/L (ref 98–107)
CLARITY UR: ABNORMAL
CO2 SERPL-SCNC: 18 MMOL/L (ref 22–29)
CO2 SERPL-SCNC: 20 MMOL/L (ref 22–29)
CO2 SERPL-SCNC: 22 MMOL/L (ref 22–29)
CO2 SERPL-SCNC: 22 MMOL/L (ref 22–29)
CO2 SERPL-SCNC: 24 MMOL/L (ref 22–29)
CO2 SERPL-SCNC: 24.2 MMOL/L (ref 22–29)
CO2 SERPL-SCNC: 24.4 MMOL/L (ref 22–29)
COLOR UR: YELLOW
CREAT SERPL-MCNC: 1.22 MG/DL (ref 0.57–1)
CREAT SERPL-MCNC: 1.22 MG/DL (ref 0.57–1)
CREAT SERPL-MCNC: 1.25 MG/DL (ref 0.57–1)
CREAT SERPL-MCNC: 1.26 MG/DL (ref 0.57–1)
CREAT SERPL-MCNC: 1.58 MG/DL (ref 0.57–1)
CREAT SERPL-MCNC: 1.63 MG/DL (ref 0.57–1)
CREAT SERPL-MCNC: 1.67 MG/DL (ref 0.57–1)
DEPRECATED RDW RBC AUTO: 47.8 FL (ref 37–54)
DEPRECATED RDW RBC AUTO: 49.1 FL (ref 37–54)
DEPRECATED RDW RBC AUTO: 49.2 FL (ref 37–54)
DEPRECATED RDW RBC AUTO: 49.3 FL (ref 37–54)
DEPRECATED RDW RBC AUTO: 49.3 FL (ref 37–54)
DEPRECATED RDW RBC AUTO: 49.9 FL (ref 37–54)
DEPRECATED RDW RBC AUTO: 50.3 FL (ref 37–54)
EGFRCR SERPLBLD CKD-EPI 2021: 29 ML/MIN/1.73
EGFRCR SERPLBLD CKD-EPI 2021: 29.8 ML/MIN/1.73
EGFRCR SERPLBLD CKD-EPI 2021: 31 ML/MIN/1.73
EGFRCR SERPLBLD CKD-EPI 2021: 40.6 ML/MIN/1.73
EGFRCR SERPLBLD CKD-EPI 2021: 41 ML/MIN/1.73
EGFRCR SERPLBLD CKD-EPI 2021: 42.2 ML/MIN/1.73
EGFRCR SERPLBLD CKD-EPI 2021: 42.2 ML/MIN/1.73
EOSINOPHIL # BLD AUTO: 0.01 10*3/MM3 (ref 0–0.4)
EOSINOPHIL # BLD AUTO: 0.08 10*3/MM3 (ref 0–0.4)
EOSINOPHIL # BLD AUTO: 0.14 10*3/MM3 (ref 0–0.4)
EOSINOPHIL NFR BLD AUTO: 0.1 % (ref 0.3–6.2)
EOSINOPHIL NFR BLD AUTO: 0.8 % (ref 0.3–6.2)
EOSINOPHIL NFR BLD AUTO: 1.7 % (ref 0.3–6.2)
ERYTHROCYTE [DISTWIDTH] IN BLOOD BY AUTOMATED COUNT: 13.8 % (ref 12.3–15.4)
ERYTHROCYTE [DISTWIDTH] IN BLOOD BY AUTOMATED COUNT: 13.9 % (ref 12.3–15.4)
ERYTHROCYTE [DISTWIDTH] IN BLOOD BY AUTOMATED COUNT: 14.2 % (ref 12.3–15.4)
ERYTHROCYTE [DISTWIDTH] IN BLOOD BY AUTOMATED COUNT: 14.3 % (ref 12.3–15.4)
ERYTHROCYTE [DISTWIDTH] IN BLOOD BY AUTOMATED COUNT: 14.4 % (ref 12.3–15.4)
FOLATE SERPL-MCNC: 4.04 NG/ML (ref 4.78–24.2)
GLOBULIN UR ELPH-MCNC: 2.6 GM/DL
GLOBULIN UR ELPH-MCNC: 2.7 GM/DL
GLUCOSE BLDC GLUCOMTR-MCNC: 121 MG/DL (ref 70–130)
GLUCOSE SERPL-MCNC: 108 MG/DL (ref 65–99)
GLUCOSE SERPL-MCNC: 113 MG/DL (ref 65–99)
GLUCOSE SERPL-MCNC: 78 MG/DL (ref 65–99)
GLUCOSE SERPL-MCNC: 87 MG/DL (ref 65–99)
GLUCOSE SERPL-MCNC: 92 MG/DL (ref 65–99)
GLUCOSE SERPL-MCNC: 93 MG/DL (ref 65–99)
GLUCOSE SERPL-MCNC: 93 MG/DL (ref 65–99)
GLUCOSE UR STRIP-MCNC: NEGATIVE MG/DL
HCT VFR BLD AUTO: 27.4 % (ref 34–46.6)
HCT VFR BLD AUTO: 31 % (ref 34–46.6)
HCT VFR BLD AUTO: 32 % (ref 34–46.6)
HCT VFR BLD AUTO: 32.2 % (ref 34–46.6)
HCT VFR BLD AUTO: 32.6 % (ref 34–46.6)
HCT VFR BLD AUTO: 34 % (ref 34–46.6)
HCT VFR BLD AUTO: 35.7 % (ref 34–46.6)
HGB BLD-MCNC: 10 G/DL (ref 12–15.9)
HGB BLD-MCNC: 10.1 G/DL (ref 12–15.9)
HGB BLD-MCNC: 10.4 G/DL (ref 12–15.9)
HGB BLD-MCNC: 10.5 G/DL (ref 12–15.9)
HGB BLD-MCNC: 11.3 G/DL (ref 12–15.9)
HGB BLD-MCNC: 11.4 G/DL (ref 12–15.9)
HGB BLD-MCNC: 9 G/DL (ref 12–15.9)
HGB UR QL STRIP.AUTO: NEGATIVE
HYALINE CASTS UR QL AUTO: ABNORMAL /LPF
IMM GRANULOCYTES # BLD AUTO: 0.03 10*3/MM3 (ref 0–0.05)
IMM GRANULOCYTES # BLD AUTO: 0.04 10*3/MM3 (ref 0–0.05)
IMM GRANULOCYTES # BLD AUTO: 0.06 10*3/MM3 (ref 0–0.05)
IMM GRANULOCYTES NFR BLD AUTO: 0.4 % (ref 0–0.5)
IMM GRANULOCYTES NFR BLD AUTO: 0.4 % (ref 0–0.5)
IMM GRANULOCYTES NFR BLD AUTO: 0.5 % (ref 0–0.5)
INR PPP: 1.09 (ref 0.9–1.1)
INR PPP: 1.14 (ref 0.9–1.1)
KETONES UR QL STRIP: NEGATIVE
LEUKOCYTE ESTERASE UR QL STRIP.AUTO: ABNORMAL
LYMPHOCYTES # BLD AUTO: 1.18 10*3/MM3 (ref 0.7–3.1)
LYMPHOCYTES # BLD AUTO: 1.21 10*3/MM3 (ref 0.7–3.1)
LYMPHOCYTES # BLD AUTO: 1.24 10*3/MM3 (ref 0.7–3.1)
LYMPHOCYTES NFR BLD AUTO: 10.8 % (ref 19.6–45.3)
LYMPHOCYTES NFR BLD AUTO: 12.5 % (ref 19.6–45.3)
LYMPHOCYTES NFR BLD AUTO: 14.8 % (ref 19.6–45.3)
MAGNESIUM SERPL-MCNC: 1.9 MG/DL (ref 1.6–2.4)
MAGNESIUM SERPL-MCNC: 2 MG/DL (ref 1.6–2.4)
MAGNESIUM SERPL-MCNC: 2.1 MG/DL (ref 1.6–2.4)
MCH RBC QN AUTO: 30.5 PG (ref 26.6–33)
MCH RBC QN AUTO: 30.6 PG (ref 26.6–33)
MCH RBC QN AUTO: 30.8 PG (ref 26.6–33)
MCH RBC QN AUTO: 31.3 PG (ref 26.6–33)
MCH RBC QN AUTO: 31.4 PG (ref 26.6–33)
MCHC RBC AUTO-ENTMCNC: 31.6 G/DL (ref 31.5–35.7)
MCHC RBC AUTO-ENTMCNC: 31.7 G/DL (ref 31.5–35.7)
MCHC RBC AUTO-ENTMCNC: 31.9 G/DL (ref 31.5–35.7)
MCHC RBC AUTO-ENTMCNC: 32.3 G/DL (ref 31.5–35.7)
MCHC RBC AUTO-ENTMCNC: 32.6 G/DL (ref 31.5–35.7)
MCHC RBC AUTO-ENTMCNC: 32.8 G/DL (ref 31.5–35.7)
MCHC RBC AUTO-ENTMCNC: 33.5 G/DL (ref 31.5–35.7)
MCV RBC AUTO: 93.7 FL (ref 79–97)
MCV RBC AUTO: 93.8 FL (ref 79–97)
MCV RBC AUTO: 94.8 FL (ref 79–97)
MCV RBC AUTO: 95.6 FL (ref 79–97)
MCV RBC AUTO: 95.8 FL (ref 79–97)
MCV RBC AUTO: 96.5 FL (ref 79–97)
MCV RBC AUTO: 96.7 FL (ref 79–97)
MONOCYTES # BLD AUTO: 0.39 10*3/MM3 (ref 0.1–0.9)
MONOCYTES # BLD AUTO: 0.48 10*3/MM3 (ref 0.1–0.9)
MONOCYTES # BLD AUTO: 0.65 10*3/MM3 (ref 0.1–0.9)
MONOCYTES NFR BLD AUTO: 4.6 % (ref 5–12)
MONOCYTES NFR BLD AUTO: 5.1 % (ref 5–12)
MONOCYTES NFR BLD AUTO: 5.8 % (ref 5–12)
NEUTROPHILS NFR BLD AUTO: 6.57 10*3/MM3 (ref 1.7–7)
NEUTROPHILS NFR BLD AUTO: 7.64 10*3/MM3 (ref 1.7–7)
NEUTROPHILS NFR BLD AUTO: 78.1 % (ref 42.7–76)
NEUTROPHILS NFR BLD AUTO: 80.9 % (ref 42.7–76)
NEUTROPHILS NFR BLD AUTO: 82.5 % (ref 42.7–76)
NEUTROPHILS NFR BLD AUTO: 9.24 10*3/MM3 (ref 1.7–7)
NITRITE UR QL STRIP: NEGATIVE
NRBC BLD AUTO-RTO: 0 /100 WBC (ref 0–0.2)
PH UR STRIP.AUTO: 7 [PH] (ref 5–8)
PHOSPHATE SERPL-MCNC: 2.5 MG/DL (ref 2.5–4.5)
PLATELET # BLD AUTO: 144 10*3/MM3 (ref 140–450)
PLATELET # BLD AUTO: 151 10*3/MM3 (ref 140–450)
PLATELET # BLD AUTO: 156 10*3/MM3 (ref 140–450)
PLATELET # BLD AUTO: 167 10*3/MM3 (ref 140–450)
PLATELET # BLD AUTO: 173 10*3/MM3 (ref 140–450)
PLATELET # BLD AUTO: 177 10*3/MM3 (ref 140–450)
PLATELET # BLD AUTO: 184 10*3/MM3 (ref 140–450)
PMV BLD AUTO: 10 FL (ref 6–12)
PMV BLD AUTO: 9.4 FL (ref 6–12)
PMV BLD AUTO: 9.5 FL (ref 6–12)
PMV BLD AUTO: 9.6 FL (ref 6–12)
PMV BLD AUTO: 9.6 FL (ref 6–12)
PMV BLD AUTO: 9.8 FL (ref 6–12)
PMV BLD AUTO: 9.8 FL (ref 6–12)
POTASSIUM SERPL-SCNC: 3.7 MMOL/L (ref 3.5–5.2)
POTASSIUM SERPL-SCNC: 3.8 MMOL/L (ref 3.5–5.2)
POTASSIUM SERPL-SCNC: 4.1 MMOL/L (ref 3.5–5.2)
POTASSIUM SERPL-SCNC: 4.1 MMOL/L (ref 3.5–5.2)
POTASSIUM SERPL-SCNC: 4.5 MMOL/L (ref 3.5–5.2)
POTASSIUM SERPL-SCNC: 4.7 MMOL/L (ref 3.5–5.2)
POTASSIUM SERPL-SCNC: 5.2 MMOL/L (ref 3.5–5.2)
PROT SERPL-MCNC: 5.5 G/DL (ref 6–8.5)
PROT SERPL-MCNC: 6.7 G/DL (ref 6–8.5)
PROT UR QL STRIP: ABNORMAL
PROTHROMBIN TIME: 14.1 SECONDS (ref 11.7–14.2)
PROTHROMBIN TIME: 14.6 SECONDS (ref 11.7–14.2)
QT INTERVAL: 384 MS
RBC # BLD AUTO: 2.92 10*6/MM3 (ref 3.77–5.28)
RBC # BLD AUTO: 3.27 10*6/MM3 (ref 3.77–5.28)
RBC # BLD AUTO: 3.31 10*6/MM3 (ref 3.77–5.28)
RBC # BLD AUTO: 3.36 10*6/MM3 (ref 3.77–5.28)
RBC # BLD AUTO: 3.41 10*6/MM3 (ref 3.77–5.28)
RBC # BLD AUTO: 3.63 10*6/MM3 (ref 3.77–5.28)
RBC # BLD AUTO: 3.7 10*6/MM3 (ref 3.77–5.28)
RBC # UR STRIP: ABNORMAL /HPF
REF LAB TEST METHOD: ABNORMAL
SARS-COV-2 RNA PNL SPEC NAA+PROBE: NOT DETECTED
SODIUM SERPL-SCNC: 137 MMOL/L (ref 136–145)
SODIUM SERPL-SCNC: 138 MMOL/L (ref 136–145)
SODIUM SERPL-SCNC: 138 MMOL/L (ref 136–145)
SODIUM SERPL-SCNC: 139 MMOL/L (ref 136–145)
SODIUM SERPL-SCNC: 139 MMOL/L (ref 136–145)
SODIUM SERPL-SCNC: 141 MMOL/L (ref 136–145)
SODIUM SERPL-SCNC: 141 MMOL/L (ref 136–145)
SP GR UR STRIP: 1.01 (ref 1–1.03)
SQUAMOUS #/AREA URNS HPF: ABNORMAL /HPF
TROPONIN T SERPL-MCNC: 0.01 NG/ML (ref 0–0.03)
TSH SERPL DL<=0.05 MIU/L-ACNC: 18.3 UIU/ML (ref 0.27–4.2)
UROBILINOGEN UR QL STRIP: ABNORMAL
VIT B12 BLD-MCNC: 863 PG/ML (ref 211–946)
WBC # UR STRIP: ABNORMAL /HPF
WBC NRBC COR # BLD: 11.2 10*3/MM3 (ref 3.4–10.8)
WBC NRBC COR # BLD: 8.4 10*3/MM3 (ref 3.4–10.8)
WBC NRBC COR # BLD: 8.52 10*3/MM3 (ref 3.4–10.8)
WBC NRBC COR # BLD: 9.11 10*3/MM3 (ref 3.4–10.8)
WBC NRBC COR # BLD: 9.4 10*3/MM3 (ref 3.4–10.8)
WBC NRBC COR # BLD: 9.45 10*3/MM3 (ref 3.4–10.8)
WBC NRBC COR # BLD: 9.52 10*3/MM3 (ref 3.4–10.8)

## 2022-01-01 PROCEDURE — 84100 ASSAY OF PHOSPHORUS: CPT | Performed by: INTERNAL MEDICINE

## 2022-01-01 PROCEDURE — 25010000002 LORAZEPAM PER 2 MG: Performed by: HOSPITALIST

## 2022-01-01 PROCEDURE — 25010000002 ONDANSETRON PER 1 MG: Performed by: NURSE ANESTHETIST, CERTIFIED REGISTERED

## 2022-01-01 PROCEDURE — 72125 CT NECK SPINE W/O DYE: CPT

## 2022-01-01 PROCEDURE — 25010000002 LORAZEPAM PER 2 MG: Performed by: STUDENT IN AN ORGANIZED HEALTH CARE EDUCATION/TRAINING PROGRAM

## 2022-01-01 PROCEDURE — 84443 ASSAY THYROID STIM HORMONE: CPT | Performed by: HOSPITALIST

## 2022-01-01 PROCEDURE — 97530 THERAPEUTIC ACTIVITIES: CPT

## 2022-01-01 PROCEDURE — 25010000002 HYDRALAZINE PER 20 MG: Performed by: INTERNAL MEDICINE

## 2022-01-01 PROCEDURE — 80048 BASIC METABOLIC PNL TOTAL CA: CPT | Performed by: ORTHOPAEDIC SURGERY

## 2022-01-01 PROCEDURE — 85025 COMPLETE CBC W/AUTO DIFF WBC: CPT | Performed by: HOSPITALIST

## 2022-01-01 PROCEDURE — 80053 COMPREHEN METABOLIC PANEL: CPT | Performed by: HOSPITALIST

## 2022-01-01 PROCEDURE — 80048 BASIC METABOLIC PNL TOTAL CA: CPT | Performed by: STUDENT IN AN ORGANIZED HEALTH CARE EDUCATION/TRAINING PROGRAM

## 2022-01-01 PROCEDURE — 25010000002 HYDROMORPHONE PER 4 MG: Performed by: HOSPITALIST

## 2022-01-01 PROCEDURE — 25010000002 HYDROMORPHONE 1 MG/ML SOLUTION: Performed by: HOSPITALIST

## 2022-01-01 PROCEDURE — 25010000002 HYDROMORPHONE PER 4 MG: Performed by: ORTHOPAEDIC SURGERY

## 2022-01-01 PROCEDURE — 99222 1ST HOSP IP/OBS MODERATE 55: CPT | Performed by: INTERNAL MEDICINE

## 2022-01-01 PROCEDURE — 25010000002 CEFTRIAXONE PER 250 MG: Performed by: NURSE PRACTITIONER

## 2022-01-01 PROCEDURE — 80053 COMPREHEN METABOLIC PANEL: CPT | Performed by: EMERGENCY MEDICINE

## 2022-01-01 PROCEDURE — 70450 CT HEAD/BRAIN W/O DYE: CPT

## 2022-01-01 PROCEDURE — 97166 OT EVAL MOD COMPLEX 45 MIN: CPT

## 2022-01-01 PROCEDURE — 80048 BASIC METABOLIC PNL TOTAL CA: CPT | Performed by: HOSPITALIST

## 2022-01-01 PROCEDURE — 25010000002 MORPHINE PER 10 MG: Performed by: STUDENT IN AN ORGANIZED HEALTH CARE EDUCATION/TRAINING PROGRAM

## 2022-01-01 PROCEDURE — 73501 X-RAY EXAM HIP UNI 1 VIEW: CPT

## 2022-01-01 PROCEDURE — 99231 SBSQ HOSP IP/OBS SF/LOW 25: CPT | Performed by: INTERNAL MEDICINE

## 2022-01-01 PROCEDURE — 80048 BASIC METABOLIC PNL TOTAL CA: CPT | Performed by: INTERNAL MEDICINE

## 2022-01-01 PROCEDURE — 85730 THROMBOPLASTIN TIME PARTIAL: CPT | Performed by: EMERGENCY MEDICINE

## 2022-01-01 PROCEDURE — C1776 JOINT DEVICE (IMPLANTABLE): HCPCS | Performed by: ORTHOPAEDIC SURGERY

## 2022-01-01 PROCEDURE — 25010000002 HYDROMORPHONE PER 4 MG: Performed by: STUDENT IN AN ORGANIZED HEALTH CARE EDUCATION/TRAINING PROGRAM

## 2022-01-01 PROCEDURE — 93005 ELECTROCARDIOGRAM TRACING: CPT | Performed by: EMERGENCY MEDICINE

## 2022-01-01 PROCEDURE — 25010000002 MORPHINE PER 10 MG: Performed by: NURSE PRACTITIONER

## 2022-01-01 PROCEDURE — 83735 ASSAY OF MAGNESIUM: CPT | Performed by: INTERNAL MEDICINE

## 2022-01-01 PROCEDURE — 85610 PROTHROMBIN TIME: CPT | Performed by: NURSE PRACTITIONER

## 2022-01-01 PROCEDURE — 0SRS019 REPLACEMENT OF LEFT HIP JOINT, FEMORAL SURFACE WITH METAL SYNTHETIC SUBSTITUTE, CEMENTED, OPEN APPROACH: ICD-10-PCS | Performed by: ORTHOPAEDIC SURGERY

## 2022-01-01 PROCEDURE — 25010000002 FENTANYL CITRATE (PF) 50 MCG/ML SOLUTION: Performed by: EMERGENCY MEDICINE

## 2022-01-01 PROCEDURE — 82962 GLUCOSE BLOOD TEST: CPT

## 2022-01-01 PROCEDURE — 87077 CULTURE AEROBIC IDENTIFY: CPT | Performed by: NURSE PRACTITIONER

## 2022-01-01 PROCEDURE — 82607 VITAMIN B-12: CPT | Performed by: HOSPITALIST

## 2022-01-01 PROCEDURE — 25010000002 HYDRALAZINE PER 20 MG: Performed by: EMERGENCY MEDICINE

## 2022-01-01 PROCEDURE — 97535 SELF CARE MNGMENT TRAINING: CPT

## 2022-01-01 PROCEDURE — 73502 X-RAY EXAM HIP UNI 2-3 VIEWS: CPT

## 2022-01-01 PROCEDURE — 81001 URINALYSIS AUTO W/SCOPE: CPT | Performed by: EMERGENCY MEDICINE

## 2022-01-01 PROCEDURE — 85027 COMPLETE CBC AUTOMATED: CPT | Performed by: ORTHOPAEDIC SURGERY

## 2022-01-01 PROCEDURE — 25010000002 CEFTRIAXONE PER 250 MG: Performed by: ORTHOPAEDIC SURGERY

## 2022-01-01 PROCEDURE — 83735 ASSAY OF MAGNESIUM: CPT | Performed by: HOSPITALIST

## 2022-01-01 PROCEDURE — 96372 THER/PROPH/DIAG INJ SC/IM: CPT

## 2022-01-01 PROCEDURE — 97162 PT EVAL MOD COMPLEX 30 MIN: CPT

## 2022-01-01 PROCEDURE — 99238 HOSP IP/OBS DSCHRG MGMT 30/<: CPT | Performed by: INTERNAL MEDICINE

## 2022-01-01 PROCEDURE — 25010000002 HYDROMORPHONE 1 MG/ML SOLUTION: Performed by: STUDENT IN AN ORGANIZED HEALTH CARE EDUCATION/TRAINING PROGRAM

## 2022-01-01 PROCEDURE — 87186 SC STD MICRODIL/AGAR DIL: CPT | Performed by: NURSE PRACTITIONER

## 2022-01-01 PROCEDURE — 85025 COMPLETE CBC W/AUTO DIFF WBC: CPT | Performed by: EMERGENCY MEDICINE

## 2022-01-01 PROCEDURE — 82746 ASSAY OF FOLIC ACID SERUM: CPT | Performed by: HOSPITALIST

## 2022-01-01 PROCEDURE — L1830 KO IMMOB CANVAS LONG PRE OTS: HCPCS | Performed by: ORTHOPAEDIC SURGERY

## 2022-01-01 PROCEDURE — 99222 1ST HOSP IP/OBS MODERATE 55: CPT | Performed by: ORTHOPAEDIC SURGERY

## 2022-01-01 PROCEDURE — 85027 COMPLETE CBC AUTOMATED: CPT | Performed by: STUDENT IN AN ORGANIZED HEALTH CARE EDUCATION/TRAINING PROGRAM

## 2022-01-01 PROCEDURE — 73552 X-RAY EXAM OF FEMUR 2/>: CPT

## 2022-01-01 PROCEDURE — 25010000002 ZIPRASIDONE MESYLATE PER 10 MG: Performed by: PHYSICIAN ASSISTANT

## 2022-01-01 PROCEDURE — 85610 PROTHROMBIN TIME: CPT | Performed by: EMERGENCY MEDICINE

## 2022-01-01 PROCEDURE — 25010000002 FENTANYL CITRATE (PF) 50 MCG/ML SOLUTION: Performed by: NURSE ANESTHETIST, CERTIFIED REGISTERED

## 2022-01-01 PROCEDURE — 82140 ASSAY OF AMMONIA: CPT | Performed by: HOSPITALIST

## 2022-01-01 PROCEDURE — 85027 COMPLETE CBC AUTOMATED: CPT | Performed by: INTERNAL MEDICINE

## 2022-01-01 PROCEDURE — 99214 OFFICE O/P EST MOD 30 MIN: CPT | Performed by: NURSE PRACTITIONER

## 2022-01-01 PROCEDURE — 99283 EMERGENCY DEPT VISIT LOW MDM: CPT

## 2022-01-01 PROCEDURE — 36415 COLL VENOUS BLD VENIPUNCTURE: CPT | Performed by: NURSE PRACTITIONER

## 2022-01-01 PROCEDURE — 82306 VITAMIN D 25 HYDROXY: CPT | Performed by: HOSPITALIST

## 2022-01-01 PROCEDURE — 27236 TREAT THIGH FRACTURE: CPT | Performed by: ORTHOPAEDIC SURGERY

## 2022-01-01 PROCEDURE — 84484 ASSAY OF TROPONIN QUANT: CPT | Performed by: EMERGENCY MEDICINE

## 2022-01-01 PROCEDURE — 87086 URINE CULTURE/COLONY COUNT: CPT | Performed by: NURSE PRACTITIONER

## 2022-01-01 PROCEDURE — C1713 ANCHOR/SCREW BN/BN,TIS/BN: HCPCS | Performed by: ORTHOPAEDIC SURGERY

## 2022-01-01 PROCEDURE — 87635 SARS-COV-2 COVID-19 AMP PRB: CPT | Performed by: EMERGENCY MEDICINE

## 2022-01-01 PROCEDURE — 25010000002 MORPHINE PER 10 MG: Performed by: EMERGENCY MEDICINE

## 2022-01-01 PROCEDURE — 25010000002 CEFAZOLIN IN DEXTROSE 2-4 GM/100ML-% SOLUTION: Performed by: ORTHOPAEDIC SURGERY

## 2022-01-01 PROCEDURE — 25010000002 DEXAMETHASONE PER 1 MG: Performed by: NURSE ANESTHETIST, CERTIFIED REGISTERED

## 2022-01-01 PROCEDURE — 80048 BASIC METABOLIC PNL TOTAL CA: CPT | Performed by: NURSE PRACTITIONER

## 2022-01-01 PROCEDURE — 99284 EMERGENCY DEPT VISIT MOD MDM: CPT

## 2022-01-01 PROCEDURE — 85027 COMPLETE CBC AUTOMATED: CPT | Performed by: HOSPITALIST

## 2022-01-01 PROCEDURE — 25010000002 NEOSTIGMINE 5 MG/10ML SOLUTION: Performed by: NURSE ANESTHETIST, CERTIFIED REGISTERED

## 2022-01-01 PROCEDURE — 25010000002 HYDRALAZINE PER 20 MG: Performed by: NURSE ANESTHETIST, CERTIFIED REGISTERED

## 2022-01-01 PROCEDURE — 71045 X-RAY EXAM CHEST 1 VIEW: CPT

## 2022-01-01 PROCEDURE — 93010 ELECTROCARDIOGRAM REPORT: CPT | Performed by: INTERNAL MEDICINE

## 2022-01-01 PROCEDURE — 93000 ELECTROCARDIOGRAM COMPLETE: CPT | Performed by: NURSE PRACTITIONER

## 2022-01-01 PROCEDURE — 85025 COMPLETE CBC W/AUTO DIFF WBC: CPT | Performed by: NURSE PRACTITIONER

## 2022-01-01 PROCEDURE — 25010000002 PROPOFOL 10 MG/ML EMULSION: Performed by: NURSE ANESTHETIST, CERTIFIED REGISTERED

## 2022-01-01 DEVICE — CONQUEST FX FEMORAL COMPONENT SIZE 10
Type: IMPLANTABLE DEVICE | Site: HIP | Status: FUNCTIONAL
Brand: CONQUEST FX

## 2022-01-01 DEVICE — TANDEM BIPOLAR COBALT CHROME 41MM                                    OUTER DIAMETER 22MM INNER DIAMETER
Type: IMPLANTABLE DEVICE | Site: HIP | Status: FUNCTIONAL
Brand: TANDEM

## 2022-01-01 DEVICE — INVIS DISTAL CENTRALIZER SIZE 10MM
Type: IMPLANTABLE DEVICE | Site: HIP | Status: FUNCTIONAL
Brand: INVIS

## 2022-01-01 DEVICE — PALACOS® R IS A FAST-CURING, RADIOPAQUE, POLY(METHYL METHACRYLATE)-BASED BONE CEMENT.PALACOS ® R CONTAINS THE X-RAY CONTRAST MEDIUM ZIRCONIUM DIOXIDE. TO IMPROVE VISIBILITY IN THE SURGICAL FIELD PALACOS ® R HAS BEEN COLOURED WITH CHLOROPHYLL (E141). THE BONE CEMENT IS PREPARED DIRECTLY BEFORE USE BY MIXING A POLYMER POWDER COMPONENT WITH A LIQUID MONOMER COMPONENT. A DUCTILE DOUGH FORMS WHICH CURES WITHIN A FEW MINUTES.
Type: IMPLANTABLE DEVICE | Site: HIP | Status: FUNCTIONAL
Brand: PALACOS®

## 2022-01-01 DEVICE — COBALT CHROME 12/14 TAPER FEMORAL                                    HEAD 22MM + 0: Type: IMPLANTABLE DEVICE | Site: HIP | Status: FUNCTIONAL

## 2022-01-01 DEVICE — IMPLANTABLE DEVICE: Type: IMPLANTABLE DEVICE | Site: HIP | Status: FUNCTIONAL

## 2022-01-01 RX ORDER — SODIUM CHLORIDE 0.9 % (FLUSH) 0.9 %
10 SYRINGE (ML) INJECTION EVERY 12 HOURS SCHEDULED
Status: DISCONTINUED | OUTPATIENT
Start: 2022-01-01 | End: 2022-01-01

## 2022-01-01 RX ORDER — LORAZEPAM 2 MG/ML
1 INJECTION INTRAMUSCULAR
Status: DISCONTINUED | OUTPATIENT
Start: 2022-01-01 | End: 2022-01-01 | Stop reason: HOSPADM

## 2022-01-01 RX ORDER — ALLOPURINOL 100 MG/1
100 TABLET ORAL DAILY
Status: DISCONTINUED | OUTPATIENT
Start: 2022-01-01 | End: 2022-01-01

## 2022-01-01 RX ORDER — DONEPEZIL HYDROCHLORIDE 5 MG/1
5 TABLET, FILM COATED ORAL NIGHTLY
Status: DISCONTINUED | OUTPATIENT
Start: 2022-01-01 | End: 2022-01-01

## 2022-01-01 RX ORDER — BISACODYL 5 MG/1
5 TABLET, DELAYED RELEASE ORAL DAILY PRN
Status: DISCONTINUED | OUTPATIENT
Start: 2022-01-01 | End: 2022-01-01

## 2022-01-01 RX ORDER — SODIUM CHLORIDE, SODIUM LACTATE, POTASSIUM CHLORIDE, CALCIUM CHLORIDE 600; 310; 30; 20 MG/100ML; MG/100ML; MG/100ML; MG/100ML
9 INJECTION, SOLUTION INTRAVENOUS CONTINUOUS
Status: DISCONTINUED | OUTPATIENT
Start: 2022-01-01 | End: 2022-01-01

## 2022-01-01 RX ORDER — PROMETHAZINE HYDROCHLORIDE 25 MG/1
12.5 TABLET ORAL EVERY 4 HOURS PRN
Status: CANCELLED | OUTPATIENT
Start: 2022-01-01

## 2022-01-01 RX ORDER — POLYETHYLENE GLYCOL 3350 17 G/17G
17 POWDER, FOR SOLUTION ORAL DAILY PRN
Status: DISCONTINUED | OUTPATIENT
Start: 2022-01-01 | End: 2022-01-01

## 2022-01-01 RX ORDER — OXYCODONE HYDROCHLORIDE 5 MG/1
5 TABLET ORAL EVERY 4 HOURS PRN
Status: DISCONTINUED | OUTPATIENT
Start: 2022-01-01 | End: 2022-01-01

## 2022-01-01 RX ORDER — HYDRALAZINE HYDROCHLORIDE 20 MG/ML
10 INJECTION INTRAMUSCULAR; INTRAVENOUS EVERY 6 HOURS PRN
Status: DISCONTINUED | OUTPATIENT
Start: 2022-01-01 | End: 2022-01-01

## 2022-01-01 RX ORDER — EPHEDRINE SULFATE 50 MG/ML
INJECTION, SOLUTION INTRAVENOUS AS NEEDED
Status: DISCONTINUED | OUTPATIENT
Start: 2022-01-01 | End: 2022-01-01 | Stop reason: SURG

## 2022-01-01 RX ORDER — LIDOCAINE HYDROCHLORIDE 20 MG/ML
INJECTION, SOLUTION INFILTRATION; PERINEURAL AS NEEDED
Status: DISCONTINUED | OUTPATIENT
Start: 2022-01-01 | End: 2022-01-01 | Stop reason: SURG

## 2022-01-01 RX ORDER — LORAZEPAM 2 MG/ML
0.5 CONCENTRATE ORAL
Status: DISCONTINUED | OUTPATIENT
Start: 2022-01-01 | End: 2022-06-12 | Stop reason: HOSPADM

## 2022-01-01 RX ORDER — KETOROLAC TROMETHAMINE 30 MG/ML
15 INJECTION, SOLUTION INTRAMUSCULAR; INTRAVENOUS EVERY 6 HOURS PRN
Status: DISCONTINUED | OUTPATIENT
Start: 2022-01-01 | End: 2022-01-01

## 2022-01-01 RX ORDER — SCOLOPAMINE TRANSDERMAL SYSTEM 1 MG/1
1 PATCH, EXTENDED RELEASE TRANSDERMAL
Status: CANCELLED | OUTPATIENT
Start: 2022-01-01

## 2022-01-01 RX ORDER — SODIUM CHLORIDE 0.9 % (FLUSH) 0.9 %
3-10 SYRINGE (ML) INJECTION AS NEEDED
Status: DISCONTINUED | OUTPATIENT
Start: 2022-01-01 | End: 2022-01-01 | Stop reason: HOSPADM

## 2022-01-01 RX ORDER — MORPHINE SULFATE 2 MG/ML
4 INJECTION, SOLUTION INTRAMUSCULAR; INTRAVENOUS
Status: DISCONTINUED | OUTPATIENT
Start: 2022-01-01 | End: 2022-01-01 | Stop reason: HOSPADM

## 2022-01-01 RX ORDER — SCOLOPAMINE TRANSDERMAL SYSTEM 1 MG/1
1 PATCH, EXTENDED RELEASE TRANSDERMAL
Status: DISCONTINUED | OUTPATIENT
Start: 2022-01-01 | End: 2022-06-12 | Stop reason: HOSPADM

## 2022-01-01 RX ORDER — OLANZAPINE 10 MG/1
2.5 INJECTION, POWDER, LYOPHILIZED, FOR SOLUTION INTRAMUSCULAR ONCE
Status: COMPLETED | OUTPATIENT
Start: 2022-01-01 | End: 2022-01-01

## 2022-01-01 RX ORDER — HYDROCODONE BITARTRATE AND ACETAMINOPHEN 7.5; 325 MG/1; MG/1
1 TABLET ORAL ONCE AS NEEDED
Status: DISCONTINUED | OUTPATIENT
Start: 2022-01-01 | End: 2022-01-01 | Stop reason: HOSPADM

## 2022-01-01 RX ORDER — PROMETHAZINE HYDROCHLORIDE 25 MG/1
25 TABLET ORAL ONCE AS NEEDED
Status: DISCONTINUED | OUTPATIENT
Start: 2022-01-01 | End: 2022-01-01 | Stop reason: HOSPADM

## 2022-01-01 RX ORDER — PROMETHAZINE HYDROCHLORIDE 25 MG/1
25 SUPPOSITORY RECTAL ONCE AS NEEDED
Status: DISCONTINUED | OUTPATIENT
Start: 2022-01-01 | End: 2022-01-01 | Stop reason: HOSPADM

## 2022-01-01 RX ORDER — LEVOTHYROXINE SODIUM 0.15 MG/1
150 TABLET ORAL
Status: DISCONTINUED | OUTPATIENT
Start: 2022-01-01 | End: 2022-01-01

## 2022-01-01 RX ORDER — HYDRALAZINE HYDROCHLORIDE 20 MG/ML
10 INJECTION INTRAMUSCULAR; INTRAVENOUS ONCE
Status: COMPLETED | OUTPATIENT
Start: 2022-01-01 | End: 2022-01-01

## 2022-01-01 RX ORDER — FENTANYL CITRATE 50 UG/ML
25 INJECTION, SOLUTION INTRAMUSCULAR; INTRAVENOUS
Status: DISCONTINUED | OUTPATIENT
Start: 2022-01-01 | End: 2022-01-01

## 2022-01-01 RX ORDER — ZIPRASIDONE MESYLATE 20 MG/ML
10 INJECTION, POWDER, LYOPHILIZED, FOR SOLUTION INTRAMUSCULAR ONCE
Status: COMPLETED | OUTPATIENT
Start: 2022-01-01 | End: 2022-01-01

## 2022-01-01 RX ORDER — PROMETHAZINE HYDROCHLORIDE 6.25 MG/5ML
12.5 SYRUP ORAL EVERY 4 HOURS PRN
Status: DISCONTINUED | OUTPATIENT
Start: 2022-01-01 | End: 2022-01-01

## 2022-01-01 RX ORDER — LORAZEPAM 2 MG/ML
2 INJECTION INTRAMUSCULAR
Status: CANCELLED | OUTPATIENT
Start: 2022-01-01 | End: 2022-01-01

## 2022-01-01 RX ORDER — DIPHENOXYLATE HYDROCHLORIDE AND ATROPINE SULFATE 2.5; .025 MG/1; MG/1
1 TABLET ORAL
Status: DISCONTINUED | OUTPATIENT
Start: 2022-01-01 | End: 2022-01-01

## 2022-01-01 RX ORDER — PROMETHAZINE HYDROCHLORIDE 25 MG/1
12.5 TABLET ORAL EVERY 4 HOURS PRN
Status: DISCONTINUED | OUTPATIENT
Start: 2022-01-01 | End: 2022-01-01 | Stop reason: HOSPADM

## 2022-01-01 RX ORDER — MORPHINE SULFATE 20 MG/ML
20 SOLUTION ORAL
Status: DISCONTINUED | OUTPATIENT
Start: 2022-01-01 | End: 2022-06-12 | Stop reason: HOSPADM

## 2022-01-01 RX ORDER — MORPHINE SULFATE 20 MG/ML
20 SOLUTION ORAL
Status: ACTIVE | OUTPATIENT
Start: 2022-01-01 | End: 2022-01-01

## 2022-01-01 RX ORDER — GLYCOPYRROLATE 0.2 MG/ML
0.4 INJECTION INTRAMUSCULAR; INTRAVENOUS
Status: CANCELLED | OUTPATIENT
Start: 2022-01-01

## 2022-01-01 RX ORDER — LORAZEPAM 2 MG/ML
0.5 CONCENTRATE ORAL
Status: CANCELLED | OUTPATIENT
Start: 2022-01-01 | End: 2022-01-01

## 2022-01-01 RX ORDER — DIPHENOXYLATE HYDROCHLORIDE AND ATROPINE SULFATE 2.5; .025 MG/1; MG/1
1 TABLET ORAL
Status: CANCELLED | OUTPATIENT
Start: 2022-01-01 | End: 2022-01-01

## 2022-01-01 RX ORDER — MAGNESIUM HYDROXIDE 1200 MG/15ML
LIQUID ORAL AS NEEDED
Status: DISCONTINUED | OUTPATIENT
Start: 2022-01-01 | End: 2022-01-01 | Stop reason: HOSPADM

## 2022-01-01 RX ORDER — MORPHINE SULFATE 10 MG/ML
6 INJECTION INTRAMUSCULAR; INTRAVENOUS; SUBCUTANEOUS
Status: DISCONTINUED | OUTPATIENT
Start: 2022-01-01 | End: 2022-01-01

## 2022-01-01 RX ORDER — GLYCOPYRROLATE 0.2 MG/ML
INJECTION INTRAMUSCULAR; INTRAVENOUS AS NEEDED
Status: DISCONTINUED | OUTPATIENT
Start: 2022-01-01 | End: 2022-01-01 | Stop reason: SURG

## 2022-01-01 RX ORDER — NALOXONE HCL 0.4 MG/ML
0.2 VIAL (ML) INJECTION AS NEEDED
Status: DISCONTINUED | OUTPATIENT
Start: 2022-01-01 | End: 2022-01-01 | Stop reason: HOSPADM

## 2022-01-01 RX ORDER — LORAZEPAM 2 MG/ML
0.5 INJECTION INTRAMUSCULAR
Status: DISCONTINUED | OUTPATIENT
Start: 2022-01-01 | End: 2022-01-01 | Stop reason: HOSPADM

## 2022-01-01 RX ORDER — LORAZEPAM 2 MG/ML
0.5 INJECTION INTRAMUSCULAR
Status: DISCONTINUED | OUTPATIENT
Start: 2022-01-01 | End: 2022-06-12 | Stop reason: HOSPADM

## 2022-01-01 RX ORDER — OLANZAPINE 10 MG/1
2.5 INJECTION, POWDER, LYOPHILIZED, FOR SOLUTION INTRAMUSCULAR EVERY 6 HOURS PRN
Status: DISCONTINUED | OUTPATIENT
Start: 2022-01-01 | End: 2022-01-01

## 2022-01-01 RX ORDER — ACETAMINOPHEN 500 MG
1000 TABLET ORAL 3 TIMES DAILY
Status: DISCONTINUED | OUTPATIENT
Start: 2022-01-01 | End: 2022-01-01

## 2022-01-01 RX ORDER — CARVEDILOL 6.25 MG/1
6.25 TABLET ORAL 2 TIMES DAILY
Status: DISCONTINUED | OUTPATIENT
Start: 2022-01-01 | End: 2022-01-01

## 2022-01-01 RX ORDER — LEVOTHYROXINE SODIUM 125 MCG
TABLET ORAL
Qty: 90 TABLET | Refills: 1 | Status: SHIPPED | OUTPATIENT
Start: 2022-01-01

## 2022-01-01 RX ORDER — LORAZEPAM 2 MG/ML
0.5 INJECTION INTRAMUSCULAR
Status: CANCELLED | OUTPATIENT
Start: 2022-01-01 | End: 2022-01-01

## 2022-01-01 RX ORDER — DEXAMETHASONE SODIUM PHOSPHATE 10 MG/ML
INJECTION INTRAMUSCULAR; INTRAVENOUS AS NEEDED
Status: DISCONTINUED | OUTPATIENT
Start: 2022-01-01 | End: 2022-01-01 | Stop reason: SURG

## 2022-01-01 RX ORDER — NALOXONE HCL 0.4 MG/ML
0.4 VIAL (ML) INJECTION
Status: DISCONTINUED | OUTPATIENT
Start: 2022-01-01 | End: 2022-01-01

## 2022-01-01 RX ORDER — SODIUM CHLORIDE 0.9 % (FLUSH) 0.9 %
3 SYRINGE (ML) INJECTION EVERY 12 HOURS SCHEDULED
Status: DISCONTINUED | OUTPATIENT
Start: 2022-01-01 | End: 2022-01-01 | Stop reason: HOSPADM

## 2022-01-01 RX ORDER — LIDOCAINE HYDROCHLORIDE 10 MG/ML
0.5 INJECTION, SOLUTION EPIDURAL; INFILTRATION; INTRACAUDAL; PERINEURAL ONCE AS NEEDED
Status: DISCONTINUED | OUTPATIENT
Start: 2022-01-01 | End: 2022-01-01 | Stop reason: HOSPADM

## 2022-01-01 RX ORDER — HYDROCODONE BITARTRATE AND ACETAMINOPHEN 7.5; 325 MG/1; MG/1
1 TABLET ORAL EVERY 4 HOURS PRN
Status: DISCONTINUED | OUTPATIENT
Start: 2022-01-01 | End: 2022-01-01

## 2022-01-01 RX ORDER — LORAZEPAM 2 MG/ML
1 INJECTION INTRAMUSCULAR
Status: ACTIVE | OUTPATIENT
Start: 2022-01-01 | End: 2022-01-01

## 2022-01-01 RX ORDER — ACETAMINOPHEN 160 MG/5ML
650 SOLUTION ORAL EVERY 4 HOURS PRN
Status: DISCONTINUED | OUTPATIENT
Start: 2022-01-01 | End: 2022-01-01

## 2022-01-01 RX ORDER — GLYCOPYRROLATE 0.2 MG/ML
0.4 INJECTION INTRAMUSCULAR; INTRAVENOUS
Status: DISCONTINUED | OUTPATIENT
Start: 2022-01-01 | End: 2022-01-01 | Stop reason: HOSPADM

## 2022-01-01 RX ORDER — GLYCOPYRROLATE 0.2 MG/ML
0.2 INJECTION INTRAMUSCULAR; INTRAVENOUS
Status: CANCELLED | OUTPATIENT
Start: 2022-01-01

## 2022-01-01 RX ORDER — ONDANSETRON 2 MG/ML
4 INJECTION INTRAMUSCULAR; INTRAVENOUS EVERY 6 HOURS PRN
Status: DISCONTINUED | OUTPATIENT
Start: 2022-01-01 | End: 2022-01-01

## 2022-01-01 RX ORDER — ACETAMINOPHEN 325 MG/1
650 TABLET ORAL EVERY 4 HOURS PRN
Status: DISCONTINUED | OUTPATIENT
Start: 2022-01-01 | End: 2022-01-01 | Stop reason: HOSPADM

## 2022-01-01 RX ORDER — LORAZEPAM 2 MG/ML
1 CONCENTRATE ORAL
Status: DISCONTINUED | OUTPATIENT
Start: 2022-01-01 | End: 2022-01-01 | Stop reason: HOSPADM

## 2022-01-01 RX ORDER — DONEPEZIL HYDROCHLORIDE 10 MG/1
TABLET, FILM COATED ORAL
Qty: 30 TABLET | Refills: 3 | Status: SHIPPED | OUTPATIENT
Start: 2022-01-01

## 2022-01-01 RX ORDER — MORPHINE SULFATE 20 MG/ML
10 SOLUTION ORAL
Status: ACTIVE | OUTPATIENT
Start: 2022-01-01 | End: 2022-01-01

## 2022-01-01 RX ORDER — LORAZEPAM 2 MG/ML
1 INJECTION INTRAMUSCULAR
Status: CANCELLED | OUTPATIENT
Start: 2022-01-01 | End: 2022-01-01

## 2022-01-01 RX ORDER — PROMETHAZINE HYDROCHLORIDE 6.25 MG/5ML
12.5 SYRUP ORAL EVERY 4 HOURS PRN
Status: DISCONTINUED | OUTPATIENT
Start: 2022-01-01 | End: 2022-01-01 | Stop reason: HOSPADM

## 2022-01-01 RX ORDER — LORAZEPAM 2 MG/ML
0.5 INJECTION INTRAMUSCULAR
Status: ACTIVE | OUTPATIENT
Start: 2022-01-01 | End: 2022-01-01

## 2022-01-01 RX ORDER — MORPHINE SULFATE 20 MG/ML
20 SOLUTION ORAL
Status: CANCELLED | OUTPATIENT
Start: 2022-01-01 | End: 2022-01-01

## 2022-01-01 RX ORDER — GLYCOPYRROLATE 0.2 MG/ML
0.4 INJECTION INTRAMUSCULAR; INTRAVENOUS
Status: DISCONTINUED | OUTPATIENT
Start: 2022-01-01 | End: 2022-01-01

## 2022-01-01 RX ORDER — ACETAMINOPHEN 325 MG/1
650 TABLET ORAL EVERY 4 HOURS PRN
Status: DISCONTINUED | OUTPATIENT
Start: 2022-01-01 | End: 2022-01-01

## 2022-01-01 RX ORDER — LORAZEPAM 2 MG/ML
1 CONCENTRATE ORAL
Status: DISCONTINUED | OUTPATIENT
Start: 2022-01-01 | End: 2022-06-12 | Stop reason: HOSPADM

## 2022-01-01 RX ORDER — ONDANSETRON 4 MG/1
4 TABLET, FILM COATED ORAL EVERY 6 HOURS PRN
Status: DISCONTINUED | OUTPATIENT
Start: 2022-01-01 | End: 2022-01-01

## 2022-01-01 RX ORDER — MORPHINE SULFATE 20 MG/ML
20 SOLUTION ORAL
Status: DISCONTINUED | OUTPATIENT
Start: 2022-01-01 | End: 2022-01-01 | Stop reason: HOSPADM

## 2022-01-01 RX ORDER — PROMETHAZINE HYDROCHLORIDE 25 MG/1
12.5 TABLET ORAL EVERY 4 HOURS PRN
Status: DISCONTINUED | OUTPATIENT
Start: 2022-01-01 | End: 2022-01-01

## 2022-01-01 RX ORDER — PROPOFOL 10 MG/ML
VIAL (ML) INTRAVENOUS AS NEEDED
Status: DISCONTINUED | OUTPATIENT
Start: 2022-01-01 | End: 2022-01-01 | Stop reason: SURG

## 2022-01-01 RX ORDER — DIPHENHYDRAMINE HCL 25 MG
25 CAPSULE ORAL
Status: DISCONTINUED | OUTPATIENT
Start: 2022-01-01 | End: 2022-01-01 | Stop reason: HOSPADM

## 2022-01-01 RX ORDER — ACETAMINOPHEN 160 MG/5ML
650 SOLUTION ORAL EVERY 4 HOURS PRN
Status: CANCELLED | OUTPATIENT
Start: 2022-01-01

## 2022-01-01 RX ORDER — ASPIRIN 81 MG/1
81 TABLET ORAL 2 TIMES DAILY
Status: DISCONTINUED | OUTPATIENT
Start: 2022-01-01 | End: 2022-01-01

## 2022-01-01 RX ORDER — FENTANYL CITRATE 50 UG/ML
50 INJECTION, SOLUTION INTRAMUSCULAR; INTRAVENOUS
Status: DISCONTINUED | OUTPATIENT
Start: 2022-01-01 | End: 2022-01-01 | Stop reason: HOSPADM

## 2022-01-01 RX ORDER — CEFAZOLIN SODIUM 2 G/100ML
2 INJECTION, SOLUTION INTRAVENOUS EVERY 8 HOURS
Status: COMPLETED | OUTPATIENT
Start: 2022-01-01 | End: 2022-01-01

## 2022-01-01 RX ORDER — LORAZEPAM 2 MG/ML
2 CONCENTRATE ORAL
Status: DISCONTINUED | OUTPATIENT
Start: 2022-01-01 | End: 2022-01-01 | Stop reason: HOSPADM

## 2022-01-01 RX ORDER — EPHEDRINE SULFATE 50 MG/ML
5 INJECTION, SOLUTION INTRAVENOUS ONCE AS NEEDED
Status: DISCONTINUED | OUTPATIENT
Start: 2022-01-01 | End: 2022-01-01 | Stop reason: HOSPADM

## 2022-01-01 RX ORDER — LORAZEPAM 2 MG/ML
0.5 INJECTION INTRAMUSCULAR
Status: DISCONTINUED | OUTPATIENT
Start: 2022-01-01 | End: 2022-01-01

## 2022-01-01 RX ORDER — DIPHENOXYLATE HYDROCHLORIDE AND ATROPINE SULFATE 2.5; .025 MG/1; MG/1
1 TABLET ORAL
Status: DISCONTINUED | OUTPATIENT
Start: 2022-01-01 | End: 2022-01-01 | Stop reason: HOSPADM

## 2022-01-01 RX ORDER — FENTANYL CITRATE 50 UG/ML
25 INJECTION, SOLUTION INTRAMUSCULAR; INTRAVENOUS ONCE
Status: COMPLETED | OUTPATIENT
Start: 2022-01-01 | End: 2022-01-01

## 2022-01-01 RX ORDER — GLYCOPYRROLATE 0.2 MG/ML
0.2 INJECTION INTRAMUSCULAR; INTRAVENOUS
Status: DISCONTINUED | OUTPATIENT
Start: 2022-01-01 | End: 2022-01-01

## 2022-01-01 RX ORDER — DOCUSATE SODIUM 100 MG/1
100 CAPSULE, LIQUID FILLED ORAL 2 TIMES DAILY PRN
Status: DISCONTINUED | OUTPATIENT
Start: 2022-01-01 | End: 2022-01-01

## 2022-01-01 RX ORDER — GLYCOPYRROLATE 0.2 MG/ML
0.2 INJECTION INTRAMUSCULAR; INTRAVENOUS
Status: DISCONTINUED | OUTPATIENT
Start: 2022-01-01 | End: 2022-01-01 | Stop reason: HOSPADM

## 2022-01-01 RX ORDER — ACETAMINOPHEN 650 MG/1
650 SUPPOSITORY RECTAL EVERY 4 HOURS PRN
Status: DISCONTINUED | OUTPATIENT
Start: 2022-01-01 | End: 2022-06-12 | Stop reason: HOSPADM

## 2022-01-01 RX ORDER — LORAZEPAM 2 MG/ML
2 INJECTION INTRAMUSCULAR
Status: DISCONTINUED | OUTPATIENT
Start: 2022-01-01 | End: 2022-06-12 | Stop reason: HOSPADM

## 2022-01-01 RX ORDER — ALLOPURINOL 100 MG/1
100 TABLET ORAL DAILY
Qty: 30 TABLET | Refills: 3 | Status: SHIPPED | OUTPATIENT
Start: 2022-01-01

## 2022-01-01 RX ORDER — MORPHINE SULFATE 20 MG/ML
10 SOLUTION ORAL
Status: DISCONTINUED | OUTPATIENT
Start: 2022-01-01 | End: 2022-06-12 | Stop reason: HOSPADM

## 2022-01-01 RX ORDER — ACETAMINOPHEN 650 MG/1
650 SUPPOSITORY RECTAL EVERY 4 HOURS PRN
Status: DISCONTINUED | OUTPATIENT
Start: 2022-01-01 | End: 2022-01-01 | Stop reason: HOSPADM

## 2022-01-01 RX ORDER — SCOLOPAMINE TRANSDERMAL SYSTEM 1 MG/1
1 PATCH, EXTENDED RELEASE TRANSDERMAL
Status: DISCONTINUED | OUTPATIENT
Start: 2022-01-01 | End: 2022-01-01 | Stop reason: HOSPADM

## 2022-01-01 RX ORDER — MORPHINE SULFATE 4 MG/ML
4 INJECTION, SOLUTION INTRAMUSCULAR; INTRAVENOUS
Status: CANCELLED | OUTPATIENT
Start: 2022-01-01 | End: 2022-01-01

## 2022-01-01 RX ORDER — LORAZEPAM 2 MG/ML
2 INJECTION INTRAMUSCULAR
Status: DISCONTINUED | OUTPATIENT
Start: 2022-01-01 | End: 2022-01-01 | Stop reason: HOSPADM

## 2022-01-01 RX ORDER — MORPHINE SULFATE 20 MG/ML
10 SOLUTION ORAL
Status: DISCONTINUED | OUTPATIENT
Start: 2022-01-01 | End: 2022-01-01 | Stop reason: HOSPADM

## 2022-01-01 RX ORDER — ACETAMINOPHEN 650 MG/1
650 SUPPOSITORY RECTAL EVERY 4 HOURS PRN
Status: CANCELLED | OUTPATIENT
Start: 2022-01-01

## 2022-01-01 RX ORDER — LORAZEPAM 2 MG/ML
2 INJECTION INTRAMUSCULAR
Status: DISCONTINUED | OUTPATIENT
Start: 2022-01-01 | End: 2022-01-01

## 2022-01-01 RX ORDER — PROMETHAZINE HYDROCHLORIDE 12.5 MG/1
12.5 SUPPOSITORY RECTAL EVERY 4 HOURS PRN
Status: DISCONTINUED | OUTPATIENT
Start: 2022-01-01 | End: 2022-01-01

## 2022-01-01 RX ORDER — ALLOPURINOL 100 MG/1
TABLET ORAL
Qty: 30 TABLET | Refills: 0 | Status: SHIPPED | OUTPATIENT
Start: 2022-01-01 | End: 2022-01-01 | Stop reason: SDUPTHER

## 2022-01-01 RX ORDER — CARVEDILOL 6.25 MG/1
6.25 TABLET ORAL 2 TIMES DAILY
Qty: 180 TABLET | Refills: 1 | Status: SHIPPED | OUTPATIENT
Start: 2022-01-01

## 2022-01-01 RX ORDER — FLUMAZENIL 0.1 MG/ML
0.2 INJECTION INTRAVENOUS AS NEEDED
Status: DISCONTINUED | OUTPATIENT
Start: 2022-01-01 | End: 2022-01-01 | Stop reason: HOSPADM

## 2022-01-01 RX ORDER — PROMETHAZINE HYDROCHLORIDE 12.5 MG/1
12.5 SUPPOSITORY RECTAL EVERY 4 HOURS PRN
Status: DISCONTINUED | OUTPATIENT
Start: 2022-01-01 | End: 2022-01-01 | Stop reason: HOSPADM

## 2022-01-01 RX ORDER — LORAZEPAM 2 MG/ML
0.5 CONCENTRATE ORAL
Status: DISCONTINUED | OUTPATIENT
Start: 2022-01-01 | End: 2022-01-01 | Stop reason: HOSPADM

## 2022-01-01 RX ORDER — LORAZEPAM 2 MG/ML
1 INJECTION INTRAMUSCULAR
Status: DISCONTINUED | OUTPATIENT
Start: 2022-01-01 | End: 2022-01-01

## 2022-01-01 RX ORDER — LORAZEPAM 2 MG/ML
2 CONCENTRATE ORAL
Status: DISPENSED | OUTPATIENT
Start: 2022-01-01 | End: 2022-01-01

## 2022-01-01 RX ORDER — MORPHINE SULFATE 10 MG/ML
6 INJECTION INTRAMUSCULAR; INTRAVENOUS; SUBCUTANEOUS
Status: CANCELLED | OUTPATIENT
Start: 2022-01-01 | End: 2022-01-01

## 2022-01-01 RX ORDER — LABETALOL HYDROCHLORIDE 5 MG/ML
5 INJECTION, SOLUTION INTRAVENOUS
Status: DISCONTINUED | OUTPATIENT
Start: 2022-01-01 | End: 2022-01-01 | Stop reason: HOSPADM

## 2022-01-01 RX ORDER — LORAZEPAM 2 MG/ML
1 CONCENTRATE ORAL
Status: DISPENSED | OUTPATIENT
Start: 2022-01-01 | End: 2022-01-01

## 2022-01-01 RX ORDER — NITROGLYCERIN 0.4 MG/1
0.4 TABLET SUBLINGUAL
Status: DISCONTINUED | OUTPATIENT
Start: 2022-01-01 | End: 2022-01-01

## 2022-01-01 RX ORDER — ACETAMINOPHEN 650 MG/1
650 SUPPOSITORY RECTAL EVERY 4 HOURS PRN
Status: DISCONTINUED | OUTPATIENT
Start: 2022-01-01 | End: 2022-01-01

## 2022-01-01 RX ORDER — ATROPINE SULFATE 10 MG/ML
2 SOLUTION/ DROPS OPHTHALMIC 2 TIMES DAILY PRN
Status: DISCONTINUED | OUTPATIENT
Start: 2022-01-01 | End: 2022-06-12 | Stop reason: HOSPADM

## 2022-01-01 RX ORDER — GLYCOPYRROLATE 0.2 MG/ML
0.4 INJECTION INTRAMUSCULAR; INTRAVENOUS
Status: DISCONTINUED | OUTPATIENT
Start: 2022-01-01 | End: 2022-06-12 | Stop reason: HOSPADM

## 2022-01-01 RX ORDER — LEVOTHYROXINE SODIUM 0.12 MG/1
125 TABLET ORAL
Status: DISCONTINUED | OUTPATIENT
Start: 2022-01-01 | End: 2022-01-01

## 2022-01-01 RX ORDER — PROMETHAZINE HYDROCHLORIDE 12.5 MG/1
12.5 SUPPOSITORY RECTAL EVERY 4 HOURS PRN
Status: CANCELLED | OUTPATIENT
Start: 2022-01-01

## 2022-01-01 RX ORDER — MOMETASONE FUROATE 1 MG/G
OINTMENT TOPICAL
COMMUNITY
Start: 2021-01-01

## 2022-01-01 RX ORDER — HYDRALAZINE HYDROCHLORIDE 20 MG/ML
5 INJECTION INTRAMUSCULAR; INTRAVENOUS
Status: DISCONTINUED | OUTPATIENT
Start: 2022-01-01 | End: 2022-01-01 | Stop reason: HOSPADM

## 2022-01-01 RX ORDER — NIFEDIPINE 30 MG/1
30 TABLET, EXTENDED RELEASE ORAL 2 TIMES DAILY
Qty: 180 TABLET | Refills: 1 | Status: SHIPPED | OUTPATIENT
Start: 2022-01-01

## 2022-01-01 RX ORDER — LORAZEPAM 2 MG/ML
2 CONCENTRATE ORAL
Status: CANCELLED | OUTPATIENT
Start: 2022-01-01 | End: 2022-01-01

## 2022-01-01 RX ORDER — ACETAMINOPHEN 325 MG/1
650 TABLET ORAL EVERY 4 HOURS PRN
Status: CANCELLED | OUTPATIENT
Start: 2022-01-01

## 2022-01-01 RX ORDER — SODIUM CHLORIDE, SODIUM LACTATE, POTASSIUM CHLORIDE, CALCIUM CHLORIDE 600; 310; 30; 20 MG/100ML; MG/100ML; MG/100ML; MG/100ML
100 INJECTION, SOLUTION INTRAVENOUS CONTINUOUS
Status: DISCONTINUED | OUTPATIENT
Start: 2022-01-01 | End: 2022-01-01

## 2022-01-01 RX ORDER — ROCURONIUM BROMIDE 10 MG/ML
INJECTION, SOLUTION INTRAVENOUS AS NEEDED
Status: DISCONTINUED | OUTPATIENT
Start: 2022-01-01 | End: 2022-01-01 | Stop reason: SURG

## 2022-01-01 RX ORDER — MORPHINE SULFATE 4 MG/ML
4 INJECTION, SOLUTION INTRAMUSCULAR; INTRAVENOUS
Status: DISCONTINUED | OUTPATIENT
Start: 2022-01-01 | End: 2022-01-01

## 2022-01-01 RX ORDER — LORAZEPAM 2 MG/ML
0.5 CONCENTRATE ORAL
Status: ACTIVE | OUTPATIENT
Start: 2022-01-01 | End: 2022-01-01

## 2022-01-01 RX ORDER — HYDROMORPHONE HCL 110MG/55ML
1.5 PATIENT CONTROLLED ANALGESIA SYRINGE INTRAVENOUS
Status: DISCONTINUED | OUTPATIENT
Start: 2022-01-01 | End: 2022-06-12 | Stop reason: HOSPADM

## 2022-01-01 RX ORDER — ERGOCALCIFEROL 1.25 MG/1
50000 CAPSULE ORAL
Status: DISCONTINUED | OUTPATIENT
Start: 2022-01-01 | End: 2022-01-01

## 2022-01-01 RX ORDER — NIFEDIPINE 30 MG/1
30 TABLET, EXTENDED RELEASE ORAL 2 TIMES DAILY
Status: DISCONTINUED | OUTPATIENT
Start: 2022-01-01 | End: 2022-01-01

## 2022-01-01 RX ORDER — ONDANSETRON 2 MG/ML
4 INJECTION INTRAMUSCULAR; INTRAVENOUS ONCE AS NEEDED
Status: DISCONTINUED | OUTPATIENT
Start: 2022-01-01 | End: 2022-01-01 | Stop reason: HOSPADM

## 2022-01-01 RX ORDER — POLYETHYLENE GLYCOL 3350 17 G/17G
17 POWDER, FOR SOLUTION ORAL DAILY
Status: DISCONTINUED | OUTPATIENT
Start: 2022-01-01 | End: 2022-01-01

## 2022-01-01 RX ORDER — DIPHENHYDRAMINE HYDROCHLORIDE 50 MG/ML
12.5 INJECTION INTRAMUSCULAR; INTRAVENOUS
Status: DISCONTINUED | OUTPATIENT
Start: 2022-01-01 | End: 2022-01-01 | Stop reason: HOSPADM

## 2022-01-01 RX ORDER — GLYCOPYRROLATE 0.2 MG/ML
0.2 INJECTION INTRAMUSCULAR; INTRAVENOUS
Status: DISCONTINUED | OUTPATIENT
Start: 2022-01-01 | End: 2022-06-12 | Stop reason: HOSPADM

## 2022-01-01 RX ORDER — ROSUVASTATIN CALCIUM 5 MG/1
2.5 TABLET, COATED ORAL DAILY
Status: DISCONTINUED | OUTPATIENT
Start: 2022-01-01 | End: 2022-01-01

## 2022-01-01 RX ORDER — TRANEXAMIC ACID 100 MG/ML
INJECTION, SOLUTION INTRAVENOUS AS NEEDED
Status: DISCONTINUED | OUTPATIENT
Start: 2022-01-01 | End: 2022-01-01 | Stop reason: SURG

## 2022-01-01 RX ORDER — ATROPINE SULFATE 10 MG/ML
2 SOLUTION/ DROPS OPHTHALMIC 2 TIMES DAILY PRN
Status: CANCELLED | OUTPATIENT
Start: 2022-01-01

## 2022-01-01 RX ORDER — MORPHINE SULFATE 2 MG/ML
1 INJECTION, SOLUTION INTRAMUSCULAR; INTRAVENOUS EVERY 4 HOURS PRN
Status: DISCONTINUED | OUTPATIENT
Start: 2022-01-01 | End: 2022-01-01

## 2022-01-01 RX ORDER — ONDANSETRON 2 MG/ML
INJECTION INTRAMUSCULAR; INTRAVENOUS AS NEEDED
Status: DISCONTINUED | OUTPATIENT
Start: 2022-01-01 | End: 2022-01-01 | Stop reason: SURG

## 2022-01-01 RX ORDER — OXYCODONE AND ACETAMINOPHEN 7.5; 325 MG/1; MG/1
1 TABLET ORAL EVERY 4 HOURS PRN
Status: DISCONTINUED | OUTPATIENT
Start: 2022-01-01 | End: 2022-01-01 | Stop reason: HOSPADM

## 2022-01-01 RX ORDER — HYDROCODONE BITARTRATE AND ACETAMINOPHEN 7.5; 325 MG/1; MG/1
2 TABLET ORAL EVERY 4 HOURS PRN
Status: DISCONTINUED | OUTPATIENT
Start: 2022-01-01 | End: 2022-01-01

## 2022-01-01 RX ORDER — ACETAMINOPHEN 160 MG/5ML
650 SOLUTION ORAL EVERY 4 HOURS PRN
Status: DISCONTINUED | OUTPATIENT
Start: 2022-01-01 | End: 2022-01-01 | Stop reason: HOSPADM

## 2022-01-01 RX ORDER — FENTANYL CITRATE 50 UG/ML
INJECTION, SOLUTION INTRAMUSCULAR; INTRAVENOUS AS NEEDED
Status: DISCONTINUED | OUTPATIENT
Start: 2022-01-01 | End: 2022-01-01 | Stop reason: SURG

## 2022-01-01 RX ORDER — LORAZEPAM 2 MG/ML
1 INJECTION INTRAMUSCULAR
Status: DISCONTINUED | OUTPATIENT
Start: 2022-01-01 | End: 2022-06-12 | Stop reason: HOSPADM

## 2022-01-01 RX ORDER — ONDANSETRON 2 MG/ML
4 INJECTION INTRAMUSCULAR; INTRAVENOUS EVERY 6 HOURS PRN
Status: DISCONTINUED | OUTPATIENT
Start: 2022-01-01 | End: 2022-01-01 | Stop reason: SDUPTHER

## 2022-01-01 RX ORDER — NEOSTIGMINE METHYLSULFATE 0.5 MG/ML
INJECTION, SOLUTION INTRAVENOUS AS NEEDED
Status: DISCONTINUED | OUTPATIENT
Start: 2022-01-01 | End: 2022-01-01 | Stop reason: SURG

## 2022-01-01 RX ORDER — SODIUM CHLORIDE 0.9 % (FLUSH) 0.9 %
10 SYRINGE (ML) INJECTION AS NEEDED
Status: DISCONTINUED | OUTPATIENT
Start: 2022-01-01 | End: 2022-01-01

## 2022-01-01 RX ORDER — CEFAZOLIN SODIUM 2 G/100ML
2 INJECTION, SOLUTION INTRAVENOUS ONCE
Status: COMPLETED | OUTPATIENT
Start: 2022-01-01 | End: 2022-01-01

## 2022-01-01 RX ORDER — LORAZEPAM 2 MG/ML
2 CONCENTRATE ORAL
Status: DISCONTINUED | OUTPATIENT
Start: 2022-01-01 | End: 2022-06-12 | Stop reason: HOSPADM

## 2022-01-01 RX ORDER — HYDROMORPHONE HYDROCHLORIDE 1 MG/ML
0.5 INJECTION, SOLUTION INTRAMUSCULAR; INTRAVENOUS; SUBCUTANEOUS
Status: DISCONTINUED | OUTPATIENT
Start: 2022-01-01 | End: 2022-01-01 | Stop reason: HOSPADM

## 2022-01-01 RX ORDER — BISACODYL 10 MG
10 SUPPOSITORY, RECTAL RECTAL DAILY PRN
Status: DISCONTINUED | OUTPATIENT
Start: 2022-01-01 | End: 2022-01-01

## 2022-01-01 RX ORDER — LORAZEPAM 2 MG/ML
2 INJECTION INTRAMUSCULAR
Status: DISPENSED | OUTPATIENT
Start: 2022-01-01 | End: 2022-01-01

## 2022-01-01 RX ORDER — LEVOTHYROXINE SODIUM 88 UG/1
88 TABLET ORAL
Status: DISCONTINUED | OUTPATIENT
Start: 2022-01-01 | End: 2022-01-01

## 2022-01-01 RX ORDER — LORAZEPAM 2 MG/ML
1 CONCENTRATE ORAL
Status: CANCELLED | OUTPATIENT
Start: 2022-01-01 | End: 2022-01-01

## 2022-01-01 RX ORDER — AMOXICILLIN 250 MG
2 CAPSULE ORAL 2 TIMES DAILY
Status: DISCONTINUED | OUTPATIENT
Start: 2022-01-01 | End: 2022-01-01

## 2022-01-01 RX ORDER — PROMETHAZINE HYDROCHLORIDE 6.25 MG/5ML
12.5 SYRUP ORAL EVERY 4 HOURS PRN
Status: CANCELLED | OUTPATIENT
Start: 2022-01-01

## 2022-01-01 RX ORDER — SODIUM CHLORIDE 9 MG/ML
75 INJECTION, SOLUTION INTRAVENOUS CONTINUOUS
Status: DISCONTINUED | OUTPATIENT
Start: 2022-01-01 | End: 2022-01-01

## 2022-01-01 RX ORDER — ATROPINE SULFATE 10 MG/ML
2 SOLUTION/ DROPS OPHTHALMIC 2 TIMES DAILY PRN
Status: DISCONTINUED | OUTPATIENT
Start: 2022-01-01 | End: 2022-01-01 | Stop reason: HOSPADM

## 2022-01-01 RX ORDER — MORPHINE SULFATE 2 MG/ML
6 INJECTION, SOLUTION INTRAMUSCULAR; INTRAVENOUS
Status: DISCONTINUED | OUTPATIENT
Start: 2022-01-01 | End: 2022-01-01 | Stop reason: HOSPADM

## 2022-01-01 RX ORDER — NALOXONE HCL 0.4 MG/ML
0.1 VIAL (ML) INJECTION
Status: DISCONTINUED | OUTPATIENT
Start: 2022-01-01 | End: 2022-01-01

## 2022-01-01 RX ORDER — BISACODYL 10 MG
10 SUPPOSITORY, RECTAL RECTAL DAILY PRN
Status: DISCONTINUED | OUTPATIENT
Start: 2022-01-01 | End: 2022-01-01 | Stop reason: SDUPTHER

## 2022-01-01 RX ORDER — SODIUM CHLORIDE 0.9 % (FLUSH) 0.9 %
1-10 SYRINGE (ML) INJECTION AS NEEDED
Status: DISCONTINUED | OUTPATIENT
Start: 2022-01-01 | End: 2022-01-01

## 2022-01-01 RX ORDER — HYDROMORPHONE HCL 110MG/55ML
1.5 PATIENT CONTROLLED ANALGESIA SYRINGE INTRAVENOUS
Status: DISPENSED | OUTPATIENT
Start: 2022-01-01 | End: 2022-01-01

## 2022-01-01 RX ORDER — HYDROMORPHONE HCL 110MG/55ML
1.5 PATIENT CONTROLLED ANALGESIA SYRINGE INTRAVENOUS
Status: CANCELLED | OUTPATIENT
Start: 2022-01-01 | End: 2022-01-01

## 2022-01-01 RX ORDER — OLANZAPINE 10 MG/1
5 INJECTION, POWDER, LYOPHILIZED, FOR SOLUTION INTRAMUSCULAR EVERY 6 HOURS PRN
Status: DISCONTINUED | OUTPATIENT
Start: 2022-01-01 | End: 2022-01-01

## 2022-01-01 RX ORDER — MORPHINE SULFATE 20 MG/ML
10 SOLUTION ORAL
Status: CANCELLED | OUTPATIENT
Start: 2022-01-01 | End: 2022-01-01

## 2022-01-01 RX ORDER — MORPHINE SULFATE 2 MG/ML
1 INJECTION, SOLUTION INTRAMUSCULAR; INTRAVENOUS ONCE
Status: COMPLETED | OUTPATIENT
Start: 2022-01-01 | End: 2022-01-01

## 2022-01-01 RX ORDER — FOLIC ACID 1 MG/1
1 TABLET ORAL DAILY
Status: DISCONTINUED | OUTPATIENT
Start: 2022-01-01 | End: 2022-01-01

## 2022-01-01 RX ORDER — OLANZAPINE 2.5 MG/1
2.5 TABLET ORAL 2 TIMES DAILY
Status: DISCONTINUED | OUTPATIENT
Start: 2022-01-01 | End: 2022-01-01

## 2022-01-01 RX ORDER — HYDROMORPHONE HYDROCHLORIDE 1 MG/ML
0.5 INJECTION, SOLUTION INTRAMUSCULAR; INTRAVENOUS; SUBCUTANEOUS
Status: DISCONTINUED | OUTPATIENT
Start: 2022-01-01 | End: 2022-01-01

## 2022-01-01 RX ADMIN — ASPIRIN 81 MG: 81 TABLET, COATED ORAL at 22:07

## 2022-01-01 RX ADMIN — LORAZEPAM 2 MG: 2 INJECTION INTRAMUSCULAR at 04:52

## 2022-01-01 RX ADMIN — HYDROMORPHONE HYDROCHLORIDE 1.5 MG: 2 INJECTION, SOLUTION INTRAMUSCULAR; INTRAVENOUS; SUBCUTANEOUS at 12:14

## 2022-01-01 RX ADMIN — LEVOTHYROXINE SODIUM 125 MCG: 0.12 TABLET ORAL at 06:05

## 2022-01-01 RX ADMIN — GLYCOPYRROLATE 0.4 MG: 0.2 INJECTION INTRAMUSCULAR; INTRAVENOUS at 17:58

## 2022-01-01 RX ADMIN — LORAZEPAM 2 MG: 2 INJECTION INTRAMUSCULAR at 13:15

## 2022-01-01 RX ADMIN — GLYCOPYRROLATE 0.4 MG: 0.2 INJECTION INTRAMUSCULAR; INTRAVENOUS at 00:38

## 2022-01-01 RX ADMIN — HYDROMORPHONE HYDROCHLORIDE 1.5 MG: 2 INJECTION, SOLUTION INTRAMUSCULAR; INTRAVENOUS; SUBCUTANEOUS at 04:51

## 2022-01-01 RX ADMIN — LORAZEPAM 2 MG: 2 INJECTION INTRAMUSCULAR at 16:40

## 2022-01-01 RX ADMIN — MORPHINE SULFATE 4 MG: 2 INJECTION, SOLUTION INTRAMUSCULAR; INTRAVENOUS at 15:51

## 2022-01-01 RX ADMIN — GLYCOPYRROLATE 0.4 MG: 0.2 INJECTION INTRAMUSCULAR; INTRAVENOUS at 12:34

## 2022-01-01 RX ADMIN — LORAZEPAM 2 MG: 2 INJECTION INTRAMUSCULAR at 17:34

## 2022-01-01 RX ADMIN — GLYCOPYRROLATE 0.4 MG: 0.2 INJECTION INTRAMUSCULAR; INTRAVENOUS at 09:01

## 2022-01-01 RX ADMIN — HYDROMORPHONE HYDROCHLORIDE 1.5 MG: 2 INJECTION, SOLUTION INTRAMUSCULAR; INTRAVENOUS; SUBCUTANEOUS at 05:13

## 2022-01-01 RX ADMIN — TRANEXAMIC ACID 1000 MG: 1 INJECTION, SOLUTION INTRAVENOUS at 08:23

## 2022-01-01 RX ADMIN — NIFEDIPINE 30 MG: 30 TABLET, FILM COATED, EXTENDED RELEASE ORAL at 09:17

## 2022-01-01 RX ADMIN — GLYCOPYRROLATE 0.4 MG: 0.2 INJECTION INTRAMUSCULAR; INTRAVENOUS at 17:10

## 2022-01-01 RX ADMIN — POLYETHYLENE GLYCOL 3350 17 G: 17 POWDER, FOR SOLUTION ORAL at 08:56

## 2022-01-01 RX ADMIN — OLANZAPINE 2.5 MG: 2.5 TABLET ORAL at 08:17

## 2022-01-01 RX ADMIN — HYDROMORPHONE HYDROCHLORIDE 1.5 MG: 2 INJECTION, SOLUTION INTRAMUSCULAR; INTRAVENOUS; SUBCUTANEOUS at 00:43

## 2022-01-01 RX ADMIN — CEFAZOLIN SODIUM 2 G: 2 INJECTION, SOLUTION INTRAVENOUS at 00:40

## 2022-01-01 RX ADMIN — GLYCOPYRROLATE 0.4 MG: 0.2 INJECTION INTRAMUSCULAR; INTRAVENOUS at 21:11

## 2022-01-01 RX ADMIN — NIFEDIPINE 30 MG: 30 TABLET, FILM COATED, EXTENDED RELEASE ORAL at 10:02

## 2022-01-01 RX ADMIN — GLYCOPYRROLATE 0.4 MG: 0.2 INJECTION INTRAMUSCULAR; INTRAVENOUS at 21:27

## 2022-01-01 RX ADMIN — HYDROMORPHONE HYDROCHLORIDE 1 MG: 1 INJECTION, SOLUTION INTRAMUSCULAR; INTRAVENOUS; SUBCUTANEOUS at 00:47

## 2022-01-01 RX ADMIN — LORAZEPAM 2 MG: 2 INJECTION INTRAMUSCULAR at 16:49

## 2022-01-01 RX ADMIN — HYDROMORPHONE HYDROCHLORIDE 1 MG: 1 INJECTION, SOLUTION INTRAMUSCULAR; INTRAVENOUS; SUBCUTANEOUS at 04:54

## 2022-01-01 RX ADMIN — GLYCOPYRROLATE 0.4 MG: 0.2 INJECTION INTRAMUSCULAR; INTRAVENOUS at 21:12

## 2022-01-01 RX ADMIN — HYDROMORPHONE HYDROCHLORIDE 1.5 MG: 2 INJECTION, SOLUTION INTRAMUSCULAR; INTRAVENOUS; SUBCUTANEOUS at 08:53

## 2022-01-01 RX ADMIN — GLYCOPYRROLATE 0.4 MG: 0.2 INJECTION INTRAMUSCULAR; INTRAVENOUS at 16:44

## 2022-01-01 RX ADMIN — LORAZEPAM 2 MG: 2 INJECTION INTRAMUSCULAR at 09:08

## 2022-01-01 RX ADMIN — DOCUSATE SODIUM 50MG AND SENNOSIDES 8.6MG 2 TABLET: 8.6; 5 TABLET, FILM COATED ORAL at 08:17

## 2022-01-01 RX ADMIN — ROSUVASTATIN CALCIUM 2.5 MG: 5 TABLET, FILM COATED ORAL at 08:56

## 2022-01-01 RX ADMIN — GLYCOPYRROLATE 0.4 MG: 0.2 INJECTION INTRAMUSCULAR; INTRAVENOUS at 05:00

## 2022-01-01 RX ADMIN — DONEPEZIL HYDROCHLORIDE 5 MG: 5 TABLET, FILM COATED ORAL at 22:08

## 2022-01-01 RX ADMIN — HYDROMORPHONE HYDROCHLORIDE 1.5 MG: 2 INJECTION, SOLUTION INTRAMUSCULAR; INTRAVENOUS; SUBCUTANEOUS at 21:28

## 2022-01-01 RX ADMIN — HYDROMORPHONE HYDROCHLORIDE 1.5 MG: 2 INJECTION, SOLUTION INTRAMUSCULAR; INTRAVENOUS; SUBCUTANEOUS at 12:34

## 2022-01-01 RX ADMIN — GLYCOPYRROLATE 0.4 MG: 0.2 INJECTION INTRAMUSCULAR; INTRAVENOUS at 21:20

## 2022-01-01 RX ADMIN — GLYCOPYRROLATE 0.4 MG: 0.2 INJECTION INTRAMUSCULAR; INTRAVENOUS at 05:17

## 2022-01-01 RX ADMIN — HYDROMORPHONE HYDROCHLORIDE 1.5 MG: 2 INJECTION, SOLUTION INTRAMUSCULAR; INTRAVENOUS; SUBCUTANEOUS at 08:46

## 2022-01-01 RX ADMIN — HYDROMORPHONE HYDROCHLORIDE 1.5 MG: 2 INJECTION, SOLUTION INTRAMUSCULAR; INTRAVENOUS; SUBCUTANEOUS at 09:57

## 2022-01-01 RX ADMIN — HYDROMORPHONE HYDROCHLORIDE 1.5 MG: 2 INJECTION, SOLUTION INTRAMUSCULAR; INTRAVENOUS; SUBCUTANEOUS at 00:24

## 2022-01-01 RX ADMIN — HYDROMORPHONE HYDROCHLORIDE 1.5 MG: 2 INJECTION, SOLUTION INTRAMUSCULAR; INTRAVENOUS; SUBCUTANEOUS at 05:17

## 2022-01-01 RX ADMIN — ASPIRIN 81 MG: 81 TABLET, COATED ORAL at 20:19

## 2022-01-01 RX ADMIN — DOCUSATE SODIUM 50MG AND SENNOSIDES 8.6MG 2 TABLET: 8.6; 5 TABLET, FILM COATED ORAL at 21:37

## 2022-01-01 RX ADMIN — LORAZEPAM 2 MG: 2 INJECTION INTRAMUSCULAR at 09:01

## 2022-01-01 RX ADMIN — HYDROMORPHONE HYDROCHLORIDE 1 MG: 1 INJECTION, SOLUTION INTRAMUSCULAR; INTRAVENOUS; SUBCUTANEOUS at 17:34

## 2022-01-01 RX ADMIN — GLYCOPYRROLATE 0.4 MG: 0.2 INJECTION INTRAMUSCULAR; INTRAVENOUS at 04:45

## 2022-01-01 RX ADMIN — LORAZEPAM 2 MG: 2 INJECTION INTRAMUSCULAR at 05:18

## 2022-01-01 RX ADMIN — LORAZEPAM 2 MG: 2 INJECTION INTRAMUSCULAR at 08:53

## 2022-01-01 RX ADMIN — ACETAMINOPHEN 1000 MG: 500 TABLET ORAL at 16:03

## 2022-01-01 RX ADMIN — MORPHINE SULFATE 4 MG: 2 INJECTION, SOLUTION INTRAMUSCULAR; INTRAVENOUS at 13:12

## 2022-01-01 RX ADMIN — MORPHINE SULFATE 4 MG: 2 INJECTION, SOLUTION INTRAMUSCULAR; INTRAVENOUS at 12:27

## 2022-01-01 RX ADMIN — CARVEDILOL 6.25 MG: 6.25 TABLET, FILM COATED ORAL at 03:58

## 2022-01-01 RX ADMIN — SODIUM CHLORIDE, POTASSIUM CHLORIDE, SODIUM LACTATE AND CALCIUM CHLORIDE 100 ML/HR: 600; 310; 30; 20 INJECTION, SOLUTION INTRAVENOUS at 22:09

## 2022-01-01 RX ADMIN — HYDROMORPHONE HYDROCHLORIDE 0.5 MG: 1 INJECTION, SOLUTION INTRAMUSCULAR; INTRAVENOUS; SUBCUTANEOUS at 20:18

## 2022-01-01 RX ADMIN — LORAZEPAM 2 MG: 2 INJECTION INTRAMUSCULAR at 20:29

## 2022-01-01 RX ADMIN — HYDROMORPHONE HYDROCHLORIDE 1.5 MG: 2 INJECTION, SOLUTION INTRAMUSCULAR; INTRAVENOUS; SUBCUTANEOUS at 12:55

## 2022-01-01 RX ADMIN — HYDROMORPHONE HYDROCHLORIDE 1.5 MG: 2 INJECTION, SOLUTION INTRAMUSCULAR; INTRAVENOUS; SUBCUTANEOUS at 17:32

## 2022-01-01 RX ADMIN — ALLOPURINOL 100 MG: 100 TABLET ORAL at 08:00

## 2022-01-01 RX ADMIN — GLYCOPYRROLATE 0.4 MG: 0.2 INJECTION INTRAMUSCULAR; INTRAVENOUS at 12:25

## 2022-01-01 RX ADMIN — CARVEDILOL 6.25 MG: 6.25 TABLET, FILM COATED ORAL at 21:19

## 2022-01-01 RX ADMIN — HYDROMORPHONE HYDROCHLORIDE 1.5 MG: 2 INJECTION, SOLUTION INTRAMUSCULAR; INTRAVENOUS; SUBCUTANEOUS at 00:38

## 2022-01-01 RX ADMIN — GLYCOPYRROLATE 0.4 MG: 0.2 INJECTION INTRAMUSCULAR; INTRAVENOUS at 09:18

## 2022-01-01 RX ADMIN — HYDROMORPHONE HYDROCHLORIDE 1.5 MG: 2 INJECTION, SOLUTION INTRAMUSCULAR; INTRAVENOUS; SUBCUTANEOUS at 09:40

## 2022-01-01 RX ADMIN — SODIUM CHLORIDE, POTASSIUM CHLORIDE, SODIUM LACTATE AND CALCIUM CHLORIDE 100 ML/HR: 600; 310; 30; 20 INJECTION, SOLUTION INTRAVENOUS at 12:47

## 2022-01-01 RX ADMIN — MORPHINE SULFATE 4 MG: 4 INJECTION, SOLUTION INTRAMUSCULAR; INTRAVENOUS at 00:41

## 2022-01-01 RX ADMIN — LORAZEPAM 1 MG: 2 CONCENTRATE ORAL at 01:17

## 2022-01-01 RX ADMIN — CEFAZOLIN SODIUM 2 G: 2 INJECTION, SOLUTION INTRAVENOUS at 07:38

## 2022-01-01 RX ADMIN — HYDROMORPHONE HYDROCHLORIDE 1.5 MG: 2 INJECTION, SOLUTION INTRAMUSCULAR; INTRAVENOUS; SUBCUTANEOUS at 00:35

## 2022-01-01 RX ADMIN — GLYCOPYRROLATE 0.4 MG: 0.2 INJECTION INTRAMUSCULAR; INTRAVENOUS at 08:53

## 2022-01-01 RX ADMIN — NIFEDIPINE 30 MG: 30 TABLET, FILM COATED, EXTENDED RELEASE ORAL at 08:03

## 2022-01-01 RX ADMIN — GLYCOPYRROLATE 0.4 MG: 0.2 INJECTION INTRAMUSCULAR; INTRAVENOUS at 16:42

## 2022-01-01 RX ADMIN — MORPHINE SULFATE 4 MG: 2 INJECTION, SOLUTION INTRAMUSCULAR; INTRAVENOUS at 12:40

## 2022-01-01 RX ADMIN — OLANZAPINE 2.5 MG: 10 INJECTION, POWDER, FOR SOLUTION INTRAMUSCULAR at 14:46

## 2022-01-01 RX ADMIN — LORAZEPAM 2 MG: 2 INJECTION INTRAMUSCULAR; INTRAVENOUS at 00:35

## 2022-01-01 RX ADMIN — Medication 10 ML: at 21:39

## 2022-01-01 RX ADMIN — MORPHINE SULFATE 4 MG: 4 INJECTION, SOLUTION INTRAMUSCULAR; INTRAVENOUS at 04:21

## 2022-01-01 RX ADMIN — GLYCOPYRROLATE 0.4 MG: 0.2 INJECTION INTRAMUSCULAR; INTRAVENOUS at 13:15

## 2022-01-01 RX ADMIN — LORAZEPAM 1 MG: 2 CONCENTRATE ORAL at 16:43

## 2022-01-01 RX ADMIN — LORAZEPAM 2 MG: 2 INJECTION INTRAMUSCULAR at 20:49

## 2022-01-01 RX ADMIN — GLYCOPYRROLATE 0.4 MG: 0.2 INJECTION INTRAMUSCULAR; INTRAVENOUS at 17:21

## 2022-01-01 RX ADMIN — GLYCOPYRROLATE 0.4 MG: 0.2 INJECTION INTRAMUSCULAR; INTRAVENOUS at 05:13

## 2022-01-01 RX ADMIN — MORPHINE SULFATE 1 MG: 2 INJECTION, SOLUTION INTRAMUSCULAR; INTRAVENOUS at 22:31

## 2022-01-01 RX ADMIN — HYDROMORPHONE HYDROCHLORIDE 1.5 MG: 2 INJECTION, SOLUTION INTRAMUSCULAR; INTRAVENOUS; SUBCUTANEOUS at 20:49

## 2022-01-01 RX ADMIN — GLYCOPYRROLATE 0.4 MG: 0.2 INJECTION INTRAMUSCULAR; INTRAVENOUS at 09:27

## 2022-01-01 RX ADMIN — NIFEDIPINE 30 MG: 30 TABLET, FILM COATED, EXTENDED RELEASE ORAL at 08:56

## 2022-01-01 RX ADMIN — MORPHINE SULFATE 4 MG: 4 INJECTION, SOLUTION INTRAMUSCULAR; INTRAVENOUS at 16:43

## 2022-01-01 RX ADMIN — GLYCOPYRROLATE 0.4 MG: 0.2 INJECTION INTRAMUSCULAR; INTRAVENOUS at 20:57

## 2022-01-01 RX ADMIN — LORAZEPAM 2 MG: 2 INJECTION INTRAMUSCULAR; INTRAVENOUS at 21:28

## 2022-01-01 RX ADMIN — ASPIRIN 81 MG: 81 TABLET, COATED ORAL at 21:19

## 2022-01-01 RX ADMIN — OLANZAPINE 2.5 MG: 2.5 TABLET ORAL at 21:37

## 2022-01-01 RX ADMIN — Medication 10 ML: at 21:19

## 2022-01-01 RX ADMIN — ACETAMINOPHEN 1000 MG: 500 TABLET ORAL at 20:19

## 2022-01-01 RX ADMIN — GLYCOPYRROLATE 0.4 MG: 0.2 INJECTION INTRAMUSCULAR; INTRAVENOUS at 08:25

## 2022-01-01 RX ADMIN — ASPIRIN 81 MG: 81 TABLET, COATED ORAL at 08:00

## 2022-01-01 RX ADMIN — ACETAMINOPHEN 1000 MG: 500 TABLET ORAL at 08:17

## 2022-01-01 RX ADMIN — LORAZEPAM 2 MG: 2 INJECTION INTRAMUSCULAR at 04:39

## 2022-01-01 RX ADMIN — LORAZEPAM 2 MG: 2 INJECTION INTRAMUSCULAR at 09:40

## 2022-01-01 RX ADMIN — LORAZEPAM 2 MG: 2 INJECTION INTRAMUSCULAR at 16:44

## 2022-01-01 RX ADMIN — LORAZEPAM 2 MG: 2 INJECTION INTRAMUSCULAR; INTRAVENOUS at 16:43

## 2022-01-01 RX ADMIN — HYDROMORPHONE HYDROCHLORIDE 1.5 MG: 2 INJECTION, SOLUTION INTRAMUSCULAR; INTRAVENOUS; SUBCUTANEOUS at 04:41

## 2022-01-01 RX ADMIN — GLYCOPYRROLATE 0.4 MG: 0.2 INJECTION INTRAMUSCULAR; INTRAVENOUS at 09:08

## 2022-01-01 RX ADMIN — GLYCOPYRROLATE 0.4 MG: 0.2 INJECTION INTRAMUSCULAR; INTRAVENOUS at 17:31

## 2022-01-01 RX ADMIN — DOCUSATE SODIUM 50MG AND SENNOSIDES 8.6MG 2 TABLET: 8.6; 5 TABLET, FILM COATED ORAL at 20:19

## 2022-01-01 RX ADMIN — DOCUSATE SODIUM 50MG AND SENNOSIDES 8.6MG 2 TABLET: 8.6; 5 TABLET, FILM COATED ORAL at 08:56

## 2022-01-01 RX ADMIN — LORAZEPAM 2 MG: 2 INJECTION INTRAMUSCULAR at 14:15

## 2022-01-01 RX ADMIN — CARVEDILOL 6.25 MG: 6.25 TABLET, FILM COATED ORAL at 08:56

## 2022-01-01 RX ADMIN — CARVEDILOL 6.25 MG: 6.25 TABLET, FILM COATED ORAL at 10:02

## 2022-01-01 RX ADMIN — HYDROMORPHONE HYDROCHLORIDE 1.5 MG: 2 INJECTION, SOLUTION INTRAMUSCULAR; INTRAVENOUS; SUBCUTANEOUS at 01:00

## 2022-01-01 RX ADMIN — MORPHINE SULFATE 4 MG: 2 INJECTION, SOLUTION INTRAMUSCULAR; INTRAVENOUS at 16:43

## 2022-01-01 RX ADMIN — CEFAZOLIN SODIUM 2 G: 2 INJECTION, SOLUTION INTRAVENOUS at 16:00

## 2022-01-01 RX ADMIN — LORAZEPAM 2 MG: 2 INJECTION INTRAMUSCULAR at 17:52

## 2022-01-01 RX ADMIN — LORAZEPAM 2 MG: 2 INJECTION INTRAMUSCULAR at 00:21

## 2022-01-01 RX ADMIN — MORPHINE SULFATE 4 MG: 2 INJECTION, SOLUTION INTRAMUSCULAR; INTRAVENOUS at 04:07

## 2022-01-01 RX ADMIN — LORAZEPAM 2 MG: 2 INJECTION INTRAMUSCULAR at 12:47

## 2022-01-01 RX ADMIN — GLYCOPYRROLATE 0.4 MG: 0.2 INJECTION INTRAMUSCULAR; INTRAVENOUS at 00:21

## 2022-01-01 RX ADMIN — LORAZEPAM 2 MG: 2 INJECTION INTRAMUSCULAR at 00:37

## 2022-01-01 RX ADMIN — DONEPEZIL HYDROCHLORIDE 5 MG: 5 TABLET, FILM COATED ORAL at 02:09

## 2022-01-01 RX ADMIN — LORAZEPAM 1 MG: 2 CONCENTRATE ORAL at 09:10

## 2022-01-01 RX ADMIN — LORAZEPAM 2 MG: 2 INJECTION INTRAMUSCULAR; INTRAVENOUS at 20:57

## 2022-01-01 RX ADMIN — GLYCOPYRROLATE 0.4 MG: 0.2 INJECTION INTRAMUSCULAR; INTRAVENOUS at 04:41

## 2022-01-01 RX ADMIN — LEVOTHYROXINE SODIUM 88 MCG: 0.09 TABLET ORAL at 06:01

## 2022-01-01 RX ADMIN — GLYCOPYRROLATE 0.4 MG: 0.2 INJECTION INTRAMUSCULAR; INTRAVENOUS at 17:52

## 2022-01-01 RX ADMIN — ASPIRIN 81 MG: 81 TABLET, COATED ORAL at 08:04

## 2022-01-01 RX ADMIN — ONDANSETRON 4 MG: 2 INJECTION INTRAMUSCULAR; INTRAVENOUS at 09:08

## 2022-01-01 RX ADMIN — LORAZEPAM 1 MG: 2 CONCENTRATE ORAL at 04:07

## 2022-01-01 RX ADMIN — GLYCOPYRROLATE 0.4 MG: 0.2 INJECTION INTRAMUSCULAR; INTRAVENOUS at 13:26

## 2022-01-01 RX ADMIN — FENTANYL CITRATE 25 MCG: 0.05 INJECTION, SOLUTION INTRAMUSCULAR; INTRAVENOUS at 08:54

## 2022-01-01 RX ADMIN — LORAZEPAM 1 MG: 2 CONCENTRATE ORAL at 12:28

## 2022-01-01 RX ADMIN — GLYCOPYRROLATE 0.4 MG: 0.2 INJECTION INTRAMUSCULAR; INTRAVENOUS at 08:58

## 2022-01-01 RX ADMIN — GLYCOPYRROLATE 0.4 MG: 0.2 INJECTION INTRAMUSCULAR; INTRAVENOUS at 12:40

## 2022-01-01 RX ADMIN — HYDROMORPHONE HYDROCHLORIDE 1.5 MG: 2 INJECTION, SOLUTION INTRAMUSCULAR; INTRAVENOUS; SUBCUTANEOUS at 00:45

## 2022-01-01 RX ADMIN — LORAZEPAM 2 MG: 2 INJECTION INTRAMUSCULAR at 00:25

## 2022-01-01 RX ADMIN — HYDROMORPHONE HYDROCHLORIDE 1.5 MG: 2 INJECTION, SOLUTION INTRAMUSCULAR; INTRAVENOUS; SUBCUTANEOUS at 16:37

## 2022-01-01 RX ADMIN — LORAZEPAM 2 MG: 2 INJECTION INTRAMUSCULAR; INTRAVENOUS at 08:55

## 2022-01-01 RX ADMIN — LIDOCAINE HYDROCHLORIDE 50 MG: 20 INJECTION, SOLUTION INFILTRATION; PERINEURAL at 07:49

## 2022-01-01 RX ADMIN — HYDROMORPHONE HYDROCHLORIDE 1.5 MG: 2 INJECTION, SOLUTION INTRAMUSCULAR; INTRAVENOUS; SUBCUTANEOUS at 20:30

## 2022-01-01 RX ADMIN — LORAZEPAM 2 MG: 2 INJECTION INTRAMUSCULAR; INTRAVENOUS at 16:42

## 2022-01-01 RX ADMIN — GLYCOPYRROLATE 0.4 MG: 0.2 INJECTION INTRAMUSCULAR; INTRAVENOUS at 08:40

## 2022-01-01 RX ADMIN — GLYCOPYRROLATE 0.4 MG: 0.2 INJECTION INTRAMUSCULAR; INTRAVENOUS at 20:30

## 2022-01-01 RX ADMIN — EPHEDRINE SULFATE 5 MG: 50 INJECTION INTRAVENOUS at 08:30

## 2022-01-01 RX ADMIN — LORAZEPAM 1 MG: 2 CONCENTRATE ORAL at 04:21

## 2022-01-01 RX ADMIN — LORAZEPAM 2 MG: 2 INJECTION INTRAMUSCULAR at 04:42

## 2022-01-01 RX ADMIN — MORPHINE SULFATE 4 MG: 2 INJECTION, SOLUTION INTRAMUSCULAR; INTRAVENOUS at 20:07

## 2022-01-01 RX ADMIN — DOCUSATE SODIUM 50MG AND SENNOSIDES 8.6MG 2 TABLET: 8.6; 5 TABLET, FILM COATED ORAL at 09:17

## 2022-01-01 RX ADMIN — LEVOTHYROXINE SODIUM 125 MCG: 0.12 TABLET ORAL at 06:44

## 2022-01-01 RX ADMIN — ASPIRIN 81 MG: 81 TABLET, COATED ORAL at 10:02

## 2022-01-01 RX ADMIN — HYDROMORPHONE HYDROCHLORIDE 0.5 MG: 1 INJECTION, SOLUTION INTRAMUSCULAR; INTRAVENOUS; SUBCUTANEOUS at 12:16

## 2022-01-01 RX ADMIN — HYDROMORPHONE HYDROCHLORIDE 1.5 MG: 2 INJECTION, SOLUTION INTRAMUSCULAR; INTRAVENOUS; SUBCUTANEOUS at 00:44

## 2022-01-01 RX ADMIN — ACETAMINOPHEN 1000 MG: 500 TABLET ORAL at 21:18

## 2022-01-01 RX ADMIN — ROSUVASTATIN CALCIUM 2.5 MG: 5 TABLET, FILM COATED ORAL at 08:04

## 2022-01-01 RX ADMIN — HYDROMORPHONE HYDROCHLORIDE 1.5 MG: 2 INJECTION, SOLUTION INTRAMUSCULAR; INTRAVENOUS; SUBCUTANEOUS at 20:37

## 2022-01-01 RX ADMIN — GLYCOPYRROLATE 0.4 MG: 0.2 INJECTION INTRAMUSCULAR; INTRAVENOUS at 13:13

## 2022-01-01 RX ADMIN — SCOPALAMINE 1 PATCH: 1 PATCH, EXTENDED RELEASE TRANSDERMAL at 04:50

## 2022-01-01 RX ADMIN — LORAZEPAM 2 MG: 2 INJECTION INTRAMUSCULAR at 20:30

## 2022-01-01 RX ADMIN — LORAZEPAM 2 MG: 2 INJECTION INTRAMUSCULAR at 12:59

## 2022-01-01 RX ADMIN — LORAZEPAM 2 MG: 2 INJECTION INTRAMUSCULAR; INTRAVENOUS at 13:36

## 2022-01-01 RX ADMIN — GLYCOPYRROLATE 0.4 MG: 0.2 INJECTION INTRAMUSCULAR; INTRAVENOUS at 04:47

## 2022-01-01 RX ADMIN — SUGAMMADEX 200 MG: 100 INJECTION, SOLUTION INTRAVENOUS at 09:21

## 2022-01-01 RX ADMIN — HYDROMORPHONE HYDROCHLORIDE 1 MG: 1 INJECTION, SOLUTION INTRAMUSCULAR; INTRAVENOUS; SUBCUTANEOUS at 13:07

## 2022-01-01 RX ADMIN — LORAZEPAM 2 MG: 2 INJECTION INTRAMUSCULAR; INTRAVENOUS at 05:13

## 2022-01-01 RX ADMIN — LORAZEPAM 2 MG: 2 INJECTION INTRAMUSCULAR at 20:57

## 2022-01-01 RX ADMIN — ACETAMINOPHEN 1000 MG: 500 TABLET ORAL at 20:25

## 2022-01-01 RX ADMIN — MORPHINE SULFATE 4 MG: 4 INJECTION, SOLUTION INTRAMUSCULAR; INTRAVENOUS at 08:07

## 2022-01-01 RX ADMIN — LORAZEPAM 2 MG: 2 INJECTION INTRAMUSCULAR at 04:44

## 2022-01-01 RX ADMIN — FOLIC ACID 1 MG: 1 TABLET ORAL at 14:16

## 2022-01-01 RX ADMIN — HYDROMORPHONE HYDROCHLORIDE 1.5 MG: 2 INJECTION, SOLUTION INTRAMUSCULAR; INTRAVENOUS; SUBCUTANEOUS at 21:20

## 2022-01-01 RX ADMIN — GLYCOPYRROLATE 0.4 MG: 0.2 INJECTION INTRAMUSCULAR; INTRAVENOUS at 20:41

## 2022-01-01 RX ADMIN — ALLOPURINOL 100 MG: 100 TABLET ORAL at 08:56

## 2022-01-01 RX ADMIN — SERTRALINE HYDROCHLORIDE 50 MG: 50 TABLET, FILM COATED ORAL at 08:17

## 2022-01-01 RX ADMIN — SODIUM CHLORIDE, POTASSIUM CHLORIDE, SODIUM LACTATE AND CALCIUM CHLORIDE 100 ML/HR: 600; 310; 30; 20 INJECTION, SOLUTION INTRAVENOUS at 12:40

## 2022-01-01 RX ADMIN — LORAZEPAM 2 MG: 2 INJECTION INTRAMUSCULAR at 17:55

## 2022-01-01 RX ADMIN — GLYCOPYRROLATE 0.4 MG: 0.2 INJECTION INTRAMUSCULAR; INTRAVENOUS at 16:40

## 2022-01-01 RX ADMIN — OXYCODONE 5 MG: 5 TABLET ORAL at 03:04

## 2022-01-01 RX ADMIN — DEXAMETHASONE SODIUM PHOSPHATE 8 MG: 10 INJECTION INTRAMUSCULAR; INTRAVENOUS at 07:53

## 2022-01-01 RX ADMIN — GLYCOPYRROLATE 0.4 MG: 0.2 INJECTION INTRAMUSCULAR; INTRAVENOUS at 00:34

## 2022-01-01 RX ADMIN — LORAZEPAM 2 MG: 2 INJECTION INTRAMUSCULAR at 20:25

## 2022-01-01 RX ADMIN — OLANZAPINE 2.5 MG: 2.5 TABLET ORAL at 21:19

## 2022-01-01 RX ADMIN — HYDROMORPHONE HYDROCHLORIDE 1.5 MG: 2 INJECTION, SOLUTION INTRAMUSCULAR; INTRAVENOUS; SUBCUTANEOUS at 13:15

## 2022-01-01 RX ADMIN — LORAZEPAM 2 MG: 2 CONCENTRATE ORAL at 08:11

## 2022-01-01 RX ADMIN — LORAZEPAM 2 MG: 2 INJECTION INTRAMUSCULAR at 20:45

## 2022-01-01 RX ADMIN — HYDROMORPHONE HYDROCHLORIDE 0.5 MG: 1 INJECTION, SOLUTION INTRAMUSCULAR; INTRAVENOUS; SUBCUTANEOUS at 22:25

## 2022-01-01 RX ADMIN — OLANZAPINE 2.5 MG: 2.5 TABLET ORAL at 10:02

## 2022-01-01 RX ADMIN — HYDROMORPHONE HYDROCHLORIDE 1.5 MG: 2 INJECTION, SOLUTION INTRAMUSCULAR; INTRAVENOUS; SUBCUTANEOUS at 04:45

## 2022-01-01 RX ADMIN — LORAZEPAM 2 MG: 2 INJECTION INTRAMUSCULAR; INTRAVENOUS at 17:00

## 2022-01-01 RX ADMIN — SCOPALAMINE 1 PATCH: 1 PATCH, EXTENDED RELEASE TRANSDERMAL at 16:38

## 2022-01-01 RX ADMIN — OXYCODONE 5 MG: 5 TABLET ORAL at 21:19

## 2022-01-01 RX ADMIN — ALLOPURINOL 100 MG: 100 TABLET ORAL at 09:17

## 2022-01-01 RX ADMIN — SERTRALINE HYDROCHLORIDE 50 MG: 50 TABLET, FILM COATED ORAL at 08:00

## 2022-01-01 RX ADMIN — HYDROMORPHONE HYDROCHLORIDE 1 MG: 1 INJECTION, SOLUTION INTRAMUSCULAR; INTRAVENOUS; SUBCUTANEOUS at 16:39

## 2022-01-01 RX ADMIN — DOCUSATE SODIUM 50MG AND SENNOSIDES 8.6MG 2 TABLET: 8.6; 5 TABLET, FILM COATED ORAL at 08:00

## 2022-01-01 RX ADMIN — LORAZEPAM 2 MG: 2 INJECTION INTRAMUSCULAR at 17:21

## 2022-01-01 RX ADMIN — LORAZEPAM 2 MG: 2 INJECTION INTRAMUSCULAR; INTRAVENOUS at 00:30

## 2022-01-01 RX ADMIN — GLYCOPYRROLATE 0.4 MG: 0.2 INJECTION INTRAMUSCULAR; INTRAVENOUS at 13:18

## 2022-01-01 RX ADMIN — MORPHINE SULFATE 1 MG: 2 INJECTION, SOLUTION INTRAMUSCULAR; INTRAVENOUS at 04:40

## 2022-01-01 RX ADMIN — GLYCOPYRROLATE 0.2 MG: 0.2 INJECTION INTRAMUSCULAR; INTRAVENOUS at 08:34

## 2022-01-01 RX ADMIN — SODIUM CHLORIDE, POTASSIUM CHLORIDE, SODIUM LACTATE AND CALCIUM CHLORIDE 100 ML/HR: 600; 310; 30; 20 INJECTION, SOLUTION INTRAVENOUS at 21:42

## 2022-01-01 RX ADMIN — OLANZAPINE 2.5 MG: 10 INJECTION, POWDER, FOR SOLUTION INTRAMUSCULAR at 12:59

## 2022-01-01 RX ADMIN — HYDROMORPHONE HYDROCHLORIDE 1.5 MG: 2 INJECTION, SOLUTION INTRAMUSCULAR; INTRAVENOUS; SUBCUTANEOUS at 21:11

## 2022-01-01 RX ADMIN — HYDROMORPHONE HYDROCHLORIDE 0.5 MG: 1 INJECTION, SOLUTION INTRAMUSCULAR; INTRAVENOUS; SUBCUTANEOUS at 23:02

## 2022-01-01 RX ADMIN — LORAZEPAM 2 MG: 2 INJECTION INTRAMUSCULAR at 16:42

## 2022-01-01 RX ADMIN — GLYCOPYRROLATE 0.4 MG: 0.2 INJECTION INTRAMUSCULAR; INTRAVENOUS at 12:19

## 2022-01-01 RX ADMIN — LORAZEPAM 2 MG: 2 INJECTION INTRAMUSCULAR at 00:28

## 2022-01-01 RX ADMIN — MORPHINE SULFATE 4 MG: 2 INJECTION, SOLUTION INTRAMUSCULAR; INTRAVENOUS at 00:26

## 2022-01-01 RX ADMIN — LORAZEPAM 2 MG: 2 INJECTION INTRAMUSCULAR at 04:40

## 2022-01-01 RX ADMIN — POLYETHYLENE GLYCOL 3350 17 G: 17 POWDER, FOR SOLUTION ORAL at 08:17

## 2022-01-01 RX ADMIN — HYDROMORPHONE HYDROCHLORIDE 1.5 MG: 2 INJECTION, SOLUTION INTRAMUSCULAR; INTRAVENOUS; SUBCUTANEOUS at 12:43

## 2022-01-01 RX ADMIN — MORPHINE SULFATE 1 MG: 2 INJECTION, SOLUTION INTRAMUSCULAR; INTRAVENOUS at 00:40

## 2022-01-01 RX ADMIN — HYDROMORPHONE HYDROCHLORIDE 1.5 MG: 2 INJECTION, SOLUTION INTRAMUSCULAR; INTRAVENOUS; SUBCUTANEOUS at 20:57

## 2022-01-01 RX ADMIN — NIFEDIPINE 30 MG: 30 TABLET, FILM COATED, EXTENDED RELEASE ORAL at 22:08

## 2022-01-01 RX ADMIN — HYDROMORPHONE HYDROCHLORIDE 1.5 MG: 2 INJECTION, SOLUTION INTRAMUSCULAR; INTRAVENOUS; SUBCUTANEOUS at 17:52

## 2022-01-01 RX ADMIN — LORAZEPAM 2 MG: 2 INJECTION INTRAMUSCULAR; INTRAVENOUS at 00:41

## 2022-01-01 RX ADMIN — GLYCOPYRROLATE 0.4 MG: 0.2 INJECTION INTRAMUSCULAR; INTRAVENOUS at 00:30

## 2022-01-01 RX ADMIN — LORAZEPAM 0.5 MG: 2 CONCENTRATE ORAL at 15:52

## 2022-01-01 RX ADMIN — LORAZEPAM 1 MG: 2 CONCENTRATE ORAL at 00:26

## 2022-01-01 RX ADMIN — HYDROMORPHONE HYDROCHLORIDE 1.5 MG: 2 INJECTION, SOLUTION INTRAMUSCULAR; INTRAVENOUS; SUBCUTANEOUS at 16:41

## 2022-01-01 RX ADMIN — GLYCOPYRROLATE 0.4 MG: 0.2 INJECTION INTRAMUSCULAR; INTRAVENOUS at 20:37

## 2022-01-01 RX ADMIN — HYDROMORPHONE HYDROCHLORIDE 1.5 MG: 2 INJECTION, SOLUTION INTRAMUSCULAR; INTRAVENOUS; SUBCUTANEOUS at 17:55

## 2022-01-01 RX ADMIN — LORAZEPAM 2 MG: 2 INJECTION INTRAMUSCULAR; INTRAVENOUS at 12:24

## 2022-01-01 RX ADMIN — GLYCOPYRROLATE 0.4 MG: 0.2 INJECTION INTRAMUSCULAR; INTRAVENOUS at 16:43

## 2022-01-01 RX ADMIN — SODIUM CHLORIDE 75 ML/HR: 9 INJECTION, SOLUTION INTRAVENOUS at 00:41

## 2022-01-01 RX ADMIN — LORAZEPAM 2 MG: 2 INJECTION INTRAMUSCULAR; INTRAVENOUS at 04:47

## 2022-01-01 RX ADMIN — GLYCOPYRROLATE 0.4 MG: 0.2 INJECTION INTRAMUSCULAR; INTRAVENOUS at 12:13

## 2022-01-01 RX ADMIN — LORAZEPAM 1 MG: 2 CONCENTRATE ORAL at 13:12

## 2022-01-01 RX ADMIN — HYDROMORPHONE HYDROCHLORIDE 1.5 MG: 2 INJECTION, SOLUTION INTRAMUSCULAR; INTRAVENOUS; SUBCUTANEOUS at 13:13

## 2022-01-01 RX ADMIN — NEOSTIGMINE METHYLSULFATE 2.5 MG: 0.5 INJECTION INTRAVENOUS at 09:08

## 2022-01-01 RX ADMIN — HYDROMORPHONE HYDROCHLORIDE 1.5 MG: 2 INJECTION, SOLUTION INTRAMUSCULAR; INTRAVENOUS; SUBCUTANEOUS at 04:37

## 2022-01-01 RX ADMIN — HYDROMORPHONE HYDROCHLORIDE 1.5 MG: 2 INJECTION, SOLUTION INTRAMUSCULAR; INTRAVENOUS; SUBCUTANEOUS at 17:20

## 2022-01-01 RX ADMIN — LORAZEPAM 2 MG: 2 INJECTION INTRAMUSCULAR at 00:47

## 2022-01-01 RX ADMIN — ROSUVASTATIN CALCIUM 2.5 MG: 5 TABLET, FILM COATED ORAL at 08:00

## 2022-01-01 RX ADMIN — ACETAMINOPHEN 1000 MG: 500 TABLET ORAL at 21:38

## 2022-01-01 RX ADMIN — LORAZEPAM 2 MG: 2 INJECTION INTRAMUSCULAR; INTRAVENOUS at 20:41

## 2022-01-01 RX ADMIN — LORAZEPAM 2 MG: 2 INJECTION INTRAMUSCULAR; INTRAVENOUS at 00:36

## 2022-01-01 RX ADMIN — ZIPRASIDONE MESYLATE 10 MG: 20 INJECTION, POWDER, LYOPHILIZED, FOR SOLUTION INTRAMUSCULAR at 21:31

## 2022-01-01 RX ADMIN — EPHEDRINE SULFATE 10 MG: 50 INJECTION INTRAVENOUS at 07:49

## 2022-01-01 RX ADMIN — ALLOPURINOL 100 MG: 100 TABLET ORAL at 08:04

## 2022-01-01 RX ADMIN — HYDROMORPHONE HYDROCHLORIDE 1.5 MG: 2 INJECTION, SOLUTION INTRAMUSCULAR; INTRAVENOUS; SUBCUTANEOUS at 00:26

## 2022-01-01 RX ADMIN — HYDROMORPHONE HYDROCHLORIDE 1.5 MG: 2 INJECTION, SOLUTION INTRAMUSCULAR; INTRAVENOUS; SUBCUTANEOUS at 16:48

## 2022-01-01 RX ADMIN — GLYCOPYRROLATE 0.4 MG: 0.2 INJECTION INTRAMUSCULAR; INTRAVENOUS at 04:37

## 2022-01-01 RX ADMIN — LORAZEPAM 2 MG: 2 INJECTION INTRAMUSCULAR; INTRAVENOUS at 21:32

## 2022-01-01 RX ADMIN — LORAZEPAM 2 MG: 2 INJECTION INTRAMUSCULAR at 09:35

## 2022-01-01 RX ADMIN — ALLOPURINOL 100 MG: 100 TABLET ORAL at 08:17

## 2022-01-01 RX ADMIN — HYDROMORPHONE HYDROCHLORIDE 1.5 MG: 2 INJECTION, SOLUTION INTRAMUSCULAR; INTRAVENOUS; SUBCUTANEOUS at 20:32

## 2022-01-01 RX ADMIN — DONEPEZIL HYDROCHLORIDE 5 MG: 5 TABLET, FILM COATED ORAL at 20:22

## 2022-01-01 RX ADMIN — LEVOTHYROXINE SODIUM 150 MCG: 0.15 TABLET ORAL at 05:55

## 2022-01-01 RX ADMIN — GLYCOPYRROLATE 0.4 MG: 0.2 INJECTION INTRAMUSCULAR; INTRAVENOUS at 08:45

## 2022-01-01 RX ADMIN — ROCURONIUM BROMIDE 10 MG: 50 INJECTION INTRAVENOUS at 08:54

## 2022-01-01 RX ADMIN — ASPIRIN 81 MG: 81 TABLET, COATED ORAL at 21:37

## 2022-01-01 RX ADMIN — LORAZEPAM 2 MG: 2 INJECTION INTRAMUSCULAR at 13:18

## 2022-01-01 RX ADMIN — CARVEDILOL 6.25 MG: 6.25 TABLET, FILM COATED ORAL at 20:22

## 2022-01-01 RX ADMIN — CARVEDILOL 6.25 MG: 6.25 TABLET, FILM COATED ORAL at 08:17

## 2022-01-01 RX ADMIN — GLYCOPYRROLATE 0.4 MG: 0.2 INJECTION INTRAMUSCULAR; INTRAVENOUS at 20:28

## 2022-01-01 RX ADMIN — LORAZEPAM 2 MG: 2 INJECTION INTRAMUSCULAR at 13:13

## 2022-01-01 RX ADMIN — CEFTRIAXONE 1 G: 1 INJECTION, POWDER, FOR SOLUTION INTRAMUSCULAR; INTRAVENOUS at 23:02

## 2022-01-01 RX ADMIN — GLYCOPYRROLATE 0.4 MG: 0.2 INJECTION INTRAMUSCULAR; INTRAVENOUS at 16:49

## 2022-01-01 RX ADMIN — HYDROMORPHONE HYDROCHLORIDE 1 MG: 1 INJECTION, SOLUTION INTRAMUSCULAR; INTRAVENOUS; SUBCUTANEOUS at 04:51

## 2022-01-01 RX ADMIN — SERTRALINE HYDROCHLORIDE 50 MG: 50 TABLET, FILM COATED ORAL at 08:04

## 2022-01-01 RX ADMIN — LORAZEPAM 2 MG: 2 INJECTION INTRAMUSCULAR; INTRAVENOUS at 08:58

## 2022-01-01 RX ADMIN — LORAZEPAM 2 MG: 2 INJECTION INTRAMUSCULAR; INTRAVENOUS at 04:22

## 2022-01-01 RX ADMIN — GLYCOPYRROLATE 0.4 MG: 0.2 INJECTION INTRAMUSCULAR; INTRAVENOUS at 00:24

## 2022-01-01 RX ADMIN — Medication 10 ML: at 20:25

## 2022-01-01 RX ADMIN — LORAZEPAM 2 MG: 2 INJECTION INTRAMUSCULAR; INTRAVENOUS at 13:07

## 2022-01-01 RX ADMIN — HYDROMORPHONE HYDROCHLORIDE 1.5 MG: 2 INJECTION, SOLUTION INTRAMUSCULAR; INTRAVENOUS; SUBCUTANEOUS at 17:34

## 2022-01-01 RX ADMIN — HYDRALAZINE HYDROCHLORIDE 10 MG: 20 INJECTION INTRAMUSCULAR; INTRAVENOUS at 22:26

## 2022-01-01 RX ADMIN — HYDRALAZINE HYDROCHLORIDE 10 MG: 20 INJECTION INTRAMUSCULAR; INTRAVENOUS at 05:36

## 2022-01-01 RX ADMIN — LORAZEPAM 2 MG: 2 INJECTION INTRAMUSCULAR; INTRAVENOUS at 12:34

## 2022-01-01 RX ADMIN — LORAZEPAM 2 MG: 2 INJECTION INTRAMUSCULAR at 13:50

## 2022-01-01 RX ADMIN — NIFEDIPINE 30 MG: 30 TABLET, FILM COATED, EXTENDED RELEASE ORAL at 20:22

## 2022-01-01 RX ADMIN — HYDROMORPHONE HYDROCHLORIDE 1.5 MG: 2 INJECTION, SOLUTION INTRAMUSCULAR; INTRAVENOUS; SUBCUTANEOUS at 04:43

## 2022-01-01 RX ADMIN — HYDROMORPHONE HYDROCHLORIDE 1.5 MG: 2 INJECTION, SOLUTION INTRAMUSCULAR; INTRAVENOUS; SUBCUTANEOUS at 12:19

## 2022-01-01 RX ADMIN — LORAZEPAM 2 MG: 2 INJECTION INTRAMUSCULAR; INTRAVENOUS at 09:19

## 2022-01-01 RX ADMIN — LORAZEPAM 2 MG: 2 INJECTION INTRAMUSCULAR at 16:47

## 2022-01-01 RX ADMIN — HYDROMORPHONE HYDROCHLORIDE 1.5 MG: 2 INJECTION, SOLUTION INTRAMUSCULAR; INTRAVENOUS; SUBCUTANEOUS at 12:59

## 2022-01-01 RX ADMIN — HYDROMORPHONE HYDROCHLORIDE 1.5 MG: 2 INJECTION, SOLUTION INTRAMUSCULAR; INTRAVENOUS; SUBCUTANEOUS at 08:40

## 2022-01-01 RX ADMIN — GLYCOPYRROLATE 0.4 MG: 0.2 INJECTION INTRAMUSCULAR; INTRAVENOUS at 16:20

## 2022-01-01 RX ADMIN — GLYCOPYRROLATE 0.4 MG: 0.2 INJECTION INTRAMUSCULAR; INTRAVENOUS at 09:56

## 2022-01-01 RX ADMIN — FENTANYL CITRATE 25 MCG: 0.05 INJECTION, SOLUTION INTRAMUSCULAR; INTRAVENOUS at 22:31

## 2022-01-01 RX ADMIN — LORAZEPAM 1 MG: 2 CONCENTRATE ORAL at 12:19

## 2022-01-01 RX ADMIN — MORPHINE SULFATE 4 MG: 4 INJECTION, SOLUTION INTRAMUSCULAR; INTRAVENOUS at 12:24

## 2022-01-01 RX ADMIN — GLYCOPYRROLATE 0.4 MG: 0.2 INJECTION INTRAMUSCULAR; INTRAVENOUS at 04:21

## 2022-01-01 RX ADMIN — LORAZEPAM 1 MG: 2 CONCENTRATE ORAL at 21:13

## 2022-01-01 RX ADMIN — OLANZAPINE 2.5 MG: 2.5 TABLET ORAL at 08:56

## 2022-01-01 RX ADMIN — MORPHINE SULFATE 4 MG: 2 INJECTION, SOLUTION INTRAMUSCULAR; INTRAVENOUS at 08:15

## 2022-01-01 RX ADMIN — LORAZEPAM 2 MG: 2 INJECTION INTRAMUSCULAR at 08:40

## 2022-01-01 RX ADMIN — GLYCOPYRROLATE 0.4 MG: 0.2 INJECTION INTRAMUSCULAR; INTRAVENOUS at 04:43

## 2022-01-01 RX ADMIN — LORAZEPAM 2 MG: 2 INJECTION INTRAMUSCULAR; INTRAVENOUS at 04:50

## 2022-01-01 RX ADMIN — ASPIRIN 81 MG: 81 TABLET, COATED ORAL at 20:24

## 2022-01-01 RX ADMIN — GLYCOPYRROLATE 0.4 MG: 0.2 INJECTION INTRAMUSCULAR; INTRAVENOUS at 08:07

## 2022-01-01 RX ADMIN — GLYCOPYRROLATE 0.4 MG: 0.2 INJECTION INTRAMUSCULAR; INTRAVENOUS at 12:43

## 2022-01-01 RX ADMIN — LORAZEPAM 1 MG: 2 CONCENTRATE ORAL at 08:15

## 2022-01-01 RX ADMIN — GLYCOPYRROLATE 0.4 MG: 0.2 INJECTION INTRAMUSCULAR; INTRAVENOUS at 12:55

## 2022-01-01 RX ADMIN — HYDROMORPHONE HYDROCHLORIDE 1 MG: 1 INJECTION, SOLUTION INTRAMUSCULAR; INTRAVENOUS; SUBCUTANEOUS at 00:36

## 2022-01-01 RX ADMIN — HYDROMORPHONE HYDROCHLORIDE 0.5 MG: 1 INJECTION, SOLUTION INTRAMUSCULAR; INTRAVENOUS; SUBCUTANEOUS at 04:01

## 2022-01-01 RX ADMIN — HYDROMORPHONE HYDROCHLORIDE 0.5 MG: 1 INJECTION, SOLUTION INTRAMUSCULAR; INTRAVENOUS; SUBCUTANEOUS at 15:34

## 2022-01-01 RX ADMIN — LORAZEPAM 1 MG: 2 CONCENTRATE ORAL at 12:40

## 2022-01-01 RX ADMIN — LORAZEPAM 2 MG: 2 INJECTION INTRAMUSCULAR at 04:47

## 2022-01-01 RX ADMIN — GLYCOPYRROLATE 0.4 MG: 0.2 INJECTION INTRAMUSCULAR; INTRAVENOUS at 21:30

## 2022-01-01 RX ADMIN — HYDROMORPHONE HYDROCHLORIDE 1 MG: 1 INJECTION, SOLUTION INTRAMUSCULAR; INTRAVENOUS; SUBCUTANEOUS at 00:59

## 2022-01-01 RX ADMIN — GLYCOPYRROLATE 0.4 MG: 0.2 INJECTION INTRAMUSCULAR; INTRAVENOUS at 16:47

## 2022-01-01 RX ADMIN — HYDROMORPHONE HYDROCHLORIDE 1 MG: 1 INJECTION, SOLUTION INTRAMUSCULAR; INTRAVENOUS; SUBCUTANEOUS at 00:30

## 2022-01-01 RX ADMIN — HYDROMORPHONE HYDROCHLORIDE 1.5 MG: 2 INJECTION, SOLUTION INTRAMUSCULAR; INTRAVENOUS; SUBCUTANEOUS at 21:29

## 2022-01-01 RX ADMIN — GLYCOPYRROLATE 0.4 MG: 0.2 INJECTION INTRAMUSCULAR; INTRAVENOUS at 00:36

## 2022-01-01 RX ADMIN — NIFEDIPINE 30 MG: 30 TABLET, FILM COATED, EXTENDED RELEASE ORAL at 08:17

## 2022-01-01 RX ADMIN — HYDROMORPHONE HYDROCHLORIDE 1 MG: 1 INJECTION, SOLUTION INTRAMUSCULAR; INTRAVENOUS; SUBCUTANEOUS at 20:45

## 2022-01-01 RX ADMIN — LORAZEPAM 2 MG: 2 INJECTION INTRAMUSCULAR at 04:38

## 2022-01-01 RX ADMIN — HYDRALAZINE HYDROCHLORIDE 10 MG: 20 INJECTION INTRAMUSCULAR; INTRAVENOUS at 18:40

## 2022-01-01 RX ADMIN — HYDROMORPHONE HYDROCHLORIDE 1 MG: 1 INJECTION, SOLUTION INTRAMUSCULAR; INTRAVENOUS; SUBCUTANEOUS at 04:47

## 2022-01-01 RX ADMIN — MORPHINE SULFATE 4 MG: 4 INJECTION, SOLUTION INTRAMUSCULAR; INTRAVENOUS at 08:11

## 2022-01-01 RX ADMIN — ACETAMINOPHEN 1000 MG: 500 TABLET ORAL at 17:09

## 2022-01-01 RX ADMIN — HYDROMORPHONE HYDROCHLORIDE 1.5 MG: 2 INJECTION, SOLUTION INTRAMUSCULAR; INTRAVENOUS; SUBCUTANEOUS at 16:42

## 2022-01-01 RX ADMIN — LORAZEPAM 2 MG: 2 INJECTION INTRAMUSCULAR; INTRAVENOUS at 00:44

## 2022-01-01 RX ADMIN — DOCUSATE SODIUM 50MG AND SENNOSIDES 8.6MG 2 TABLET: 8.6; 5 TABLET, FILM COATED ORAL at 22:08

## 2022-01-01 RX ADMIN — SERTRALINE HYDROCHLORIDE 50 MG: 50 TABLET, FILM COATED ORAL at 09:17

## 2022-01-01 RX ADMIN — GLYCOPYRROLATE 0.4 MG: 0.2 INJECTION INTRAMUSCULAR; INTRAVENOUS at 17:34

## 2022-01-01 RX ADMIN — LORAZEPAM 2 MG: 2 INJECTION INTRAMUSCULAR at 00:45

## 2022-01-01 RX ADMIN — NIFEDIPINE 30 MG: 30 TABLET, FILM COATED, EXTENDED RELEASE ORAL at 08:00

## 2022-01-01 RX ADMIN — GLYCOPYRROLATE 0.4 MG: 0.2 INJECTION INTRAMUSCULAR; INTRAVENOUS at 21:32

## 2022-01-01 RX ADMIN — SODIUM CHLORIDE, POTASSIUM CHLORIDE, SODIUM LACTATE AND CALCIUM CHLORIDE 100 ML/HR: 600; 310; 30; 20 INJECTION, SOLUTION INTRAVENOUS at 06:46

## 2022-01-01 RX ADMIN — LORAZEPAM 2 MG: 2 INJECTION INTRAMUSCULAR at 12:55

## 2022-01-01 RX ADMIN — HYDROMORPHONE HYDROCHLORIDE 1.5 MG: 2 INJECTION, SOLUTION INTRAMUSCULAR; INTRAVENOUS; SUBCUTANEOUS at 17:00

## 2022-01-01 RX ADMIN — HYDROMORPHONE HYDROCHLORIDE 1.5 MG: 2 INJECTION, SOLUTION INTRAMUSCULAR; INTRAVENOUS; SUBCUTANEOUS at 16:20

## 2022-01-01 RX ADMIN — LORAZEPAM 2 MG: 2 INJECTION INTRAMUSCULAR at 21:20

## 2022-01-01 RX ADMIN — ACETAMINOPHEN 1000 MG: 500 TABLET ORAL at 08:57

## 2022-01-01 RX ADMIN — HYDROMORPHONE HYDROCHLORIDE 1.5 MG: 2 INJECTION, SOLUTION INTRAMUSCULAR; INTRAVENOUS; SUBCUTANEOUS at 08:32

## 2022-01-01 RX ADMIN — OLANZAPINE 2.5 MG: 2.5 TABLET ORAL at 20:24

## 2022-01-01 RX ADMIN — ASPIRIN 81 MG: 81 TABLET, COATED ORAL at 08:56

## 2022-01-01 RX ADMIN — LORAZEPAM 1 MG: 2 CONCENTRATE ORAL at 20:07

## 2022-01-01 RX ADMIN — GLYCOPYRROLATE 0.4 MG: 0.2 INJECTION INTRAMUSCULAR; INTRAVENOUS at 04:50

## 2022-01-01 RX ADMIN — FENTANYL CITRATE 25 MCG: 0.05 INJECTION, SOLUTION INTRAMUSCULAR; INTRAVENOUS at 21:43

## 2022-01-01 RX ADMIN — GLYCOPYRROLATE 0.4 MG: 0.2 INJECTION INTRAMUSCULAR; INTRAVENOUS at 20:31

## 2022-01-01 RX ADMIN — LORAZEPAM 2 MG: 2 INJECTION INTRAMUSCULAR; INTRAVENOUS at 21:30

## 2022-01-01 RX ADMIN — LORAZEPAM 2 MG: 2 INJECTION INTRAMUSCULAR at 20:50

## 2022-01-01 RX ADMIN — HYDROMORPHONE HYDROCHLORIDE 1.5 MG: 2 INJECTION, SOLUTION INTRAMUSCULAR; INTRAVENOUS; SUBCUTANEOUS at 09:19

## 2022-01-01 RX ADMIN — LORAZEPAM 2 MG: 2 INJECTION INTRAMUSCULAR; INTRAVENOUS at 00:26

## 2022-01-01 RX ADMIN — LORAZEPAM 2 MG: 2 INJECTION INTRAMUSCULAR at 16:37

## 2022-01-01 RX ADMIN — Medication 10 ML: at 08:04

## 2022-01-01 RX ADMIN — HYDROMORPHONE HYDROCHLORIDE 1.5 MG: 2 INJECTION, SOLUTION INTRAMUSCULAR; INTRAVENOUS; SUBCUTANEOUS at 09:08

## 2022-01-01 RX ADMIN — HYDROMORPHONE HYDROCHLORIDE 1.5 MG: 2 INJECTION, SOLUTION INTRAMUSCULAR; INTRAVENOUS; SUBCUTANEOUS at 21:32

## 2022-01-01 RX ADMIN — LORAZEPAM 2 MG: 2 INJECTION INTRAMUSCULAR; INTRAVENOUS at 12:43

## 2022-01-01 RX ADMIN — LORAZEPAM 2 MG: 2 INJECTION INTRAMUSCULAR at 00:43

## 2022-01-01 RX ADMIN — HYDROMORPHONE HYDROCHLORIDE 1 MG: 1 INJECTION, SOLUTION INTRAMUSCULAR; INTRAVENOUS; SUBCUTANEOUS at 08:57

## 2022-01-01 RX ADMIN — LORAZEPAM 2 MG: 2 INJECTION INTRAMUSCULAR at 08:18

## 2022-01-01 RX ADMIN — HYDROMORPHONE HYDROCHLORIDE 1.5 MG: 2 INJECTION, SOLUTION INTRAMUSCULAR; INTRAVENOUS; SUBCUTANEOUS at 09:34

## 2022-01-01 RX ADMIN — GLYCOPYRROLATE 0.4 MG: 0.2 INJECTION INTRAMUSCULAR; INTRAVENOUS at 08:55

## 2022-01-01 RX ADMIN — CEFTRIAXONE 1 G: 1 INJECTION, POWDER, FOR SOLUTION INTRAMUSCULAR; INTRAVENOUS at 00:39

## 2022-01-01 RX ADMIN — HYDROMORPHONE HYDROCHLORIDE 0.5 MG: 1 INJECTION, SOLUTION INTRAMUSCULAR; INTRAVENOUS; SUBCUTANEOUS at 00:58

## 2022-01-01 RX ADMIN — LORAZEPAM 2 MG: 2 INJECTION INTRAMUSCULAR at 20:37

## 2022-01-01 RX ADMIN — SODIUM CHLORIDE, POTASSIUM CHLORIDE, SODIUM LACTATE AND CALCIUM CHLORIDE 150 ML/HR: 600; 310; 30; 20 INJECTION, SOLUTION INTRAVENOUS at 23:02

## 2022-01-01 RX ADMIN — LORAZEPAM 2 MG: 2 INJECTION INTRAMUSCULAR at 00:59

## 2022-01-01 RX ADMIN — GLYCOPYRROLATE 0.4 MG: 0.2 INJECTION INTRAMUSCULAR; INTRAVENOUS at 00:28

## 2022-01-01 RX ADMIN — HYDROMORPHONE HYDROCHLORIDE 1.5 MG: 2 INJECTION, SOLUTION INTRAMUSCULAR; INTRAVENOUS; SUBCUTANEOUS at 16:40

## 2022-01-01 RX ADMIN — LORAZEPAM 2 MG: 2 INJECTION INTRAMUSCULAR at 17:32

## 2022-01-01 RX ADMIN — GLYCOPYRROLATE 0.4 MG: 0.2 INJECTION INTRAMUSCULAR; INTRAVENOUS at 00:44

## 2022-01-01 RX ADMIN — LORAZEPAM 2 MG: 2 INJECTION INTRAMUSCULAR at 17:10

## 2022-01-01 RX ADMIN — GLYCOPYRROLATE 0.4 MG: 0.2 INJECTION INTRAMUSCULAR; INTRAVENOUS at 00:47

## 2022-01-01 RX ADMIN — HYDROMORPHONE HYDROCHLORIDE 0.5 MG: 1 INJECTION, SOLUTION INTRAMUSCULAR; INTRAVENOUS; SUBCUTANEOUS at 08:14

## 2022-01-01 RX ADMIN — LORAZEPAM 2 MG: 2 INJECTION INTRAMUSCULAR at 09:27

## 2022-01-01 RX ADMIN — HYDROMORPHONE HYDROCHLORIDE 1.5 MG: 2 INJECTION, SOLUTION INTRAMUSCULAR; INTRAVENOUS; SUBCUTANEOUS at 12:47

## 2022-01-01 RX ADMIN — LORAZEPAM 2 MG: 2 INJECTION INTRAMUSCULAR at 20:32

## 2022-01-01 RX ADMIN — LORAZEPAM 2 MG: 2 INJECTION INTRAMUSCULAR at 08:32

## 2022-01-01 RX ADMIN — LORAZEPAM 2 MG: 2 INJECTION INTRAMUSCULAR at 04:43

## 2022-01-01 RX ADMIN — HYDROMORPHONE HYDROCHLORIDE 1.5 MG: 2 INJECTION, SOLUTION INTRAMUSCULAR; INTRAVENOUS; SUBCUTANEOUS at 04:39

## 2022-01-01 RX ADMIN — MORPHINE SULFATE 4 MG: 2 INJECTION, SOLUTION INTRAMUSCULAR; INTRAVENOUS at 09:09

## 2022-01-01 RX ADMIN — LORAZEPAM 2 MG: 2 INJECTION INTRAMUSCULAR at 09:57

## 2022-01-01 RX ADMIN — HYDRALAZINE HYDROCHLORIDE 5 MG: 20 INJECTION INTRAMUSCULAR; INTRAVENOUS at 09:49

## 2022-01-01 RX ADMIN — LORAZEPAM 2 MG: 2 INJECTION INTRAMUSCULAR; INTRAVENOUS at 16:20

## 2022-01-01 RX ADMIN — PROPOFOL 60 MG: 10 INJECTION, EMULSION INTRAVENOUS at 07:49

## 2022-01-01 RX ADMIN — FENTANYL CITRATE 25 MCG: 0.05 INJECTION, SOLUTION INTRAMUSCULAR; INTRAVENOUS at 09:00

## 2022-01-01 RX ADMIN — LORAZEPAM 2 MG: 2 INJECTION INTRAMUSCULAR; INTRAVENOUS at 17:34

## 2022-01-01 RX ADMIN — NIFEDIPINE 30 MG: 30 TABLET, FILM COATED, EXTENDED RELEASE ORAL at 20:24

## 2022-01-01 RX ADMIN — HYDROMORPHONE HYDROCHLORIDE 1.5 MG: 2 INJECTION, SOLUTION INTRAMUSCULAR; INTRAVENOUS; SUBCUTANEOUS at 08:58

## 2022-01-01 RX ADMIN — GLYCOPYRROLATE 0.4 MG: 0.2 INJECTION INTRAMUSCULAR; INTRAVENOUS at 08:57

## 2022-01-01 RX ADMIN — LORAZEPAM 2 MG: 2 INJECTION INTRAMUSCULAR at 08:46

## 2022-01-01 RX ADMIN — GLYCOPYRROLATE 0.4 MG: 0.2 INJECTION INTRAMUSCULAR; INTRAVENOUS at 05:12

## 2022-01-01 RX ADMIN — HYDROMORPHONE HYDROCHLORIDE 0.5 MG: 1 INJECTION, SOLUTION INTRAMUSCULAR; INTRAVENOUS; SUBCUTANEOUS at 15:26

## 2022-01-01 RX ADMIN — HYDROMORPHONE HYDROCHLORIDE 1.5 MG: 2 INJECTION, SOLUTION INTRAMUSCULAR; INTRAVENOUS; SUBCUTANEOUS at 09:01

## 2022-01-01 RX ADMIN — Medication 10 ML: at 09:17

## 2022-01-01 RX ADMIN — HYDROMORPHONE HYDROCHLORIDE 1.5 MG: 2 INJECTION, SOLUTION INTRAMUSCULAR; INTRAVENOUS; SUBCUTANEOUS at 16:44

## 2022-01-01 RX ADMIN — LORAZEPAM 2 MG: 2 INJECTION INTRAMUSCULAR at 21:06

## 2022-01-01 RX ADMIN — Medication 10 ML: at 23:03

## 2022-01-01 RX ADMIN — HYDROMORPHONE HYDROCHLORIDE 1.5 MG: 2 INJECTION, SOLUTION INTRAMUSCULAR; INTRAVENOUS; SUBCUTANEOUS at 17:10

## 2022-01-01 RX ADMIN — HYDROMORPHONE HYDROCHLORIDE 1 MG: 1 INJECTION, SOLUTION INTRAMUSCULAR; INTRAVENOUS; SUBCUTANEOUS at 20:41

## 2022-01-01 RX ADMIN — Medication 10 ML: at 09:01

## 2022-01-01 RX ADMIN — MORPHINE SULFATE 4 MG: 2 INJECTION, SOLUTION INTRAMUSCULAR; INTRAVENOUS at 12:19

## 2022-01-01 RX ADMIN — ROCURONIUM BROMIDE 10 MG: 50 INJECTION INTRAVENOUS at 08:35

## 2022-01-01 RX ADMIN — HYDROMORPHONE HYDROCHLORIDE 1.5 MG: 2 INJECTION, SOLUTION INTRAMUSCULAR; INTRAVENOUS; SUBCUTANEOUS at 14:15

## 2022-01-01 RX ADMIN — LORAZEPAM 2 MG: 2 INJECTION INTRAMUSCULAR at 01:00

## 2022-01-01 RX ADMIN — HYDROMORPHONE HYDROCHLORIDE 1.5 MG: 2 INJECTION, SOLUTION INTRAMUSCULAR; INTRAVENOUS; SUBCUTANEOUS at 08:55

## 2022-01-01 RX ADMIN — Medication 10 ML: at 08:17

## 2022-01-01 RX ADMIN — GLYCOPYRROLATE 0.4 MG: 0.2 INJECTION INTRAMUSCULAR; INTRAVENOUS at 00:37

## 2022-01-01 RX ADMIN — CEFTRIAXONE 1 G: 1 INJECTION, POWDER, FOR SOLUTION INTRAMUSCULAR; INTRAVENOUS at 23:54

## 2022-01-01 RX ADMIN — HYDROMORPHONE HYDROCHLORIDE 1 MG: 1 INJECTION, SOLUTION INTRAMUSCULAR; INTRAVENOUS; SUBCUTANEOUS at 13:36

## 2022-01-01 RX ADMIN — GLYCOPYRROLATE 0.4 MG: 0.2 INJECTION INTRAMUSCULAR; INTRAVENOUS at 08:32

## 2022-01-01 RX ADMIN — ACETAMINOPHEN 1000 MG: 500 TABLET ORAL at 16:53

## 2022-01-01 RX ADMIN — HYDROMORPHONE HYDROCHLORIDE 1 MG: 1 INJECTION, SOLUTION INTRAMUSCULAR; INTRAVENOUS; SUBCUTANEOUS at 21:06

## 2022-01-01 RX ADMIN — GLYCOPYRROLATE 0.4 MG: 0.2 INJECTION INTRAMUSCULAR; INTRAVENOUS at 00:26

## 2022-01-01 RX ADMIN — HYDROMORPHONE HYDROCHLORIDE 1.5 MG: 2 INJECTION, SOLUTION INTRAMUSCULAR; INTRAVENOUS; SUBCUTANEOUS at 00:37

## 2022-01-01 RX ADMIN — GLYCOPYRROLATE 0.4 MG: 0.2 INJECTION INTRAMUSCULAR; INTRAVENOUS at 00:42

## 2022-01-01 RX ADMIN — CARVEDILOL 6.25 MG: 6.25 TABLET, FILM COATED ORAL at 22:07

## 2022-01-01 RX ADMIN — LORAZEPAM 2 MG: 2 INJECTION INTRAMUSCULAR at 04:54

## 2022-01-01 RX ADMIN — ERGOCALCIFEROL 50000 UNITS: 1.25 CAPSULE ORAL at 19:01

## 2022-01-01 RX ADMIN — HYDROMORPHONE HYDROCHLORIDE 1.5 MG: 2 INJECTION, SOLUTION INTRAMUSCULAR; INTRAVENOUS; SUBCUTANEOUS at 00:21

## 2022-01-01 RX ADMIN — GLYCOPYRROLATE 0.4 MG: 0.2 INJECTION INTRAMUSCULAR; INTRAVENOUS at 04:54

## 2022-01-01 RX ADMIN — GLYCOPYRROLATE 0.4 MG: 0.2 INJECTION INTRAMUSCULAR; INTRAVENOUS at 16:37

## 2022-01-01 RX ADMIN — ASPIRIN 81 MG: 81 TABLET, COATED ORAL at 08:17

## 2022-01-01 RX ADMIN — SCOPALAMINE 1 PATCH: 1 PATCH, EXTENDED RELEASE TRANSDERMAL at 16:41

## 2022-01-01 RX ADMIN — LORAZEPAM 2 MG: 2 INJECTION INTRAMUSCULAR; INTRAVENOUS at 08:07

## 2022-01-01 RX ADMIN — DONEPEZIL HYDROCHLORIDE 5 MG: 5 TABLET, FILM COATED ORAL at 20:24

## 2022-01-01 RX ADMIN — LORAZEPAM 2 MG: 2 INJECTION INTRAMUSCULAR; INTRAVENOUS at 16:40

## 2022-01-01 RX ADMIN — FOLIC ACID 1 MG: 1 TABLET ORAL at 09:17

## 2022-01-01 RX ADMIN — GLYCOPYRROLATE 0.4 MG: 0.2 INJECTION INTRAMUSCULAR; INTRAVENOUS at 20:49

## 2022-01-01 RX ADMIN — HYDROMORPHONE HYDROCHLORIDE 1.5 MG: 2 INJECTION, SOLUTION INTRAMUSCULAR; INTRAVENOUS; SUBCUTANEOUS at 20:24

## 2022-01-01 RX ADMIN — GLYCOPYRROLATE 0.4 MG: 0.2 INJECTION INTRAMUSCULAR; INTRAVENOUS at 13:36

## 2022-01-01 RX ADMIN — CARVEDILOL 6.25 MG: 6.25 TABLET, FILM COATED ORAL at 09:17

## 2022-01-01 RX ADMIN — GLYCOPYRROLATE 0.4 MG: 0.2 INJECTION INTRAMUSCULAR; INTRAVENOUS at 12:59

## 2022-01-01 RX ADMIN — HYDROMORPHONE HYDROCHLORIDE 1.5 MG: 2 INJECTION, SOLUTION INTRAMUSCULAR; INTRAVENOUS; SUBCUTANEOUS at 08:18

## 2022-01-01 RX ADMIN — MORPHINE SULFATE 4 MG: 4 INJECTION, SOLUTION INTRAMUSCULAR; INTRAVENOUS at 21:12

## 2022-01-01 RX ADMIN — LORAZEPAM 2 MG: 2 INJECTION INTRAMUSCULAR at 12:14

## 2022-01-01 RX ADMIN — ACETAMINOPHEN 1000 MG: 500 TABLET ORAL at 16:24

## 2022-01-01 RX ADMIN — HYDROMORPHONE HYDROCHLORIDE 1.5 MG: 2 INJECTION, SOLUTION INTRAMUSCULAR; INTRAVENOUS; SUBCUTANEOUS at 20:51

## 2022-01-01 RX ADMIN — SODIUM CHLORIDE, POTASSIUM CHLORIDE, SODIUM LACTATE AND CALCIUM CHLORIDE 150 ML/HR: 600; 310; 30; 20 INJECTION, SOLUTION INTRAVENOUS at 06:10

## 2022-01-01 RX ADMIN — GLYCOPYRROLATE 0.4 MG: 0.2 INJECTION INTRAMUSCULAR; INTRAVENOUS at 20:32

## 2022-01-01 RX ADMIN — ACETAMINOPHEN 1000 MG: 500 TABLET ORAL at 08:04

## 2022-01-01 RX ADMIN — LORAZEPAM 2 MG: 2 INJECTION INTRAMUSCULAR at 05:13

## 2022-01-01 RX ADMIN — CARVEDILOL 6.25 MG: 6.25 TABLET, FILM COATED ORAL at 20:24

## 2022-01-01 RX ADMIN — GLYCOPYRROLATE 0.4 MG: 0.2 INJECTION INTRAMUSCULAR; INTRAVENOUS at 20:25

## 2022-01-01 RX ADMIN — DONEPEZIL HYDROCHLORIDE 5 MG: 5 TABLET, FILM COATED ORAL at 21:19

## 2022-01-01 RX ADMIN — ROSUVASTATIN CALCIUM 2.5 MG: 5 TABLET, FILM COATED ORAL at 09:16

## 2022-01-01 RX ADMIN — HYDROMORPHONE HYDROCHLORIDE 1.5 MG: 2 INJECTION, SOLUTION INTRAMUSCULAR; INTRAVENOUS; SUBCUTANEOUS at 09:27

## 2022-01-01 RX ADMIN — CEFTRIAXONE 1 G: 1 INJECTION, POWDER, FOR SOLUTION INTRAMUSCULAR; INTRAVENOUS at 00:40

## 2022-01-01 RX ADMIN — GLYCOPYRROLATE 0.4 MG: 0.2 INJECTION INTRAMUSCULAR; INTRAVENOUS at 20:45

## 2022-01-01 RX ADMIN — HYDROMORPHONE HYDROCHLORIDE 1 MG: 1 INJECTION, SOLUTION INTRAMUSCULAR; INTRAVENOUS; SUBCUTANEOUS at 08:25

## 2022-01-01 RX ADMIN — LORAZEPAM 2 MG: 2 INJECTION INTRAMUSCULAR at 00:35

## 2022-01-01 RX ADMIN — Medication 10 ML: at 22:28

## 2022-01-01 RX ADMIN — GLYCOPYRROLATE 0.4 MG: 0.2 INJECTION INTRAMUSCULAR; INTRAVENOUS at 00:43

## 2022-01-01 RX ADMIN — GLYCOPYRROLATE 0.4 MG: 0.2 INJECTION INTRAMUSCULAR; INTRAVENOUS at 00:35

## 2022-01-01 RX ADMIN — GLYCOPYRROLATE 0.4 MG: 0.2 INJECTION INTRAMUSCULAR; INTRAVENOUS at 17:01

## 2022-01-01 RX ADMIN — GLYCOPYRROLATE 0.4 MG: 0.2 INJECTION INTRAMUSCULAR; INTRAVENOUS at 13:50

## 2022-01-01 RX ADMIN — ACETAMINOPHEN 1000 MG: 500 TABLET ORAL at 09:16

## 2022-01-01 RX ADMIN — SERTRALINE HYDROCHLORIDE 50 MG: 50 TABLET, FILM COATED ORAL at 08:56

## 2022-01-01 RX ADMIN — ACETAMINOPHEN 1000 MG: 500 TABLET ORAL at 10:02

## 2022-01-01 RX ADMIN — GLYCOPYRROLATE 0.4 MG: 0.2 INJECTION INTRAMUSCULAR; INTRAVENOUS at 12:47

## 2022-01-01 RX ADMIN — CARVEDILOL 6.25 MG: 6.25 TABLET, FILM COATED ORAL at 21:37

## 2022-01-01 RX ADMIN — ROSUVASTATIN CALCIUM 2.5 MG: 5 TABLET, FILM COATED ORAL at 08:17

## 2022-01-01 RX ADMIN — SCOPALAMINE 1 PATCH: 1 PATCH, EXTENDED RELEASE TRANSDERMAL at 17:58

## 2022-01-01 RX ADMIN — LORAZEPAM 2 MG: 2 INJECTION INTRAMUSCULAR; INTRAVENOUS at 20:32

## 2022-01-01 RX ADMIN — HYDROMORPHONE HYDROCHLORIDE 1.5 MG: 2 INJECTION, SOLUTION INTRAMUSCULAR; INTRAVENOUS; SUBCUTANEOUS at 04:22

## 2022-01-01 RX ADMIN — GLYCOPYRROLATE 0.4 MG: 0.2 INJECTION INTRAMUSCULAR; INTRAVENOUS at 20:50

## 2022-01-01 RX ADMIN — HYDROMORPHONE HYDROCHLORIDE 1 MG: 1 INJECTION, SOLUTION INTRAMUSCULAR; INTRAVENOUS; SUBCUTANEOUS at 13:25

## 2022-01-01 RX ADMIN — MORPHINE SULFATE 4 MG: 4 INJECTION, SOLUTION INTRAMUSCULAR; INTRAVENOUS at 01:18

## 2022-01-01 RX ADMIN — DOCUSATE SODIUM 50MG AND SENNOSIDES 8.6MG 2 TABLET: 8.6; 5 TABLET, FILM COATED ORAL at 21:19

## 2022-01-01 RX ADMIN — LORAZEPAM 2 MG: 2 INJECTION INTRAMUSCULAR at 12:19

## 2022-01-01 RX ADMIN — HYDROMORPHONE HYDROCHLORIDE 1 MG: 1 INJECTION, SOLUTION INTRAMUSCULAR; INTRAVENOUS; SUBCUTANEOUS at 20:29

## 2022-01-01 RX ADMIN — GLYCOPYRROLATE 0.4 MG: 0.2 INJECTION INTRAMUSCULAR; INTRAVENOUS at 13:07

## 2022-01-01 RX ADMIN — POLYETHYLENE GLYCOL 3350 17 G: 17 POWDER, FOR SOLUTION ORAL at 09:42

## 2022-01-01 RX ADMIN — HYDROMORPHONE HYDROCHLORIDE 1.5 MG: 2 INJECTION, SOLUTION INTRAMUSCULAR; INTRAVENOUS; SUBCUTANEOUS at 13:17

## 2022-01-01 RX ADMIN — GLYCOPYRROLATE 0.4 MG: 0.2 INJECTION INTRAMUSCULAR; INTRAVENOUS at 00:59

## 2022-01-01 RX ADMIN — GLYCOPYRROLATE 0.4 MG: 0.2 INJECTION INTRAMUSCULAR; INTRAVENOUS at 04:22

## 2022-01-01 RX ADMIN — HYDROMORPHONE HYDROCHLORIDE 1.5 MG: 2 INJECTION, SOLUTION INTRAMUSCULAR; INTRAVENOUS; SUBCUTANEOUS at 00:29

## 2022-01-01 RX ADMIN — GLYCOPYRROLATE 0.4 MG: 0.2 INJECTION INTRAMUSCULAR; INTRAVENOUS at 01:17

## 2022-01-01 RX ADMIN — ASPIRIN 81 MG: 81 TABLET, COATED ORAL at 09:17

## 2022-01-01 RX ADMIN — LORAZEPAM 2 MG: 2 INJECTION INTRAMUSCULAR; INTRAVENOUS at 04:51

## 2022-01-01 RX ADMIN — LORAZEPAM 2 MG: 2 INJECTION INTRAMUSCULAR; INTRAVENOUS at 13:26

## 2022-01-01 RX ADMIN — OLANZAPINE 2.5 MG: 2.5 TABLET ORAL at 20:22

## 2022-01-01 RX ADMIN — LORAZEPAM 2 MG: 2 INJECTION INTRAMUSCULAR at 21:11

## 2022-01-01 RX ADMIN — CARVEDILOL 6.25 MG: 6.25 TABLET, FILM COATED ORAL at 08:03

## 2022-01-01 RX ADMIN — GLYCOPYRROLATE 0.4 MG: 0.2 INJECTION INTRAMUSCULAR; INTRAVENOUS at 04:51

## 2022-01-01 RX ADMIN — CEFTRIAXONE 1 G: 1 INJECTION, POWDER, FOR SOLUTION INTRAMUSCULAR; INTRAVENOUS at 22:42

## 2022-01-01 RX ADMIN — ROCURONIUM BROMIDE 30 MG: 50 INJECTION INTRAVENOUS at 07:49

## 2022-01-01 RX ADMIN — NIFEDIPINE 30 MG: 30 TABLET, FILM COATED, EXTENDED RELEASE ORAL at 21:37

## 2022-01-01 RX ADMIN — HYDROMORPHONE HYDROCHLORIDE 1.5 MG: 2 INJECTION, SOLUTION INTRAMUSCULAR; INTRAVENOUS; SUBCUTANEOUS at 04:52

## 2022-01-01 RX ADMIN — GLYCOPYRROLATE 0.4 MG: 0.2 INJECTION INTRAMUSCULAR; INTRAVENOUS at 04:39

## 2022-01-01 RX ADMIN — EPHEDRINE SULFATE 10 MG: 50 INJECTION INTRAVENOUS at 08:09

## 2022-01-01 RX ADMIN — LORAZEPAM 2 MG: 2 INJECTION INTRAMUSCULAR at 16:20

## 2022-01-01 RX ADMIN — GLYCOPYRROLATE 0.4 MG: 0.2 INJECTION INTRAMUSCULAR; INTRAVENOUS at 04:40

## 2022-01-01 RX ADMIN — LORAZEPAM 2 MG: 2 INJECTION INTRAMUSCULAR; INTRAVENOUS at 05:00

## 2022-01-01 RX ADMIN — NIFEDIPINE 30 MG: 30 TABLET, FILM COATED, EXTENDED RELEASE ORAL at 21:19

## 2022-01-01 RX ADMIN — DONEPEZIL HYDROCHLORIDE 5 MG: 5 TABLET, FILM COATED ORAL at 21:37

## 2022-01-01 RX ADMIN — SODIUM CHLORIDE, POTASSIUM CHLORIDE, SODIUM LACTATE AND CALCIUM CHLORIDE 9 ML/HR: 600; 310; 30; 20 INJECTION, SOLUTION INTRAVENOUS at 07:21

## 2022-01-01 RX ADMIN — HYDROMORPHONE HYDROCHLORIDE 1 MG: 1 INJECTION, SOLUTION INTRAMUSCULAR; INTRAVENOUS; SUBCUTANEOUS at 20:31

## 2022-01-01 RX ADMIN — MORPHINE SULFATE 4 MG: 4 INJECTION, SOLUTION INTRAMUSCULAR; INTRAVENOUS at 05:00

## 2022-01-01 RX ADMIN — HYDROMORPHONE HYDROCHLORIDE 1.5 MG: 2 INJECTION, SOLUTION INTRAMUSCULAR; INTRAVENOUS; SUBCUTANEOUS at 13:50

## 2022-01-01 RX ADMIN — OLANZAPINE 2.5 MG: 2.5 TABLET ORAL at 09:17

## 2022-01-01 RX ADMIN — SCOPALAMINE 1 PATCH: 1 PATCH, EXTENDED RELEASE TRANSDERMAL at 18:12

## 2022-01-01 RX ADMIN — Medication 10 ML: at 22:08

## 2022-01-01 RX ADMIN — MORPHINE SULFATE 4 MG: 4 INJECTION, SOLUTION INTRAMUSCULAR; INTRAVENOUS at 20:57

## 2022-01-01 RX ADMIN — OLANZAPINE 2.5 MG: 2.5 TABLET ORAL at 08:04

## 2022-01-01 RX ADMIN — LORAZEPAM 2 MG: 2 INJECTION INTRAMUSCULAR; INTRAVENOUS at 08:24

## 2022-01-14 NOTE — PROGRESS NOTES
Date of Office Visit: 2022  Encounter Provider: Christine Camacho, VINNIE, APRN  Place of Service: [facility] CARDIOLOGY  Patient Name: Christine Wharton  :1932        Subjective:     Chief Complaint:  Coronary artery disease, hypertension      History of Present Illness:  Christine Wharton is a 89 y.o. female patient of Dr. Hernandez.  This patient is new to me and I have reviewed her records.    Patient has a history of coronary artery disease, hypertension, hyperlipidemia, dementia, carotid artery disease, chronic kidney disease, hypothyroidism.    Patient was previously followed by Dr. Yip at [place].  She has a history of coronary artery disease of dominant RCA and small obtuse marginal branch per coronary angiogram she had in the past.  Also with history of bilateral carotid artery disease less than 70%.  She was seen in the office by Dr. Hernandez 2020.  Her  and son are helping with history given her advanced dementia.  She would occasionally do some walking without any significant chest pain or shortness of breath symptoms.  Blood pressure has been well controlled.  She reportedly had had a normal stress test back in .  6-month follow-up was recommended.      Patient presents to office today for follow-up appointment.  Patient has advanced Alzheimer's dementia.  Her  and caregiver is with her in the office today, per patient preference.  This week they report that she is feeling well since last visit.  No reports of chest pain or discomfort, shortness of breath, shortness of breath with exertion, palpitations, racing heartbeat sensation, lower extremity edema, syncope, near syncope, falls, fatigue, or abnormal bleeding.  Blood pressure at home stays in the 120s systolic, feeling good at this level.  Of note, patient's  reports that she has been on nifedipine twice a day for many years and they need to ensure that prescription is filled this way so  she can continue this.        Past Medical History:   Diagnosis Date   • Cataract July 2020   • Chronic kidney disease     Stage 3 (moderate)   • Coronary atherosclerosis     CAD   • HL (hearing loss)    • Hypercholesteremia    • Hypertension    • Hypothyroidism    • Renal insufficiency      Past Surgical History:   Procedure Laterality Date   • SUBTOTAL HYSTERECTOMY     • TONSILLECTOMY       Outpatient Medications Prior to Visit   Medication Sig Dispense Refill   • allopurinol (ZYLOPRIM) 100 MG tablet TAKE ONE TABLET BY MOUTH DAILY 90 tablet 1   • aspirin 81 MG tablet Take 1 tablet by mouth daily.     • carvedilol (COREG) 25 MG tablet TAKE ONE TABLET BY MOUTH TWICE A  tablet 0   • donepezil (ARICEPT) 10 MG tablet TAKE ONE TABLET BY MOUTH ONCE NIGHTLY 30 tablet 3   • levothyroxine (Synthroid) 125 MCG tablet Take 1 tablet by mouth Daily. 90 tablet 1   • rosuvastatin (CRESTOR) 5 MG tablet Take 0.5 tablets by mouth Daily. 15 tablet 11   • sertraline (ZOLOFT) 50 MG tablet TAKE ONE TABLET BY MOUTH DAILY 30 tablet 3   • NIFEdipine XL (Procardia XL) 30 MG 24 hr tablet Take 1 tablet by mouth Daily. 30 tablet 0   • mometasone (ELOCON) 0.1 % ointment        No facility-administered medications prior to visit.       Allergies as of 01/14/2022 - Reviewed 07/13/2021   Allergen Reaction Noted   • Statins  02/04/2016   • Sulfa antibiotics  02/04/2016     Social History     Socioeconomic History   • Marital status:    Tobacco Use   • Smoking status: Never Smoker   • Smokeless tobacco: Never Used   Substance and Sexual Activity   • Alcohol use: No   • Drug use: No   • Sexual activity: Never     No family history on file.    Review of Systems   Constitutional: Negative for malaise/fatigue.   Cardiovascular:        SEE HPI   Respiratory: Negative for shortness of breath.    Hematologic/Lymphatic: Negative for bleeding problem.   Musculoskeletal: Negative for falls.   Gastrointestinal: Negative for melena.  "  Genitourinary: Negative for hematuria.   Psychiatric/Behavioral: Negative for altered mental status.          Objective:     Vitals:    01/14/22 1325   BP: 118/78   BP Location: Right arm   Patient Position: Sitting   Cuff Size: Adult   Pulse: 62   Resp: 15   SpO2: 99%   Weight: 45.4 kg (100 lb)   Height: 152.4 cm (60\")     Body mass index is 19.53 kg/m².      PHYSICAL EXAM:  Constitutional:       General: Not in acute distress.     Appearance: Well-developed. Not diaphoretic.   Eyes:      Pupils: Pupils are equal, round, and reactive to light.   HENT:      Head: Normocephalic and atraumatic.   Neck:      Vascular: No JVD.   Pulmonary:      Effort: Pulmonary effort is normal. No respiratory distress.      Breath sounds: Normal breath sounds. No wheezing. No rales.   Cardiovascular:      Normal rate. Regular rhythm.      Murmurs: There is no murmur.      No gallop. No click. No rub.   Edema:     Peripheral edema absent.   Abdominal:      General: Bowel sounds are normal. There is no distension.      Palpations: Abdomen is soft.   Musculoskeletal:         General: No tenderness or deformity.      Cervical back: Neck supple. Skin:     General: Skin is warm and dry.      Findings: No erythema or rash.   Neurological:      Mental Status: Alert. Mental status is at baseline.             ECG 12 Lead    Date/Time: 1/14/2022 1:59 PM  Performed by: Christine Camacho DNP, APRN  Authorized by: Christine Camacho DNP, APRN   Comparison: compared with previous ECG from 12/15/2020  Comparison to previous ECG: PACs present on EKG today  Rhythm: sinus rhythm  Ectopy: atrial premature contractions  BPM: 55  QRS axis: left  Comments: No significant changes from previous EKG.              Assessment:       Diagnosis Plan   1. Coronary artery disease involving native coronary artery of native heart without angina pectoris     2. Benign essential HTN           Plan:     1. Coronary artery disease: Denies anginal symptoms.  On statin, " low-dose aspirin, carvedilol.  2. Hypertension: Blood pressure at home stays 120s systolic and she feels good at this level.  She has been taking nifedipine twice a day for years, will refill and to reflect this.  They will continue to monitor and call for systolic blood pressure less than 110 or if she develops any symptoms or concerns at higher readings.  They will also call for any low HR readings.   3. Hyperlipidemia  4. Advanced dementia  5. Chronic kidney disease    Patient to follow-up with Dr. Hernandez in 6 months or sooner if needed for any new, recurrent, or worsening symptoms or other issues or concerns.  Discussed in detail signs/symptoms that warrant sooner call or follow-up to the office or ER visit.        Records reviewed including but not limited to 12/2020 EKG, 2016 stress test, 7/2021 CBC and CMP             Your medication list          Accurate as of January 14, 2022  2:23 PM. If you have any questions, ask your nurse or doctor.            CHANGE how you take these medications      Instructions Last Dose Given Next Dose Due   NIFEdipine XL 30 MG 24 hr tablet  Commonly known as: Procardia XL  What changed: when to take this  Changed by: Christine Camacho, DNP, APRN      Take 1 tablet by mouth 2 (Two) Times a Day.          CONTINUE taking these medications      Instructions Last Dose Given Next Dose Due   allopurinol 100 MG tablet  Commonly known as: ZYLOPRIM      TAKE ONE TABLET BY MOUTH DAILY       aspirin 81 MG tablet      Take 1 tablet by mouth daily.       carvedilol 25 MG tablet  Commonly known as: COREG      TAKE ONE TABLET BY MOUTH TWICE A DAY       donepezil 10 MG tablet  Commonly known as: ARICEPT      TAKE ONE TABLET BY MOUTH ONCE NIGHTLY       levothyroxine 125 MCG tablet  Commonly known as: Synthroid      Take 1 tablet by mouth Daily.       mometasone 0.1 % ointment  Commonly known as: ELOCON           rosuvastatin 5 MG tablet  Commonly known as: CRESTOR      Take 0.5 tablets by  mouth Daily.       sertraline 50 MG tablet  Commonly known as: ZOLOFT      TAKE ONE TABLET BY MOUTH DAILY             Where to Get Your Medications      Information about where to get these medications is not yet available    Ask your nurse or doctor about these medications  · NIFEdipine XL 30 MG 24 hr tablet         The above medication changes may not have been made by this provider.  Patient's medication list was updated to reflect medications they are currently taking including medication changes made by other providers.            Thanks,    Christine Camacho, DNP, APRN  01/14/2022         Dictated utilizing Dragon dictation

## 2022-02-24 NOTE — TELEPHONE ENCOUNTER
[facility] called requesting carvedilol 6.25mg 2 tabs po BID.  Please advise.  Her chart says 25mg po BID.  [facility]

## 2022-02-24 NOTE — TELEPHONE ENCOUNTER
Caller: CHAVA BOWEN [address] [place] [address], [place] [address] [facility] [phone] [facility] [phone] FX    Relationship: Pharmacy    Best call back number: [phone]    Requested Prescriptions:       allopurinol (ZYLOPRIM) 100 MG tablet         Pharmacy where request should be sent:    CHAVA BOWEN [address] [place] [address], [place] [address] [facility] [phone] [place] [phone] FX  [phone]      Does the patient have less than a 3 day supply:  [x] Yes  [] No    Viktoria Carlin, RegSched Rep   02/24/22 14:37 EST     PLEASE ADVISE.

## 2022-02-28 NOTE — TELEPHONE ENCOUNTER
Spoke w pts , confirmed carvedilol dose 6.25mg 1 tab po BID-discard 11/8/22.  He states she has been taking this dose for a long time.     [facility]

## 2022-05-08 NOTE — ED NOTES
Pt to ED from [facility] via [facility] with c/o witnessed fall.  Pt reports hitting her head, -LOC, no blood thinners. Pt has hx of dementia, A&ox1, at baseline.    Pt wearing mask, staff wearing appropriate PPE.

## 2022-05-08 NOTE — ED PROVIDER NOTES
EMERGENCY DEPARTMENT ENCOUNTER  I wore full protective equipment throughout this patient encounter including a N95 mask, eye shield, gown and gloves. Hand hygiene was performed before donning protective equipment and after removal when leaving the room.    Room Number:  04/04  Date of encounter:  5/8/2022  PCP: Louis Valdez MD    HPI:  Context: Christine Wharton is a 89 y.o. female who presents to the ED c/o chief complaint of fall.  Patient from [facility] care facility, history of dementia, unable to give history.  History supplied by EMS.  EMS reports that patient had a mechanical fall, witnessed.  Patient did strike her head, no loss of consciousness.  EMS reports that patient complained of headache earlier, patient denying headache at present.  Patient currently denies any complaints other than being hungry.    MEDICAL HISTORY REVIEW  Reviewed in EPIC    PAST MEDICAL HISTORY  Active Ambulatory Problems     Diagnosis Date Noted   • Atypical chest pain 03/23/2016   • Benign essential HTN 03/23/2016   • Bronchitis 03/23/2016   • Coronary artery disease involving native coronary artery of native heart without angina pectoris 03/23/2016   • Carpal tunnel syndrome 03/23/2016   • Fatigue 03/23/2016   • Arthralgia of hip 03/23/2016   • HLD (hyperlipidemia) 03/23/2016   • Amyotrophia 03/23/2016   • Adult hypothyroidism 03/23/2016   • Memory loss 03/23/2016   • Pharyngeal inflammation 03/23/2016   • Basal cell papilloma 03/23/2016   • Forgetfulness 03/23/2016   • Chronic kidney disease, stage III (moderate) (HCC) 06/24/2016   • Night sweats 08/22/2016   • Hot flashes 08/22/2016   • Urine frequency 08/22/2016   • Gastroesophageal reflux disease with esophagitis 10/13/2016   • Dyslipidemia 10/13/2016   • Chronic gout 10/13/2016   • Late onset Alzheimer's disease without behavioral disturbance (HCC) 10/11/2019     Resolved Ambulatory Problems     Diagnosis Date Noted   • BP (high blood pressure) 03/23/2016     Past Medical  History:   Diagnosis Date   • Cataract July 2020   • Chronic kidney disease    • Coronary atherosclerosis    • HL (hearing loss)    • Hypercholesteremia    • Hypertension    • Hypothyroidism    • Renal insufficiency        PAST SURGICAL HISTORY  Past Surgical History:   Procedure Laterality Date   • SUBTOTAL HYSTERECTOMY     • TONSILLECTOMY         FAMILY HISTORY  No family history on file.    SOCIAL HISTORY  Social History     Socioeconomic History   • Marital status:    Tobacco Use   • Smoking status: Never Smoker   • Smokeless tobacco: Never Used   Substance and Sexual Activity   • Alcohol use: No   • Drug use: No   • Sexual activity: Never       ALLERGIES  Statins and Sulfa antibiotics    The patient's allergies have been reviewed    REVIEW OF SYSTEMS  Unable to obtain review of systems secondary to dementia    PHYSICAL EXAM  I have reviewed the triage vital signs and nursing notes.  ED Triage Vitals [05/07/22 2023]   Temp Heart Rate Resp BP SpO2   98.8 °F (37.1 °C) 52 16 (!) 185/62 98 %      Temp src Heart Rate Source Patient Position BP Location FiO2 (%)   Tympanic -- -- -- --       General: No acute distress.  HENT: NCAT, PERRL, Nares patent.  Eyes: no scleral icterus.  Neck: trachea midline, no ROM limitations.Cervical spine: No step-offs or deformities, no midline tenderness, full range of motion.  CV: regular rhythm, regular rate.  Respiratory: normal effort, CTAB.  Abdomen: soft, nondistended, NTTP, no rebound tenderness, no guarding or rigidity.  Musculoskeletal: no deformity.  Neuro: Alert and oriented, no facial droop, speech clear, no dysarthria or aphasia, moves all extremities well, sensation intact light touch all extremities, no focal deficits  Skin: warm, dry.    LAB RESULTS  No results found for this or any previous visit (from the past 24 hour(s)).    I ordered the above labs and reviewed the results.    RADIOLOGY  CT Head Without Contrast    Result Date: 5/7/2022  CT HEAD WO CONTRAST-   CLINICAL HISTORY: Patient fell. Head trauma.  TECHNIQUE: Transverse 3 mm thick images were obtained from the base of the skull to the vertex without IV contrast.  Radiation dose reduction techniques were utilized, including automated exposure control and exposure modulation based on body size.  COMPARISON: CT head dated 02/05/2017.  FINDINGS: There is moderate diffuse cortical atrophy and there is also moderate enlargement of the lateral and third ventricles due to central atrophy that shows significant progression since the preceding CT scan. Ill-defined diminished attenuation in the periventricular white matter consistent with sequela of small vessel chronic ischemic change is also more prominent. There is no evidence of acute infarct or hemorrhage. There is no mass effect. Bone window images show no evidence of skull fracture. The visualized paranasal sinuses and mastoid air cells and middle ear cavities are well aerated.      Cortical atrophy and evidence of central atrophy is significant worsening since a CT dated 02/05/2017. No acute intracranial abnormalities identified.  This report was finalized on 5/7/2022 10:43 PM by Dr. Ricci Alonso M.D.      CT Cervical Spine Without Contrast    Result Date: 5/7/2022  CT CERVICAL SPINE WO CONTRAST-  CLINICAL HISTORY: Patient fell. Neck pain.  TECHNIQUE: Multiple axial 1 mm thick images were obtained through the cervical spine. Coronal and sagittal reconstructions were produced.  Radiation dose reduction techniques were utilized, including automated exposure control and exposure modulation based on body size.  COMPARISON: None  FINDINGS: There is straightening of the normal cervical lordosis. All of the cervical vertebral bodies are normal in height. The facet joints are narrowed but appear intact. There is no evidence of acute fracture or subluxation. There is marked disc space narrowing at C3-C4 and C4-C5 and C5-C6. However, there is no significant disc bulging or  focal disc herniation at any level. There is no significant encroachment on spinal canal      Degenerative disc changes as noted. No acute fracture or subluxation is identified.  This report was finalized on 5/7/2022 10:47 PM by Dr. Ricci Alonso M.D.        I ordered the above noted radiological studies. I reviewed the images and results. I agree with the radiologist interpretation.    PROCEDURES  Procedures    MEDICATIONS GIVEN IN ER  Medications   ziprasidone (GEODON) injection 10 mg (10 mg Intramuscular Given 5/7/22 2131)       PROGRESS, DATA ANALYSIS, CONSULTS, AND MEDICAL DECISION MAKING  A complete history and physical exam have been performed.  All available laboratory and imaging results have been reviewed by myself prior to disposition.    MDM  After the initial H&P, I discussed pertinent information from history and physical exam with patient/family.  Discussed differential diagnosis.  Discussed plan for ED evaluation/workup/treatment.  All questions answered.  Patient/family is agreeable with plan.  ED Course as of 05/08/22 1730   Sat May 07, 2022   2041 Patient presents with fall with closed head injury.  Patient denies any complaints at present.  Given patient's age, high risk of morbidity and mortality, obtaining CT head imaging.  Patient is also risk of occult C-spine fracture, obtaining CT imaging. [JG]   2045 BP(!): 185/62 [RC]   2045 Temp: 98.8 °F (37.1 °C) [RC]   2045 Heart Rate: 52 [RC]   2045 Resp: 16 [RC]   2045 SpO2: 98 % [RC]   2124 Patient becoming combative with staff.  Attempting to get out of bed.  I am afraid she is going to fall and harm her self at this time.  We will go ahead and order a dose of Geodon and attempt to calm the patient down. [RC]   2326 CT head and C-spine show no acute process.  Patient is resting quietly at this time and in no acute distress.  Again remainder of musculoskeletal exam appears unremarkable.  She appears safe for discharge back to her nursing facility.   We will have them to return her to the emergency department should the patient develop any new symptoms or should they have any further concerns. [RC]   0864 We are awaiting ambulance ride back to the nursing facility at this time. [RC]      ED Course User Index  [JG] Ran Osuna MD  [RC] Juan José Sloan III, PA       AS OF 17:30 EDT VITALS:    BP - (!) 185/72  HR - 56  TEMP - 98.8 °F (37.1 °C) (Tympanic)  O2 SATS - 95%    DIAGNOSIS  Final diagnoses:   Fall, initial encounter   Closed head injury, initial encounter   Dementia without behavioral disturbance, unspecified dementia type (HCC)   Hypertension, unspecified type         DISPOSITION  DISCHARGE    Patient discharged in stable condition.    Reviewed implications of results, diagnosis, meds, responsibility to follow up, warning signs and symptoms of possible worsening, potential complications and reasons to return to ER.    Patient/Family voiced understanding of above instructions.    Discussed plan for discharge, as there is no emergent indication for admission. Patient referred to primary care provider for BP management due to today's BP. Pt/family is agreeable and understands need for follow up and repeat testing.  Pt is aware that discharge does not mean that nothing is wrong but it indicates no emergency is present that requires admission and they must continue care with follow-up as given below or physician of their choice.     FOLLOW-UP  Louis Valdez MD  [address] [facility] [address] [phone]    Schedule an appointment as soon as possible for a visit in 2 days  even if well         Medication List      Changed    carvedilol 6.25 MG tablet  Commonly known as: COREG  Take 1 tablet by mouth 2 (Two) Times a Day.  What changed: how much to take     donepezil 10 MG tablet  Commonly known as: ARICEPT  TAKE ONE TABLET BY MOUTH ONCE NIGHTLY  What changed: how much to take     Synthroid 125 MCG tablet  Generic drug:  levothyroxine  TAKE ONE TABLET BY MOUTH DAILY  What changed:   · how much to take  · additional instructions             Ran Osuna MD  05/08/22 9441

## 2022-05-08 NOTE — ED PROVIDER NOTES
EMERGENCY DEPARTMENT ENCOUNTER    Room Number:  04/04  Date of encounter:  5/9/2022  PCP: Louis Valdez MD  Historian: EMS      HPI:  Chief Complaint: Witnessed fall  A complete HPI/ROS/PMH/PSH/SH/FH are unobtainable due to: Patient is demented  Context: Christine Wharton is a 89 y.o. female who presents to the ED c/o a witnessed fall that occurred at the nursing home.  It is said that the fall was mechanical in nature.  She did strike her head but there was no LOC.  According to EMS the patient to be complaining of a headache prior to the fall.  She currently denies any other pain or symptoms.  She does states she is hungry.      PAST MEDICAL HISTORY  Active Ambulatory Problems     Diagnosis Date Noted   • Atypical chest pain 03/23/2016   • Benign essential HTN 03/23/2016   • Bronchitis 03/23/2016   • Coronary artery disease involving native coronary artery of native heart without angina pectoris 03/23/2016   • Carpal tunnel syndrome 03/23/2016   • Fatigue 03/23/2016   • Arthralgia of hip 03/23/2016   • HLD (hyperlipidemia) 03/23/2016   • Amyotrophia 03/23/2016   • Adult hypothyroidism 03/23/2016   • Memory loss 03/23/2016   • Pharyngeal inflammation 03/23/2016   • Basal cell papilloma 03/23/2016   • Forgetfulness 03/23/2016   • Chronic kidney disease, stage III (moderate) (HCC) 06/24/2016   • Night sweats 08/22/2016   • Hot flashes 08/22/2016   • Urine frequency 08/22/2016   • Gastroesophageal reflux disease with esophagitis 10/13/2016   • Dyslipidemia 10/13/2016   • Chronic gout 10/13/2016   • Late onset Alzheimer's disease without behavioral disturbance (HCC) 10/11/2019     Resolved Ambulatory Problems     Diagnosis Date Noted   • BP (high blood pressure) 03/23/2016     Past Medical History:   Diagnosis Date   • Cataract July 2020   • Chronic kidney disease    • Coronary atherosclerosis    • HL (hearing loss)    • Hypercholesteremia    • Hypertension    • Hypothyroidism    • Renal insufficiency          PAST  SURGICAL HISTORY  Past Surgical History:   Procedure Laterality Date   • SUBTOTAL HYSTERECTOMY     • TONSILLECTOMY           FAMILY HISTORY  No family history on file.      SOCIAL HISTORY  Social History     Socioeconomic History   • Marital status:    Tobacco Use   • Smoking status: Never Smoker   • Smokeless tobacco: Never Used   Substance and Sexual Activity   • Alcohol use: No   • Drug use: No   • Sexual activity: Never         ALLERGIES  Statins and Sulfa antibiotics        REVIEW OF SYSTEMS  Review of Systems   Constitutional: Negative for chills and fever.   HENT: Negative.    Eyes: Negative.    Respiratory: Negative for cough and shortness of breath.    Cardiovascular: Negative for chest pain and palpitations.   Gastrointestinal: Negative.    Genitourinary: Negative.    Musculoskeletal: Negative.    Skin: Negative.    Neurological: Negative.    Psychiatric/Behavioral: Negative.         All systems reviewed and negative except for those discussed in HPI.       PHYSICAL EXAM    I have reviewed the triage vital signs and nursing notes.    ED Triage Vitals [05/07/22 2023]   Temp Heart Rate Resp BP SpO2   98.8 °F (37.1 °C) 52 16 (!) 185/62 98 %      Temp src Heart Rate Source Patient Position BP Location FiO2 (%)   Tympanic -- -- -- --       Physical Exam  GENERAL: Somewhat frail appearance but nontoxic   HENT: nares patent, NCAT, extremities moist, face symmetric  EYES: no scleral icterus, PERRL  CV: regular rhythm, regular rate, normal S1-S2  RESPIRATORY: normal effort  ABDOMEN: soft, nontender  MUSCULOSKELETAL: no deformity, patient moves all extremities without any signs of pain.  NEURO: alert and oriented to self, moves all extremities, follows commands, no obvious focal neurodeficits.  SKIN: warm, dry        LAB RESULTS  No results found for this or any previous visit (from the past 24 hour(s)).    Ordered the above labs and independently reviewed the results.        RADIOLOGY  No Radiology Exams  Resulted Within Past 24 Hours    I ordered the above noted radiological studies. Reviewed by me and discussed with radiologist.  See dictation for official radiology interpretation.      PROCEDURES    Procedures      MEDICATIONS GIVEN IN ER    Medications   ziprasidone (GEODON) injection 10 mg (10 mg Intramuscular Given 5/7/22 2131)         PROGRESS, DATA ANALYSIS, CONSULTS, AND MEDICAL DECISION MAKING    All labs have been independently reviewed by me.  All radiology studies have been reviewed by me and discussed with radiologist dictating the report.   EKG's independently viewed and interpreted by me.  Discussion below represents my analysis of pertinent findings related to patient's condition, differential diagnosis, treatment plan and final disposition.    DDx includes but is not limited to: Head injury with intracranial hemorrhage, head injury without a cranial hemorrhage, cervical strain, cervical fracture.  Will obtain CT head and C-spine without contrast to further evaluate.  Please see Dr. Galindo's note for further details.    ED Course as of 05/09/22 0635   Sat May 07, 2022   2041 Patient presents with fall with closed head injury.  Patient denies any complaints at present.  Given patient's age, high risk of morbidity and mortality, obtaining CT head imaging.  Patient is also risk of occult C-spine fracture, obtaining CT imaging. [JG]   2045 BP(!): 185/62 [RC]   2045 Temp: 98.8 °F (37.1 °C) [RC]   2045 Heart Rate: 52 [RC]   2045 Resp: 16 [RC]   2045 SpO2: 98 % [RC]   2124 Patient becoming combative with staff.  Attempting to get out of bed.  I am afraid she is going to fall and harm her self at this time.  We will go ahead and order a dose of Geodon and attempt to calm the patient down. [RC]   2326 CT head and C-spine show no acute process.  Patient is resting quietly at this time and in no acute distress.  Again remainder of musculoskeletal exam appears unremarkable.  She appears safe for discharge back  to her nursing facility.  We will have them to return her to the emergency department should the patient develop any new symptoms or should they have any further concerns. [RC]   5985 We are awaiting ambulance ride back to the nursing facility at this time. [RC]      ED Course User Index  [JG] Ran Osuna MD  [RC] Juan José Sloan III PA           PPE: The patient wore a surgical mask throughout the entire patient encounter. I wore an N95.    AS OF 06:35 EDT VITALS:    BP - (!) 185/72  HR - 56  TEMP - 98.8 °F (37.1 °C) (Tympanic)  O2 SATS - 95%        DIAGNOSIS  Final diagnoses:   Fall, initial encounter   Closed head injury, initial encounter   Dementia without behavioral disturbance, unspecified dementia type (HCC)   Hypertension, unspecified type         DISPOSITION  DISCHARGE    Patient discharged in stable condition.    Reviewed implications of results, diagnosis, meds, responsibility to follow up, warning signs and symptoms of possible worsening, potential complications and reasons to return to ER.    Patient/Family voiced understanding of above instructions.    Discussed plan for discharge, as there is no emergent indication for admission. Patient referred to primary care provider for BP management due to today's BP. Pt/family is agreeable and understands need for follow up and repeat testing.  Pt is aware that discharge does not mean that nothing is wrong but it indicates no emergency is present that requires admission and they must continue care with follow-up as given below or physician of their choice.     FOLLOW-UP  Louis Valdez MD  [address] [facility] [address] [phone]    Schedule an appointment as soon as possible for a visit in 2 days  even if well         Medication List      Changed    carvedilol 6.25 MG tablet  Commonly known as: COREG  Take 1 tablet by mouth 2 (Two) Times a Day.  What changed: how much to take     donepezil 10 MG tablet  Commonly known as:  ARICEPT  TAKE ONE TABLET BY MOUTH ONCE NIGHTLY  What changed: how much to take     Synthroid 125 MCG tablet  Generic drug: levothyroxine  TAKE ONE TABLET BY MOUTH DAILY  What changed:   · how much to take  · additional instructions                   Juan José Sloan III, PA  05/09/22 0670

## 2022-05-08 NOTE — ED NOTES
Pt's  called and states that he was told by Clemencia that pt stated her leg hurt after the fall. Pt denies leg pain, very confused. Antonio Sloan PA-C notified.

## 2022-05-13 PROBLEM — M80.00XA OSTEOPOROSIS WITH PATHOLOGICAL FRACTURE: Status: ACTIVE | Noted: 2022-01-01

## 2022-05-13 PROBLEM — S72.002A CLOSED FRACTURE OF NECK OF LEFT FEMUR, INITIAL ENCOUNTER: Status: ACTIVE | Noted: 2022-01-01

## 2022-05-13 PROBLEM — N39.0 ACUTE UTI (URINARY TRACT INFECTION): Status: ACTIVE | Noted: 2022-01-01

## 2022-05-14 NOTE — ANESTHESIA PROCEDURE NOTES
Airway  Urgency: elective    Date/Time: 5/14/2022 7:51 AM  Airway not difficult    General Information and Staff    Patient location during procedure: OR  Anesthesiologist: Claudio Fierro MD  CRNA/CAA: Betty Mansfield CRNA    Indications and Patient Condition  Indications for airway management: airway protection    Preoxygenated: yes  Mask difficulty assessment: 1 - vent by mask    Final Airway Details  Final airway type: endotracheal airway      Successful airway: ETT  Cuffed: yes   Successful intubation technique: direct laryngoscopy  Facilitating devices/methods: intubating stylet  Endotracheal tube insertion site: oral  Blade: Ariadne  Blade size: 3  ETT size (mm): 7.0  Cormack-Lehane Classification: grade I - full view of glottis  Placement verified by: chest auscultation and capnometry   Measured from: lips  ETT/EBT  to lips (cm): 20  Number of attempts at approach: 1  Assessment: lips, teeth, and gum same as pre-op and atraumatic intubation

## 2022-05-14 NOTE — ANESTHESIA POSTPROCEDURE EVALUATION
Patient: Christine Wharton    Procedure Summary     Date: 05/14/22 Room / Location: [facility] OR [address] [place] [address] MAIN OR    Anesthesia Start: 0744 Anesthesia Stop: 0937    Procedure: LEFT HIP BIPOLAR FOR FEMORAL NECK FRACTURE (Left Hip) Diagnosis:     Surgeons: Jamal Mayfield MD Provider: Claudio Fierro MD    Anesthesia Type: general ASA Status: 3          Anesthesia Type: general    Vitals  Vitals Value Taken Time   /78 05/14/22 1016   Temp 36.4 °C (97.6 °F) 05/14/22 1015   Pulse 81 05/14/22 1018   Resp 16 05/14/22 1015   SpO2 98 % 05/14/22 1018   Vitals shown include unvalidated device data.        Post Anesthesia Care and Evaluation    Patient location during evaluation: bedside  Patient participation: complete - patient participated  Level of consciousness: awake and alert  Pain management: adequate  Airway patency: patent  Anesthetic complications: No anesthetic complications  PONV Status: controlled  Cardiovascular status: blood pressure returned to baseline and acceptable  Respiratory status: acceptable  Hydration status: acceptable

## 2022-05-14 NOTE — ANESTHESIA PREPROCEDURE EVALUATION
Anesthesia Evaluation     Patient summary reviewed and Nursing notes reviewed   no history of anesthetic complications:  NPO Solid Status: > 8 hours  NPO Liquid Status: > 2 hours           Airway   Mallampati: II  TM distance: >3 FB  Neck ROM: full  Dental      Pulmonary    Cardiovascular     ECG reviewed    (+) hypertension, CAD, hyperlipidemia,  carotid artery disease      Neuro/Psych  (+) dementia,    GI/Hepatic/Renal/Endo    (+)   renal disease CRI, thyroid problem hypothyroidism    Musculoskeletal     Abdominal    Substance History      OB/GYN          Other                        Anesthesia Plan    ASA 3     general     intravenous induction     Anesthetic plan, all risks, benefits, and alternatives have been provided, discussed and informed consent has been obtained with: patient.        CODE STATUS:    Code Status (Patient has no pulse and is not breathing): CPR (Attempt to Resuscitate)  Medical Interventions (Patient has pulse or is breathing): Full Support

## 2022-05-17 PROBLEM — F02.818 ALZHEIMER'S DEMENTIA, LATE ONSET, WITH BEHAVIORAL DISTURBANCE (HCC): Status: ACTIVE | Noted: 2022-01-01

## 2022-05-17 PROBLEM — E55.9 VITAMIN D DEFICIENCY: Status: ACTIVE | Noted: 2022-01-01

## 2022-05-17 PROBLEM — G30.1 ALZHEIMER'S DEMENTIA, LATE ONSET, WITH BEHAVIORAL DISTURBANCE (HCC): Status: ACTIVE | Noted: 2022-01-01

## 2022-05-18 PROBLEM — E53.8 FOLATE DEFICIENCY: Status: ACTIVE | Noted: 2022-01-01

## 2022-05-23 PROBLEM — S72.002A CLOSED FRACTURE OF NECK OF LEFT FEMUR, INITIAL ENCOUNTER (HCC): Status: RESOLVED | Noted: 2022-01-01 | Resolved: 2022-01-01

## 2022-05-23 PROBLEM — N39.0 ACUTE UTI (URINARY TRACT INFECTION): Status: RESOLVED | Noted: 2022-01-01 | Resolved: 2022-01-01

## 2022-05-24 PROBLEM — I51.9 LEFT VENTRICULAR DIASTOLIC DYSFUNCTION: Status: ACTIVE | Noted: 2022-01-01

## 2022-05-24 PROBLEM — G31.9 CEREBRAL ATROPHY (HCC): Status: ACTIVE | Noted: 2022-01-01

## 2022-05-24 PROBLEM — G31.1 SENILE DEGENERATION OF BRAIN (HCC): Status: ACTIVE | Noted: 2022-01-01

## 2022-05-24 PROBLEM — I67.9 CEREBROVASCULAR SMALL VESSEL DISEASE: Status: ACTIVE | Noted: 2022-01-01

## 2022-05-24 PROBLEM — S72.002D CLOSED LEFT HIP FRACTURE, WITH ROUTINE HEALING, SUBSEQUENT ENCOUNTER: Status: ACTIVE | Noted: 2022-01-01

## 2022-05-24 PROBLEM — Z51.5 HOSPICE CARE PATIENT: Status: ACTIVE | Noted: 2022-01-01

## 2022-05-24 PROBLEM — F02.80 DEMENTIA DUE TO MEDICAL CONDITION WITHOUT BEHAVIORAL DISTURBANCE (HCC): Status: ACTIVE | Noted: 2022-01-01

## 2022-05-24 PROBLEM — N18.32 STAGE 3B CHRONIC KIDNEY DISEASE (HCC): Status: ACTIVE | Noted: 2022-01-01

## 2022-06-01 NOTE — PLAN OF CARE
Goal Outcome Evaluation:           Progress: declining  Outcome Evaluation: Pt rested well throughout the shift. Pt premedicated prior to q 4 turns and activity with 1.5 mg Dilaudid, 2 mg Ativan, and 0.4 mg Robinol with good effect. Gallardo continued. No family at the bedisde this shift, updated on pt care by [facility]. Will continue comfort care.

## 2022-06-01 NOTE — PROGRESS NOTES
Palliative Care/Hospice Follow Up Note       LOS: 9 days   Patient Care Team:  Salvador Polk MD as Attending Provider (Hospice and Palliative Medicine)    Chief Complaint: Patient unable to provide chief complaint    Interval History:   Patient resting comfortably medicated with turns using Dilaudid Ativan and Robinul.    Palliative Performance Scale  Palliative Performance Scale Score: 10%  [place] Symptom Assessment System Revised  Pain Score: 2   ESAS Tiredness Score: Worst possible tiredness  ESAS Nausea Score: No nausea  ESAS Depression Score: unable to assess  ESAS Anxiety Score: No anxiety  ESAS Drowsiness Score: Worst possible drowsiness  ESAS Lack of Appetite Score: Worst lack of appetite  ESAS Wellbeing Score: unable to assess  ESAS Dyspnea Score: No shortness of breath  ESAS Other Problem Score: unable to assess  ESAS Source of Information: healthcare professional caregiver  ESAS Intervention: medicated/see MAR  ESAS Intervention Response: tolerated      Review of Systems: Review of systems could not be obtained due to patient unresponsive.       Vital Signs  Temp:  [96.8 °F (36 °C)-97 °F (36.1 °C)] 96.8 °F (36 °C)  Heart Rate:  [66-69] 69  Resp:  [12] 12  BP: (101-148)/(62-68) 101/62  Device (Oxygen Therapy): room air SpO2:  [83 %-88 %] 88 %    Physical Exam:  General Appearance:    Not awake and in no acute distress     Throat:   No oral lesions, no thrush, oral mucosa moist     Neck:   No adenopathy, supple, trachea midline   Lungs:     Clear to auscultation, respirations regular, even and not labored      Heart:    Regular rhythm and normal rate   Abdomen:     Occasional bowel sounds, no masses, no organomegaly, soft and non-tender, non-distended     Extremities:   No edema, no cyanosis     Pulses:   Radial pulses palpable and equal bilaterally          Results Review:     I reviewed the patient's new clinical results.    Medication Reviewed.        •  [DISCONTINUED] acetaminophen **OR**  [DISCONTINUED] acetaminophen **OR** acetaminophen  •  atropine  •  Glycerin-Hypromellose-  •  glycopyrrolate **OR** [DISCONTINUED] glycopyrrolate **OR** glycopyrrolate **OR** [DISCONTINUED] glycopyrrolate  •  HYDROmorphone **OR** morphine  •  HYDROmorphone **OR** morphine  •  LORazepam **OR** LORazepam  •  LORazepam **OR** LORazepam  •  LORazepam **OR** LORazepam  •  Scopolamine      Assessment & Plan       Alzheimer's dementia, late onset, with behavioral disturbance (HCC)    Benign essential HTN    Coronary artery disease involving native coronary artery of native heart without angina pectoris    Adult hypothyroidism    Hospice care patient    Stage 3b chronic kidney disease (HCC)    Closed left hip fracture, with routine healing, subsequent encounter    Cerebrovascular small vessel disease    Cerebral atrophy (HCC)    Left ventricular diastolic dysfunction    Mrs Wharton is a 89-year-old female with a known history of dementia chronic kidney disease hypertension hypothyroidism that presented from the nursing home after a fall with left hip fracture and urinary tract infection.  She was treated for urinary tract infection and ultimately underwent a left hemiarthroplasty but her postoperative course was pretty complicated with severe delirium and behavioral disturbances related to her dementia.  Attempts at medical management of these issues was unsuccessful.  Given her further clinical decline recommendations were made to consider palliative care and comfort measures.  After discussion with the family they elected to pursue this level of care.  Nonessential medications have been discontinued she remains on medications for comfort.  Based on her current palliative performance index likely hours to days    --> 6 doses of IV Dilaudid in the last 24 hours for a total of 9.0 mg  --> 6 doses of Ativan in the last 24 hours for total dose of 12.0 mg  --> 6 doses of Robinul in the last 24 hours for total dose of 2.4  mg    Plan for disposition:Continue hospice scatter bed    Israel Barber MD  06/01/22  12:58 EDT

## 2022-06-01 NOTE — PROGRESS NOTES
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 [facility] for symptom management of pain, restlessness and congestion using IV medications, titrating doses as needed to maintain comfort. Patient is meeting criteria for GIP level of care.      Review of Visit: Arrived on unit. Spoke to staff and reviewed Epic notes. Entered room and patient lying in bed on her side in the recovery position.  Unresponsive to touch and to call of name. Color pale to ashen, nailbeds dusky. Bilateral hands and feet mottled and cold. Breathing shallow, unlabored with 02 sat 88% on room air. PPS 10%, oral care only. F/C to BSD with minimal dark yellow urine noted. Peripheral IV site in right hand and intact. Close monitoring for safety, daily [facility] RN visit, comfort care for patient in active dying process who requires IV medication for symptom control. No family present during visit, called and spoke to spouse Cassie per telephone to give update and emotional support provided. Patient has received IV Dilaudid 1.5mg, IV Ativan 2mg and IV Robinul 0.4mg x 7 doses each in the last 24 hours and appears comfortable and peaceful. Will continue to see daily to assess needs, monitor status and offer support.        Gretta Salguero RN  [facility] Visit Nurse

## 2022-06-01 NOTE — PLAN OF CARE
Problem: Adult Inpatient Plan of Care  Goal: Patient-Specific Goal (Individualized)  Outcome: Ongoing, Progressing  Flowsheets (Taken 5/26/2022 0408 by Bindu Trujillo RN)  Patient-Specific Goals (Include Timeframe): to keep comfortable  Individualized Care Needs: premedicate prior to turns.banks and oral care   Goal Outcome Evaluation:  Plan of Care Reviewed With: patient        Progress: no change  Outcome Evaluation: Patient responsive to pain at times. Premedicating with 1.5mg of Dilaudid, 2mg of ativan, and 0.4mg of robinul. Turning Q4 for comfort. FC in place. Will continue to monitor for s/sx of discomfort.

## 2022-06-02 NOTE — PLAN OF CARE
Goal Outcome Evaluation:  Plan of Care Reviewed With: patient  Progress: no change  Outcome summary  Pt remains unresponsive. PRN meds admin prior to care/repositioning to maintain comfort, pt asymptomatic and appears to have rested comfortably through shift. Continue to monitor and tx per POC and MD orders.

## 2022-06-02 NOTE — PROGRESS NOTES
[facility] Visit Report    Christine Wharton  [id number]  6/2/2022    Admission [facility] Dx: Yes    Reason for [facility] Admission: Patient has a hospice diagnosis of senile degeneration of the brain, transitioned to comfort care only on 5/23/22.     Symptom  Management: Pain, restlessness and congestion    Nursing/Medication Recommendations: Continue to provide comfort care as ordered. Contact [facility] with any questions, concerns and at time of death at [phone].     [facility] Discharge Plans: None at this time. If patient's condition was to stabilize patient may need LTC placement for on-going daily care needs or transfer to the [facility] if patient is requiring further IV symptom management.     Collaboration: Reviewed chart in Epic and received update from facility RN Shameka who reports patient is unresponsive, medicating prior to turns/care using IV Dilaudid and IV Ativan to maintain as she is declining and resting comfortably.     Review of Visit: PPS 10% bed bound with oral care only. Upon entering room, patient is alone, resting in bed on her right side with open mouth breathing and occasional mild rhonchi to upper airway. Patient is completely unresponsive to tactile stimulation, pale in color with bruising to hands and arms with swelling to right arm/hand and edema to lower legs and feet. Mild duskiness noted to fingernails, mild redness to palms of hands with mottling and coolness to feet. Mepilex also noted to coccyx area - YVROSE. Respirations are even and unlabored with diminished lung sounds, no congestion and on room air - 78%. Abdomen is soft, non-distended with banks in place and secure draining small straw colored urine. Patient appears to be resting fairly comfortably on current medication regimen.     Medications given in the last 24hrs:  Robinul 0.4mg IV Q2hrs prn x 3  Dilaudid 1.5mg IV Q1hr prn x 6  Ativan 2mg IV Q1hr prn x  7  Scopolamine patch TD x 1    Updated patient's  Mr. Matthews by phone, reviewed overall patient assessment, medications and addressed all questions.  is aware patient continues to decline slowly but in the dying phase with no voiced concerns.     Patient appears to be gradually declining, is requiring IV symptom management prior to turns/care in order to maintain comfort while in the dying phase and is meeting GIP criteria. [facility] will continue to monitor closely with daily RN visits, offer support and continue EOL education with family and staff.     Eulalia Dang, HERMELINDA   Scatter Bed Team

## 2022-06-02 NOTE — PLAN OF CARE
Goal Outcome Evaluation:           Progress: no change  Outcome Evaluation: Premed with Dilaudid 1.5mg and Ativan 2mg prior to turns. F/C. Minimal congestion. Dried blood in mouth noted. Scop patch. No family at bedside.

## 2022-06-02 NOTE — PROGRESS NOTES
Palliative Care/Hospice Follow Up Note       LOS: 10 days   Patient Care Team:  Salvador Polk MD as Attending Provider (Hospice and Palliative Medicine)    Chief Complaint: Patient unable to provide chief complaint    Interval History:   Patient resting comfortably medicated with turns using Dilaudid Ativan and Robinul.    Palliative Performance Scale  Palliative Performance Scale Score: 10%  [place] Symptom Assessment System Revised  Pain Score: no pain   ESAS Tiredness Score: Worst possible tiredness  ESAS Nausea Score: No nausea  ESAS Depression Score: No depression  ESAS Anxiety Score: No anxiety  ESAS Drowsiness Score: Worst possible drowsiness  ESAS Lack of Appetite Score: Worst lack of appetite  ESAS Wellbeing Score: Best wellbeing  ESAS Dyspnea Score: No shortness of breath  ESAS Other Problem Score: Best possible response  ESAS Source of Information: healthcare professional caregiver  ESAS Intervention: medicated/see MAR  ESAS Intervention Response: tolerated      Review of Systems: Review of systems could not be obtained due to patient unresponsive.       Vital Signs  Temp:  [96.6 °F (35.9 °C)] 96.6 °F (35.9 °C)  Heart Rate:  [66-68] 66  Resp:  [12] 12  BP: (83)/(46) 83/46  Device (Oxygen Therapy): room air SpO2:  [78 %-89 %] 78 %    Physical Exam:  General Appearance:    Not awake and in no acute distress     Throat:   No oral lesions, no thrush, oral mucosa moist     Neck:   No adenopathy, supple, trachea midline   Lungs:     Clear to auscultation, respirations regular, even and not labored      Heart:    Regular rhythm and normal rate   Abdomen:     Occasional bowel sounds, no masses, no organomegaly, soft and non-tender, non-distended     Extremities:   No edema, no cyanosis     Pulses:   Radial pulses palpable and equal bilaterally          Results Review:     I reviewed the patient's new clinical results.    Medication Reviewed.        •  [DISCONTINUED] acetaminophen **OR** [DISCONTINUED]  acetaminophen **OR** acetaminophen  •  atropine  •  Glycerin-Hypromellose-  •  glycopyrrolate **OR** [DISCONTINUED] glycopyrrolate **OR** glycopyrrolate **OR** [DISCONTINUED] glycopyrrolate  •  HYDROmorphone **OR** morphine  •  HYDROmorphone **OR** morphine  •  LORazepam **OR** LORazepam  •  LORazepam **OR** LORazepam  •  LORazepam **OR** LORazepam  •  Scopolamine      Assessment & Plan       Alzheimer's dementia, late onset, with behavioral disturbance (HCC)    Benign essential HTN    Coronary artery disease involving native coronary artery of native heart without angina pectoris    Adult hypothyroidism    Hospice care patient    Stage 3b chronic kidney disease (HCC)    Closed left hip fracture, with routine healing, subsequent encounter    Cerebrovascular small vessel disease    Cerebral atrophy (HCC)    Left ventricular diastolic dysfunction    Mrs Wharton is a 89-year-old female with a known history of dementia chronic kidney disease hypertension hypothyroidism that presented from the nursing home after a fall with left hip fracture and urinary tract infection.  She was treated for urinary tract infection and ultimately underwent a left hemiarthroplasty but her postoperative course was pretty complicated with severe delirium and behavioral disturbances related to her dementia.  Attempts at medical management of these issues was unsuccessful.  Given her further clinical decline recommendations were made to consider palliative care and comfort measures.  After discussion with the family they elected to pursue this level of care.  Nonessential medications have been discontinued she remains on medications for comfort.  Based on her current palliative performance index likely hours to days    --> 5 doses of IV Dilaudid in the last 24 hours for a total of 7.5 mg  --> 6 doses of Ativan in the last 24 hours for total dose of 12.0 mg  --> 6 doses of Robinul in the last 24 hours for total dose of 2.4 mg    Plan for  disposition:Continue hospice scatter bed    Israel Barber MD  06/02/22  08:39 EDT

## 2022-06-02 NOTE — PROGRESS NOTES
[facility] Visit Report    Christine Wharton  [id number]  2022    This is a [facility] Scattered Bed patient.     MSW provided a follow up visit. Prior to visit, MSW reviewed the pt's chart.     MSW entered the room and provided an introduction. MSW observed the pt lying in bed, no s/s of distress observed. Pt was unresponsive. Pt's son Vaughn at bedside. MSW provided active and compassionate listening as he shared about the pt.     MSW provided education on  bereavement services as well as / CS line and encouraged family to use as needed.     Final arrangements are with [facility].     Discharge plan is end of life care. If pt were to stabilize, LTC.     Family voices no additional needs at this time. MSW available as needed for follow up.         HILDA Kinney

## 2022-06-03 NOTE — PROGRESS NOTES
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     Symptom  Management: Pain, restlessness and congestion  Continue to provide comfort care as ordered.     Nursing/Medication Recommendations: Contact [facility] with any questions, concerns and at time of death at [phone].     [facility]: None at this time. If patient's condition was to stabilize patient may need LTC placement for on-going daily care needs or transfer to the [facility] if patient is requiring further IV symptom management.       Collaboration: Reviewed chart in Epic and received update from facility RN Galo who reports patient is unresponsive, medicating prior to turns/care using IV Dilaudid, Ativan and Robinul to maintain as she is declining and resting comfortably.     Review of Visit: PPS 10% bed bound with oral care only. No family is present, patient is lying in bed, completely unresponsive to voice or touch with open mouth breathing, no s/s pain, restlessness or congestion and appears comfortable. Patient remains mildly pale, skin is warm to touch with edema to upper and lower extremities, mild duskiness to fingernails and mottling with coolness to feet. Respirations remain even, shallow and unlabored, diminished lung sounds, no congestion and room air - 91%. Small straw color urine noted to banks bag. Patient appears to be resting comfortably on current medication regimen.     Medications given in the last 24hrs:  Robinul 0.4mg IV Q2hrs prn x 3  Dilaudid 1.5mg IV Q1hr prn x 6  Ativan 2mg IV Q1hr prn x 6  Scopolamine patch TD x 1    Updated patient's  Mr. Matthews by phone, reviewed overall patient assessment, medications and addressed all questions.   is aware patient continues to decline slowly but in the dying phase with no voiced concerns.     Patient appears to be gradually declining, is requiring IV symptom management prior to turns/care in order to maintain comfort while in the dying phase and is meeting GIP criteria. [facility] will continue to monitor closely with daily RN visits, offer support and continue EOL education with family and staff.       Eulalia Dang RN   Scatter Bed Team

## 2022-06-03 NOTE — PROGRESS NOTES
Palliative Care/Hospice Follow Up Note       LOS: 11 days   Patient Care Team:  Salvador Polk MD as Attending Provider (Hospice and Palliative Medicine)    Chief Complaint: Patient unable to provide chief complaint    Interval History:   Patient resting comfortably medicated with turns using Dilaudid Ativan and Robinul.    Palliative Performance Scale  Palliative Performance Scale Score: 10%  [place] Symptom Assessment System Revised  Pain Score: no pain   ESAS Tiredness Score: Worst possible tiredness  ESAS Nausea Score: No nausea  ESAS Depression Score: No depression  ESAS Anxiety Score: No anxiety  ESAS Drowsiness Score: Worst possible drowsiness  ESAS Lack of Appetite Score: Worst lack of appetite  ESAS Wellbeing Score: unable to assess  ESAS Dyspnea Score: No shortness of breath  ESAS Other Problem Score: unable to assess  ESAS Source of Information: healthcare professional caregiver  ESAS Intervention: other (see comment)  ESAS Intervention Response: tolerated      Review of Systems: Review of systems could not be obtained due to patient unresponsive.       Vital Signs  Temp:  [95.5 °F (35.3 °C)] 95.5 °F (35.3 °C)  Heart Rate:  [63-65] 63  Resp:  [12] 12  BP: (82-90)/(43-47) 90/47  Device (Oxygen Therapy): room air SpO2:  [88 %-91 %] 91 %    Physical Exam:  General Appearance:    Not awake and in no acute distress     Throat:   No oral lesions, no thrush, oral mucosa moist     Neck:   No adenopathy, supple, trachea midline   Lungs:     Clear to auscultation, respirations irregular, sporadic and not labored      Heart:    Regular rhythm and normal rate   Abdomen:     Occasional bowel sounds, no masses, no organomegaly, soft and non-tender, non-distended     Extremities:  Becoming more edematous , no cyanosis some mottling noted     Pulses:   Radial pulses palpable and equal bilaterally          Results Review:     I reviewed the patient's new clinical results.    Medication Reviewed.        •   [DISCONTINUED] acetaminophen **OR** [DISCONTINUED] acetaminophen **OR** acetaminophen  •  atropine  •  Glycerin-Hypromellose-  •  glycopyrrolate **OR** [DISCONTINUED] glycopyrrolate **OR** glycopyrrolate **OR** [DISCONTINUED] glycopyrrolate  •  HYDROmorphone **OR** morphine  •  HYDROmorphone **OR** morphine  •  LORazepam **OR** LORazepam  •  LORazepam **OR** LORazepam  •  LORazepam **OR** LORazepam  •  Scopolamine      Assessment & Plan       Alzheimer's dementia, late onset, with behavioral disturbance (HCC)    Benign essential HTN    Coronary artery disease involving native coronary artery of native heart without angina pectoris    Adult hypothyroidism    Hospice care patient    Stage 3b chronic kidney disease (HCC)    Closed left hip fracture, with routine healing, subsequent encounter    Cerebrovascular small vessel disease    Cerebral atrophy (HCC)    Left ventricular diastolic dysfunction    Mrs Wharton is a 89-year-old female with a known history of dementia chronic kidney disease hypertension hypothyroidism that presented from the nursing home after a fall with left hip fracture and urinary tract infection.  She was treated for urinary tract infection and ultimately underwent a left hemiarthroplasty but her postoperative course was pretty complicated with severe delirium and behavioral disturbances related to her dementia.  Attempts at medical management of these issues was unsuccessful.  Given her further clinical decline recommendations were made to consider palliative care and comfort measures.  After discussion with the family they elected to pursue this level of care.  Nonessential medications have been discontinued she remains on medications for comfort.  Based on her current palliative performance index likely hours to days    --> 6 doses of IV Dilaudid in the last 24 hours for a total of 9.0 mg  --> 6 doses of Ativan in the last 24 hours for total dose of 12.0 mg  --> 3 doses of Robinul in the  last 24 hours for total dose of 1.2 mg    Plan for disposition:Continue hospice scatter bed    Israel Barber MD  06/03/22  08:33 EDT

## 2022-06-03 NOTE — PLAN OF CARE
Goal Outcome Evaluation:  Plan of Care Reviewed With: patient        Progress: declining  Outcome Evaluation: PPS 10%, unresponsive, actively dying. Breathing is shallow, periods of apnea present at times. Pt premedicated with dilaudid and ativan. No audible pulmonary congestion at this time. Pt appears calm and comfortable.

## 2022-06-03 NOTE — PLAN OF CARE
Goal Outcome Evaluation:  Plan of Care Reviewed With: patient        Progress: declining  Outcome Evaluation: Appears comfortable at rest. Medicated q4 prior to turns with ativan, dilaudid, and robinul. No family present tonight. Will continue palliative care

## 2022-06-04 NOTE — PROGRESS NOTES
[facility] Visit Report    Christine Wharton  [id number]  6/4/2022    [facility]    Reason for [facility] Admission: Senile degeneration of the brain    Symptom  Management: pain, restlessness, secretions    Nursing/Medication Recommendations: Continue to provide comfort care as ordered.     Psychosocial Issues and Recommendations: None at present    Spiritual Concerns and Recommendations: None at present    [facility] Discharge Plans: GIP appropriate for administration and titration of parenteral medications and continuous skilled nursing assessments and intervention.  If patient were to stabilize she will likely need LTC with [facility] to follow.     Review of Visit:  Vital Signs:  60 8 101/54    PPS:  10%    Medications in 24 hours:    - 0.4mg IV robinul x 3  - 1mg IV dilaudid x 3  - 1.5mg IV dilaudid x 3  - 2mg IV ativan x 6  - Scopolamine patch applied 6/01 (scheduled to be changed today)     Assessment:  Collaborated with staff HERMELINDA Contreras and reviewed Epic chart prior to visit. Patient is in bed with eyes closed, unresponsive to touch and voice.  Skin is pale throughout with dusky nailbeds and greying/cyanosis to the bottoms of feet. BLE +3 edema.  Breathing is unlabored with periods of apnea, O2 sat 96% on RA.   FC is in place with dark yellow urine in BSD bag.  She has no indicators of distress or discomfort at this time.     [facility] RN will continue to see daily to assess needs and collaborate with [facility] staff to provide care which achieves optimal patient comfort during the end of life.        Dena Giles RN  [facility]  Scattered Bed Team  [phone]

## 2022-06-04 NOTE — PROGRESS NOTES
Name: Christine Wharton ADMIT: 2022   : 1932  PCP: No primary care provider on file.    MRN: [id number] LOS: 12 days   AGE/SEX: 89 y.o. female  ROOM: [facility]     CC- AMS  Subjective   Non responsive    - 0.4mg IV robinul x 3  - 1mg IV dilaudid x 3  - 1.5mg IV dilaudid x 3  - 2mg IV ativan x 6    On 4P unit    Seen by hospice    No family here today during exam.     Objective   Vital Signs  Heart Rate:  [60-63] 60  Resp:  [8-12] 8  BP: (101)/(54) 101/54  SpO2:  [92 %-96 %] 96 %  on   ;   Device (Oxygen Therapy): room air  There is no height or weight on file to calculate BMI.  Sleeping, unresponsive  Actively dying  Objective:  General Appearance:  Not in pain.    Vital signs: (most recent): Blood pressure 101/54, pulse 60, temperature 95.5 °F (35.3 °C), temperature source Axillary, resp. rate 8, SpO2 96 %.  Vital signs are normal.    Output: Producing urine.    HEENT: Normal HEENT exam.    Lungs:  She is not in respiratory distress.  There are decreased breath sounds.    Skin:  Dry.          Results Review:       I reviewed the patient's new clinical results.      Estimated Creatinine Clearance: 24.1 mL/min (A) (by C-G formula based on SCr of 1.22 mg/dL (H)).     [id number] Basic Metabolic Panel [[id number]]    (Abnormal)   Blood    Final result Component Value Units   Glucose 78 mg/dL   BUN 35 High  mg/dL   Creatinine 1.22 High  mg/dL   Sodium 139 mmol/L   Potassium 4.1 mmol/L   Chloride 110 High  mmol/L   CO2 18.0 Low  mmol/L   Calcium 7.5 Low  mg/dL   BUN/Creatinine Ratio 28.7 High     Anion Gap 11.0 mmol/L   eGFR 42.2 Low   mL/min/1.73          2022 0455 2022 0533 CBC (No Diff) [[id number]]   (Abnormal)   Blood    Final result Component Value Units   WBC 9.11 10*3/mm3   RBC 3.31 Low  10*6/mm3   Hemoglobin 10.1 Low  g/dL   Hematocrit 32.0 Low  %   MCV 96.7 fL   MCH 30.5 pg   MCHC 31.6 g/dL   RDW 13.8 %   RDW-SD 49.3 fl   MPV 9.5 fL   Platelets 184 10*3/mm3                  NPO  Diet NPO Type: Ice Chips      Assessment & Plan   Assessment:      Active Hospital Problems    Diagnosis  POA   • **Alzheimer's dementia, late onset, with behavioral disturbance (HCC) [G30.1, F02.81]  Yes   • Hospice care patient [Z51.5]  Not Applicable   • Stage 3b chronic kidney disease (HCC) [N18.32]  Yes   • Closed left hip fracture, with routine healing, subsequent encounter [S72.002D]  Not Applicable   • Cerebrovascular small vessel disease [I67.9]  Yes   • Cerebral atrophy (HCC) [G31.9]  Yes   • Left ventricular diastolic dysfunction [I51.9]  Yes   • Adult hypothyroidism [E03.9]  Yes   • Benign essential HTN [I10]  Yes   • Coronary artery disease involving native coronary artery of native heart without angina pectoris [I25.10]  Yes      Resolved Hospital Problems   No resolved problems to display.       Mrs Wharton is a 89-year-old female with a known history of dementia chronic kidney disease hypertension hypothyroidism that presented from the nursing home after a fall with left hip fracture and urinary tract infection.  She was treated for urinary tract infection and ultimately underwent a left hemiarthroplasty but her postoperative course was pretty complicated with severe delirium and behavioral disturbances related to her dementia.  Attempts at medical management of these issues was unsuccessful.  Given her further clinical decline recommendations were made to consider palliative care and comfort measures.  After discussion with the family they elected to pursue this level of care.  Nonessential medications have been discontinued she remains on medications for comfort.  Based on her current palliative performance index likely hours to days    Plan:   - Continue comfort care  - IV narcotics and ativan for symptom control  - Gallardo, O2      - 0.4mg IV robinul x 3  - 1mg IV dilaudid x 3  - 1.5mg IV dilaudid x 3  - 2mg IV ativan x 6      Reviewed previous records          Horacio Lira MD  [facility]  06/04/22  15:48 EDT

## 2022-06-04 NOTE — PLAN OF CARE
Goal Outcome Evaluation:  Plan of Care Reviewed With: patient        Progress: declining  Outcome Evaluation: Appears comfortable at rest. Medicated with ativan, dilaudid and robinul q4 prior to turns. Will continue palliative measures

## 2022-06-05 NOTE — PROGRESS NOTES
Name: Christine Wharton ADMIT: 2022   : 1932  PCP: No primary care provider on file.    MRN: [id number] LOS: 13 days   AGE/SEX: 89 y.o. female  ROOM: [facility]     CC- AMS  Subjective   Non responsive    - 0.4mg IV robinul x 5  - 1mg IV dilaudid x 3  - 1.5mg IV dilaudid x 3  - 2mg IV ativan x 6    On 4P unit    Seen by hospice    No family here today during exam.   Seems comfortable but actively dying    Objective   Vital Signs  Heart Rate:  [62-63] 63  Resp:  [12] 12  BP: ()/(42-54) 112/54  SpO2:  [89 %-92 %] 89 %  on   ;   Device (Oxygen Therapy): room air  There is no height or weight on file to calculate BMI.  Sleeping, unresponsive  Actively dying  +catheter  Extremities a little dusky  Objective:  General Appearance:  Not in pain.    Vital signs: (most recent): Blood pressure 112/54, pulse 63, temperature 95.5 °F (35.3 °C), temperature source Axillary, resp. rate 12, SpO2 (!) 89 %.  Vital signs are normal.    Output: Producing urine.    HEENT: Normal HEENT exam.    Lungs:  She is not in respiratory distress.  There are decreased breath sounds.    Skin:  Dry.          Results Review:       I reviewed the patient's new clinical results.      Estimated Creatinine Clearance: 24.1 mL/min (A) (by C-G formula based on SCr of 1.22 mg/dL (H)).    2022 0641 Basic Metabolic Panel [[id number]]    (Abnormal)   Blood    Final result Component Value Units   Glucose 78 mg/dL   BUN 35 High  mg/dL   Creatinine 1.22 High  mg/dL   Sodium 139 mmol/L   Potassium 4.1 mmol/L   Chloride 110 High  mmol/L   CO2 18.0 Low  mmol/L   Calcium 7.5 Low  mg/dL   BUN/Creatinine Ratio 28.7 High     Anion Gap 11.0 mmol/L   eGFR 42.2 Low   mL/min/1.73          2022 0455 2022 0533 CBC (No Diff) [[id number]]   (Abnormal)   Blood    Final result Component Value Units   WBC 9.11 10*3/mm3   RBC 3.31 Low  10*6/mm3   Hemoglobin 10.1 Low  g/dL   Hematocrit 32.0 Low  %   MCV 96.7 fL   MCH 30.5 pg   MCHC 31.6 g/dL    RDW 13.8 %   RDW-SD 49.3 fl   MPV 9.5 fL   Platelets 184 10*3/mm3                  NPO Diet NPO Type: Ice Chips      Assessment & Plan   Assessment:      Active Hospital Problems    Diagnosis  POA   • **Alzheimer's dementia, late onset, with behavioral disturbance (HCC) [G30.1, F02.81]  Yes   • Hospice care patient [Z51.5]  Not Applicable   • Stage 3b chronic kidney disease (HCC) [N18.32]  Yes   • Closed left hip fracture, with routine healing, subsequent encounter [S72.002D]  Not Applicable   • Cerebrovascular small vessel disease [I67.9]  Yes   • Cerebral atrophy (HCC) [G31.9]  Yes   • Left ventricular diastolic dysfunction [I51.9]  Yes   • Adult hypothyroidism [E03.9]  Yes   • Benign essential HTN [I10]  Yes   • Coronary artery disease involving native coronary artery of native heart without angina pectoris [I25.10]  Yes      Resolved Hospital Problems   No resolved problems to display.       Mrs Wharton is a 89-year-old female with a known history of dementia chronic kidney disease hypertension hypothyroidism that presented from the nursing home after a fall with left hip fracture and urinary tract infection.  She was treated for urinary tract infection and ultimately underwent a left hemiarthroplasty but her postoperative course was pretty complicated with severe delirium and behavioral disturbances related to her dementia.  Attempts at medical management of these issues was unsuccessful.  Given her further clinical decline recommendations were made to consider palliative care and comfort measures.  After discussion with the family they elected to pursue this level of care.  Nonessential medications have been discontinued she remains on medications for comfort.  Based on her current palliative performance index likely hours to days    Plan:   - Continue comfort care  - IV narcotics and ativan for symptom control  - Gallardo, O2    She is actively dying, continue to offer support    Reviewed previous  records          Horacio Lira MD  [facility]  06/05/22  14:58 EDT

## 2022-06-05 NOTE — PROGRESS NOTES
hospitals Visit Report    Christine Wharton  [id number]  6/5/2022      [facility]     Reason for [facility] Admission: Senile degeneration of the brain     Symptom  Management: pain, restlessness, secretions     Nursing/Medication Recommendations: Continue to provide comfort care as ordered.      Psychosocial Issues and Recommendations: None at present     Spiritual Concerns and Recommendations: None at present     [facility] Discharge Plans: GIP appropriate for administration and titration of parenteral medications and continuous skilled nursing assessments and intervention.  If patient were to stabilize she will likely need LTC with hospitals to follow.      Review of Visit:  Vital Signs:  63 12 112/54     PPS:  10%     Medications in 24 hours:    - 0.4mg IV robinul x 5  - 1mg IV dilaudid x 3  - 1.5mg IV dilaudid x 3  - 2mg IV ativan x 6  - Scopolamine patch applied 6/04      Assessment:  Collaborated with staff HERMELINDA Contreras and reviewed Epic chart prior to visit. Patient is in bed with eyes closed, unresponsive to touch and voice.  Skin is pale throughout with dusky nailbeds and greying/cyanosis to the bottoms of feet. BLE +2 edema. Right hand is edematous.  Breathing is unlabored with periods of apnea, O2 sat 89% on RA.   FC is in place with dark yellow urine in BSD bag.  She has no indicators of distress or discomfort at this time.      [facility] RN will continue to see daily to assess needs and collaborate with [facility] staff to provide care which achieves optimal patient comfort during the end of life.         Dena Giles RN  [facility]  Scattered Bed Team  [phone]

## 2022-06-05 NOTE — PLAN OF CARE
Goal Outcome Evaluation:  Plan of Care Reviewed With: patient        Progress: declining  Outcome Evaluation: Appears comfortable at rest. Medicated with ativan, dilaudid, and robinul prior to turns. No family present tonight. Will continue palliative care

## 2022-06-05 NOTE — PLAN OF CARE
Goal Outcome Evaluation:  Plan of Care Reviewed With: patient        Progress: declining  Outcome Evaluation: PPS 10%, actively dying, unresponsive. Premedicated with dilaudid and ativan. Pt given robinul x1. Scopolamine patch changed today. Skin pale and cool to touch. Breathing apnic at times. Appears calm and comfortable.

## 2022-06-06 NOTE — PROGRESS NOTES
Name: Christine Wharton ADMIT: 2022   : 1932  PCP: No primary care provider on file.    MRN: [id number] LOS: 14 days   AGE/SEX: 89 y.o. female  ROOM: [facility]     CC- AMS  Subjective   Non responsive    Getting robinul, ativan, dilaudid    On 4P unit    Seen by hospice    No family here today during exam but they were here last night  Seems comfortable but actively dying    Objective   Vital Signs  Heart Rate:  [63-64] 63  Resp:  [8-10] 10  BP: (94)/(50) 94/50  SpO2:  [90 %-93 %] 90 %  on   ;   Device (Oxygen Therapy): room air  There is no height or weight on file to calculate BMI.  Sleeping, unresponsive  Actively dying  +catheter  Pale  Objective:  General Appearance:  Not in pain.    Vital signs: (most recent): Blood pressure 94/50, pulse 63, temperature 95.5 °F (35.3 °C), temperature source Axillary, resp. rate 10, SpO2 90 %.  Vital signs are normal.    Output: Producing urine.    HEENT: Normal HEENT exam.    Lungs:  She is not in respiratory distress.  There are decreased breath sounds.    Skin:  Dry.          Results Review:       I reviewed the patient's new clinical results.      Estimated Creatinine Clearance: 24.1 mL/min (A) (by C-G formula based on SCr of 1.22 mg/dL (H)).    2022 0641 Basic Metabolic Panel [[id number]]    (Abnormal)   Blood    Final result Component Value Units   Glucose 78 mg/dL   BUN 35 High  mg/dL   Creatinine 1.22 High  mg/dL   Sodium 139 mmol/L   Potassium 4.1 mmol/L   Chloride 110 High  mmol/L   CO2 18.0 Low  mmol/L   Calcium 7.5 Low  mg/dL   BUN/Creatinine Ratio 28.7 High     Anion Gap 11.0 mmol/L   eGFR 42.2 Low   mL/min/1.73          2022 0455 2022 0533 CBC (No Diff) [[id number]]   (Abnormal)   Blood    Final result Component Value Units   WBC 9.11 10*3/mm3   RBC 3.31 Low  10*6/mm3   Hemoglobin 10.1 Low  g/dL   Hematocrit 32.0 Low  %   MCV 96.7 fL   MCH 30.5 pg   MCHC 31.6 g/dL   RDW 13.8 %   RDW-SD 49.3 fl   MPV 9.5 fL   Platelets 184  10*3/mm3                  NPO Diet NPO Type: Ice Chips      Assessment & Plan   Assessment:      Active Hospital Problems    Diagnosis  POA   • **Alzheimer's dementia, late onset, with behavioral disturbance (HCC) [G30.1, F02.81]  Yes   • Hospice care patient [Z51.5]  Not Applicable   • Stage 3b chronic kidney disease (HCC) [N18.32]  Yes   • Closed left hip fracture, with routine healing, subsequent encounter [S72.002D]  Not Applicable   • Cerebrovascular small vessel disease [I67.9]  Yes   • Cerebral atrophy (HCC) [G31.9]  Yes   • Left ventricular diastolic dysfunction [I51.9]  Yes   • Adult hypothyroidism [E03.9]  Yes   • Benign essential HTN [I10]  Yes   • Coronary artery disease involving native coronary artery of native heart without angina pectoris [I25.10]  Yes      Resolved Hospital Problems   No resolved problems to display.       Mrs Wharton is a 89-year-old female with a known history of dementia chronic kidney disease hypertension hypothyroidism that presented from the nursing home after a fall with left hip fracture and urinary tract infection.  She was treated for urinary tract infection and ultimately underwent a left hemiarthroplasty but her postoperative course was pretty complicated with severe delirium and behavioral disturbances related to her dementia.  Attempts at medical management of these issues was unsuccessful.  Given her further clinical decline recommendations were made to consider palliative care and comfort measures.  After discussion with the family they elected to pursue this level of care.  Nonessential medications have been discontinued she remains on medications for comfort.  Based on her current palliative performance index likely hours to days    Plan:   - Continue comfort care  - IV narcotics and ativan for symptom control  - Gallardo, O2    She is actively dying, continue to offer support    Reviewed previous records  MANUEL RN at bedside        Horacio Lira MD  [facility]  06/06/22  12:43 EDT

## 2022-06-06 NOTE — PROGRESS NOTES
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     Symptom  Management: Pain, restlessness and congestion    Nursing/Medication Recommendations: Contact [facility] with any questions, concerns and at time of death at [phone].      [facility] Discharge Plans: None at this time. If patient's condition was to stabilize patient may need LTC placement for on-going daily care needs or transfer to the [facility] if patient is requiring further IV symptom management.     Collaboration: Reviewed chart in Epic and received update from facility RN Neel who reports patient is unresponsive, medicating prior to turns/care using IV Dilaudid, Ativan and Robinul to maintain as she is declining and resting comfortably.       Review of Visit: PPS 10% bed bound with oral care only. Upon entering room, PCA and IV RN at bedside, patient lost IV access and obtaining new IV to left upper arm. Patient is lying in bed, completley unresponsive to tactile stimulation with open mouth breathing, no congestion and appears to be resting comfortably with no family at bedside. Patient remains pale in color with waxy appearance, increasing paleness to fingernails, mottling to palms of hands and soles of feet with on-going edema to legs. Respiration are very shallow, unlabored and diminished throughout and room air - 84%. Gallardo remains in place, secure with small dark urine and appears comfortable on current medication regimen.     Medications given in the last 24hrs:  Robinul 0.4mg IV Q2hrs prn x 8  Dilaudid 1.5mg IV Q1hr prn x 8  Ativan 2mg IV Q1hr prn x 8  Scopolamine patch TD x 1     Updated patient's  Cassie by phone, reviewed overall patient assessment,  medications and addressed all questions with no voiced concerns and aware patient continues to gradually decline and in the dying phase.      Patient appears to be gradually declining, is requiring IV symptom management prior to turns/care in order to maintain comfort while in the dying phase and is meeting GIP criteria. [facility] will continue to monitor closely with daily RN visits, offer support and continue EOL education with family and staff.       Eulalia Dang RN   Scatter Bed Team

## 2022-06-06 NOTE — PLAN OF CARE
Problem: Adult Inpatient Plan of Care  Goal: Plan of Care Review  Outcome: Ongoing, Progressing  Flowsheets (Taken 6/6/2022 1804)  Progress: no change  Plan of Care Reviewed With: patient  Outcome Evaluation: No acute changes noted during shift. Patient was medicated prior to turns for symptom management. She appears comfortable at rest. Will continue to monitor per palliative goals of care.

## 2022-06-06 NOTE — PLAN OF CARE
Goal Outcome Evaluation:  Plan of Care Reviewed With: family        Progress: declining  Outcome Evaluation: PPS 10%, actively dying. Pt unresponsive. Breathing shallow, irregular, periods of apnea this evening increasing in length. Pt's son and  at bedside for a little while this afternoon. Family made of aware of declining status. Pt premedicated with dilaudid, ativan and robinul. Pt appears calm and comfortable.

## 2022-06-06 NOTE — PLAN OF CARE
Goal Outcome Evaluation:  Plan of Care Reviewed With: family        Progress: declining  Outcome Evaluation: Pt unresponsive. Family at bedside at beginning of the night, aware of pt continued slow decline. Premedicated prior to turns with ativan 2mg, dialudid 1.5mg, and robinul 0.4mg with good effect. Pt has appeared comfortable between care. Will continue to monitor for comfort.

## 2022-06-07 NOTE — PROGRESS NOTES
[facility] Visit Report    Christine Wharton  [id number]  6/7/2022    Admission R/T [facility] Dx: Yes    Reason for [facility] Admission: senile degeneration of the brain    Symptom Management: Pain, restlessness and congestion    Review of Visit: Patient is a 89yo female with a primary [facility] diagnosis of senile degeneration of the brain. Admitted to [facility] for G[facility] for EOL care and symptom management of pain, restlessness and congestion.     PPS: 10%, bed bound, oral care only, minimally responsive to pain, actively dying.     VS: 95.4, 60, 10, UTO BP, 90% on room air.     Medications in 24 hours:  -IV Robinul 0.4mg x6  -IV Dilaudid 1.5mg x6  -IV Ativan 2mg x6  -Scopolamine patch    Recommendations:  Continue to monitor for signs of decline and provide comfort measures. Contact [facility] at [phone] with any questions or concerns and at TOD.     Assessment:  Patient is bed bound, oral care only, minimally responsive to pain, actively dying, PPS 10%. Respirations are even and unlabored with open mouth breathing, terminal congestion and neck extension. Abdomen is soft with absent bowel sounds. Color is pale to ashen, extremities are cool to touch with mottling and +3 edema to the hands and feet. Per documentation in epic, cristel ulcer present to the bilateral gluteal, dressing in place, YVROSE. Left hip incision with staples, SHANNON. F/C to BSD with scant laci UOP.     Collaboration:  Spoke with pts spouse via phone with condition update and support provided. Training provided regarding assessment findings, disease progression, symptom management and ELOS. Family v/u of pts condition and all questions were answered. Collaborated with [facility] RN and CCP about pts condition.     Disposition:  Patient meets GIP criteria d/t frequent administration and continued titration of IV medications to achieve and maintain symptom management. Patient appears to be unsafe for transport at this time, requiring increasing symptom management and  frequent RN assessment. If patient were to stabilize, she would likely require LTC placement d/t increased daily care needs. Will continue [facility] RN visits to monitor for changes, assess needs and provide support.       Hetal Haley RN   [facility] Health Visit Nurse  Scattered Bed Team

## 2022-06-07 NOTE — PROGRESS NOTES
Name: Christine Wharton ADMIT: 2022   : 1932  PCP: No primary care provider on file.    MRN: [id number] LOS: 15 days   AGE/SEX: 89 y.o. female  ROOM: [facility]     CC- AMS  Subjective   Non responsive    Getting robinul, ativan, dilaudid    Getting turns by staff    Seen by hospice    Dying    No family in room at time of exam    Objective   Vital Signs  Temp:  [96.2 °F (35.7 °C)] 96.2 °F (35.7 °C)  Heart Rate:  [58-60] 58  Resp:  [10-12] 12  SpO2:  [90 %] 90 %  on   ;   Device (Oxygen Therapy): room air  There is no height or weight on file to calculate BMI.  Sleeping, unresponsive  Actively dying  +catheter  Pale  Lungs fairly clear  Minimal upper airway congestion   Objective:  General Appearance:  Not in pain.    Vital signs: (most recent): Blood pressure (!) 74/52, pulse 58, temperature 96.2 °F (35.7 °C), temperature source Axillary, resp. rate 12, SpO2 90 %.  Vital signs are normal.    Output: Producing urine.    HEENT: Normal HEENT exam.    Lungs:  She is not in respiratory distress.  There are decreased breath sounds.    Skin:  Dry.          Results Review:       I reviewed the patient's new clinical results.      Estimated Creatinine Clearance: 24.1 mL/min (A) (by C-G formula based on SCr of 1.22 mg/dL (H)).     [id number] 0641 Basic Metabolic Panel [[id number]]    (Abnormal)   Blood    Final result Component Value Units   Glucose 78 mg/dL   BUN 35 High  mg/dL   Creatinine 1.22 High  mg/dL   Sodium 139 mmol/L   Potassium 4.1 mmol/L   Chloride 110 High  mmol/L   CO2 18.0 Low  mmol/L   Calcium 7.5 Low  mg/dL   BUN/Creatinine Ratio 28.7 High     Anion Gap 11.0 mmol/L   eGFR 42.2 Low   mL/min/1.73          2022 0455 2022 0533 CBC (No Diff) [[id number]]   (Abnormal)   Blood    Final result Component Value Units   WBC 9.11 10*3/mm3   RBC 3.31 Low  10*6/mm3   Hemoglobin 10.1 Low  g/dL   Hematocrit 32.0 Low  %   MCV 96.7 fL   MCH 30.5 pg   MCHC 31.6 g/dL   RDW 13.8 %   RDW-SD 49.3 fl    MPV 9.5 fL   Platelets 184 10*3/mm3                  NPO Diet NPO Type: Ice Chips      Assessment & Plan   Assessment:      Active Hospital Problems    Diagnosis  POA   • **Alzheimer's dementia, late onset, with behavioral disturbance (HCC) [G30.1, F02.81]  Yes   • Hospice care patient [Z51.5]  Not Applicable   • Stage 3b chronic kidney disease (HCC) [N18.32]  Yes   • Closed left hip fracture, with routine healing, subsequent encounter [S72.002D]  Not Applicable   • Cerebrovascular small vessel disease [I67.9]  Yes   • Cerebral atrophy (HCC) [G31.9]  Yes   • Left ventricular diastolic dysfunction [I51.9]  Yes   • Adult hypothyroidism [E03.9]  Yes   • Benign essential HTN [I10]  Yes   • Coronary artery disease involving native coronary artery of native heart without angina pectoris [I25.10]  Yes      Resolved Hospital Problems   No resolved problems to display.       Mrs Wharton is a 89-year-old female with a known history of dementia chronic kidney disease hypertension hypothyroidism that presented from the nursing home after a fall with left hip fracture and urinary tract infection.  She was treated for urinary tract infection and ultimately underwent a left hemiarthroplasty but her postoperative course was pretty complicated with severe delirium and behavioral disturbances related to her dementia.  Attempts at medical management of these issues was unsuccessful.  Given her further clinical decline recommendations were made to consider palliative care and comfort measures.  After discussion with the family they elected to pursue this level of care.  Nonessential medications have been discontinued she remains on medications for comfort.  Based on her current palliative performance index likely hours to days    Plan:   - Continue comfort care  - IV narcotics and ativan for symptom control  - Paulina, CHERYL    She is actively dying, continue to offer support. Likely just hours to days remaining     Reviewed previous  records   staff        Horacio Lira MD  [facility]  06/07/22  13:56 EDT

## 2022-06-07 NOTE — PLAN OF CARE
Palliative care patient PPS 10% resting comfortably in bed. Unresponsive, premedicated for turns, Small amount of thick secretions coming out of pt's mouth, oral care provided PRN. Gallardo catheter draining dark yellow urine. Pt does not appear to be in any distress.

## 2022-06-08 NOTE — PLAN OF CARE
Goal Outcome Evaluation:  Plan of Care Reviewed With: patient        Progress: declining  Outcome Evaluation: Medicated q4 hours prior to turns, tolerated well. Appears comfortable at rest. Will continue palliative care

## 2022-06-08 NOTE — PROGRESS NOTES
[facility] Visit Report    Christine Wharton  [id number]  6/8/2022    Admission R/T [facility] Dx: Yes     Reason for [facility] Admission: senile degeneration of the brain     Symptom Management: Pain, restlessness and congestion     Review of Visit: Patient is a 89yo female with a primary [facility] diagnosis of senile degeneration of the brain. Admitted to [facility] for [facility] for EOL care and symptom management of pain, restlessness and congestion.      PPS: 10%, bed bound, oral care only, minimally responsive to pain, actively dying.      VS: 95.8, 55, 10, 70/30, UTO O2 saturation     Medications in 24 hours:  -IV Robinul 0.4mg x6  -IV Dilaudid 1.5mg x6  -IV Ativan 2mg x6  -Scopolamine patch     Recommendations:  Continue to monitor for signs of decline and provide comfort measures. Contact [facility] at [phone] with any questions or concerns and at TOD.      Assessment:  Patient is bed bound, oral care only, minimally responsive to pain, actively dying, PPS 10%. Respirations are even and unlabored with open mouth breathing, terminal congestion and neck extension. Abdomen is soft with hypoactive bowel sounds. Color is pale to ashen, extremities are cool to touch with mottling and +3 edema to the hands and feet. Per documentation in epic, cristel ulcer present to the bilateral gluteal, dressing in place, YVROSE. Left hip incision with staples, SHANNON. F/C to BSD with diminished dark yellow UOP, 150mL documented in 24 hours.      Collaboration:  Spoke with pts spouse via phone with condition update and support provided. Training provided regarding assessment findings, disease progression, symptom management and ELOS. Family v/u of pts condition and all questions were answered. Spouse reports that he is struggling with events leading to pts fall and hospitalization as well as her decline. Reports that he understands nothing can be done to improve her condition but feels that he should have been able to prevent her injuries. Priscilla SHEN  provided active listening and emotional support throughout conversation. Collaborated with [facility] RN and CCP about pts condition.      Disposition:  Patient meets GIP criteria d/t frequent administration and continued titration of IV medications to achieve and maintain symptom management. Patient appears to be unsafe for transport at this time, requiring increasing symptom management and frequent RN assessment. If patient were to stabilize, she would likely require LTC placement d/t increased daily care needs. Will continue [facility] RN visits to monitor for changes, assess needs and provide support.      Hetal Haley RN   [facility] Health Visit Nurse  Scattered Bed Team

## 2022-06-08 NOTE — PLAN OF CARE
Palliative care patient PPS 10% unresponsive. Premedicated for turns with 1.5mg dilaudid, 2mg ativan, and 0.4mg robinul. Symptoms of congestion improved compared to yesterday. Gallardo catheter in place draining dark laci urine. Pt resting in bed with eyes closed in no distress. Patient's  and son now at bedside this evening.

## 2022-06-08 NOTE — PROGRESS NOTES
Name: Christine Wharton ADMIT: 2022   : 1932  PCP: No primary care provider on file.    MRN: [id number] LOS: 16 days   AGE/SEX: 89 y.o. female  ROOM: [facility]     CC- AMS  Subjective   Non responsive    Getting robinul, ativan, dilaudid    Getting turns by staff    Dying    No family in room at time of exam     Objective   Vital Signs  Temp:  [95.8 °F (35.4 °C)] 95.8 °F (35.4 °C)  Heart Rate:  [55] 55  Resp:  [10] 10  BP: (70)/(30) 70/30     on   ;   Device (Oxygen Therapy): room air  There is no height or weight on file to calculate BMI.  Sleeping, unresponsive  Actively dying  +catheter with urine  Pale  Lungs fairly clear  Minimal upper airway congestion   Objective:  General Appearance:  Not in pain.    Vital signs: (most recent): Blood pressure (!) 70/30, pulse 55, temperature 95.8 °F (35.4 °C), temperature source Axillary, resp. rate 10, SpO2 90 %.  Vital signs are normal.    Output: Producing urine.    HEENT: Normal HEENT exam.    Lungs:  She is not in respiratory distress.  There are decreased breath sounds.    Skin:  Dry.          Results Review:       I reviewed the patient's new clinical results.      Estimated Creatinine Clearance: 24.1 mL/min (A) (by C-G formula based on SCr of 1.22 mg/dL (H)).    2022 0641 Basic Metabolic Panel [[id number]]    (Abnormal)   Blood    Final result Component Value Units   Glucose 78 mg/dL   BUN 35 High  mg/dL   Creatinine 1.22 High  mg/dL   Sodium 139 mmol/L   Potassium 4.1 mmol/L   Chloride 110 High  mmol/L   CO2 18.0 Low  mmol/L   Calcium 7.5 Low  mg/dL   BUN/Creatinine Ratio 28.7 High     Anion Gap 11.0 mmol/L   eGFR 42.2 Low   mL/min/1.73          2022 0455 2022 0533 CBC (No Diff) [[id number]]   (Abnormal)   Blood    Final result Component Value Units   WBC 9.11 10*3/mm3   RBC 3.31 Low  10*6/mm3   Hemoglobin 10.1 Low  g/dL   Hematocrit 32.0 Low  %   MCV 96.7 fL   MCH 30.5 pg   MCHC 31.6 g/dL   RDW 13.8 %   RDW-SD 49.3 fl   MPV  9.5 fL   Platelets 184 10*3/mm3                  NPO Diet NPO Type: Ice Chips      Assessment & Plan   Assessment:      Active Hospital Problems    Diagnosis  POA   • **Alzheimer's dementia, late onset, with behavioral disturbance (HCC) [G30.1, F02.81]  Yes   • Hospice care patient [Z51.5]  Not Applicable   • Stage 3b chronic kidney disease (HCC) [N18.32]  Yes   • Closed left hip fracture, with routine healing, subsequent encounter [S72.002D]  Not Applicable   • Cerebrovascular small vessel disease [I67.9]  Yes   • Cerebral atrophy (HCC) [G31.9]  Yes   • Left ventricular diastolic dysfunction [I51.9]  Yes   • Adult hypothyroidism [E03.9]  Yes   • Benign essential HTN [I10]  Yes   • Coronary artery disease involving native coronary artery of native heart without angina pectoris [I25.10]  Yes      Resolved Hospital Problems   No resolved problems to display.       Mrs Wharton is a 89-year-old female with a known history of dementia chronic kidney disease hypertension hypothyroidism that presented from the nursing home after a fall with left hip fracture and urinary tract infection.  She was treated for urinary tract infection and ultimately underwent a left hemiarthroplasty but her postoperative course was pretty complicated with severe delirium and behavioral disturbances related to her dementia.  Attempts at medical management of these issues was unsuccessful.  Given her further clinical decline recommendations were made to consider palliative care and comfort measures.  After discussion with the family they elected to pursue this level of care.  Nonessential medications have been discontinued she remains on medications for comfort.  Based on her current palliative performance index likely hours to days    Plan:   - Continue comfort care  - IV narcotics and ativan for symptom control  - Gallardo, O2    She is actively dying, continue to offer support. Likely just hours remaining and expect she may pass away  today    Reviewed previous records  DW staff        Horacoi Lira MD  [facility]  06/08/22  14:20 EDT

## 2022-06-09 NOTE — PROGRESS NOTES
[facility] Visit Report    Christine Wharton  [id number]  6/9/2022    Admission R/T [facility] Dx: Yes     Reason for [facility] Admission: senile degeneration of the brain     Symptom Management: Pain, restlessness and congestion     Review of Visit: Patient is a 89yo female with a primary [facility] diagnosis of senile degeneration of the brain. Admitted to [facility] for [facility] for EOL care and symptom management of pain, restlessness and congestion.      PPS: 10%, bed bound, oral care only, minimally responsive to pain, actively dying.      VS: 88, 54, 14, UTO BP, 91% on room air.      Medications in 24 hours:  -IV Robinul 0.4mg x6  -IV Dilaudid 1.5mg x6  -IV Ativan 2mg x6  -Scopolamine patch     Recommendations:  Continue to monitor for signs of decline and provide comfort measures. Contact [facility] at [phone] with any questions or concerns and at TOD.      Assessment:  Patient is bed bound, oral care only, minimally responsive to pain, actively dying, PPS 10%. Respirations are shallow and unlabored with open mouth breathing, slight audible congestion and neck extension. Abdomen is soft with hypoactive bowel sounds. Color is ashen, extremities are cool to touch with mottling and +3 edema to the hands and feet. Per documentation in epic, cristel ulcer present to the bilateral gluteal, dressing in place, YVROSE. Left hip incision with staples, SHANNON. F/C to BSD with diminished dark yellow UOP, 125mL documented in 24 hours.      Collaboration:  Spoke with pts spouse and son via phone with condition update and support provided. Training provided regarding assessment findings, disease progression, symptom management and ELOS. Family v/u of pts condition and all questions were answered. Spouse reports that he is struggling with events leading to pts fall and hospitalization as well as her decline. Reports that he understands nothing can be done to improve her condition but feels that he should have been able to prevent her injuries.  [facility] provided active listening and emotional support throughout conversation. Collaborated with [facility] RN and CCP about pts condition.      Disposition:  Patient meets GIP criteria d/t frequent administration and continued titration of IV medications to achieve and maintain symptom management. Patient appears to be unsafe for transport at this time, requiring increasing symptom management and frequent RN assessment. If patient were to stabilize, she would likely require LTC placement d/t increased daily care needs. Will continue [facility] RN visits to monitor for changes, assess needs and provide support.       Hetal Haley, RN   [facility] Health Visit Nurse  Scattered Bed Team

## 2022-06-09 NOTE — PROGRESS NOTES
Name: Christine Wharton ADMIT: 2022   : 1932  PCP: No primary care provider on file.    MRN: [id number] LOS: 17 days   AGE/SEX: 89 y.o. female  ROOM: [facility]     CC- AMS  Subjective   Non responsive    Getting robinul, ativan, dilaudid    Getting turns by staff    Dying    No family in room at time of exam     Objective   Vital Signs  Temp:  [88 °F (31.1 °C)] 88 °F (31.1 °C)  Heart Rate:  [54] 54  Resp:  [12-15] 15  BP: (92)/(40) 92/40  SpO2:  [91 %] 91 %  on   ;   Device (Oxygen Therapy): room air  There is no height or weight on file to calculate BMI.  unresponsive  Actively dying  +catheter with urine  Pale  Lungs fairly clear  Minimal upper airway congestion   Objective:  General Appearance:  Not in pain.    Vital signs: (most recent): Blood pressure 92/40, pulse 54, temperature (!) 88 °F (31.1 °C), temperature source Oral, resp. rate 15, SpO2 91 %.  Vital signs are normal.    Output: Producing urine.    HEENT: Normal HEENT exam.    Lungs:  She is not in respiratory distress.  There are decreased breath sounds.    Skin:  Dry.          Results Review:       I reviewed the patient's new clinical results.      Estimated Creatinine Clearance: 24.1 mL/min (A) (by C-G formula based on SCr of 1.22 mg/dL (H)).    2022 0641 Basic Metabolic Panel [[id number]]    (Abnormal)   Blood    Final result Component Value Units   Glucose 78 mg/dL   BUN 35 High  mg/dL   Creatinine 1.22 High  mg/dL   Sodium 139 mmol/L   Potassium 4.1 mmol/L   Chloride 110 High  mmol/L   CO2 18.0 Low  mmol/L   Calcium 7.5 Low  mg/dL   BUN/Creatinine Ratio 28.7 High     Anion Gap 11.0 mmol/L   eGFR 42.2 Low   mL/min/1.73          2022 0455 2022 0533 CBC (No Diff) [[id number]]   (Abnormal)   Blood    Final result Component Value Units   WBC 9.11 10*3/mm3   RBC 3.31 Low  10*6/mm3   Hemoglobin 10.1 Low  g/dL   Hematocrit 32.0 Low  %   MCV 96.7 fL   MCH 30.5 pg   MCHC 31.6 g/dL   RDW 13.8 %   RDW-SD 49.3 fl   MPV  9.5 fL   Platelets 184 10*3/mm3                  NPO Diet NPO Type: Ice Chips      Assessment & Plan   Assessment:      Active Hospital Problems    Diagnosis  POA   • **Alzheimer's dementia, late onset, with behavioral disturbance (HCC) [G30.1, F02.81]  Yes   • Hospice care patient [Z51.5]  Not Applicable   • Stage 3b chronic kidney disease (HCC) [N18.32]  Yes   • Closed left hip fracture, with routine healing, subsequent encounter [S72.002D]  Not Applicable   • Cerebrovascular small vessel disease [I67.9]  Yes   • Cerebral atrophy (HCC) [G31.9]  Yes   • Left ventricular diastolic dysfunction [I51.9]  Yes   • Adult hypothyroidism [E03.9]  Yes   • Benign essential HTN [I10]  Yes   • Coronary artery disease involving native coronary artery of native heart without angina pectoris [I25.10]  Yes      Resolved Hospital Problems   No resolved problems to display.       Mrs Wharton is a 89-year-old female with a known history of dementia chronic kidney disease hypertension hypothyroidism that presented from the nursing home after a fall with left hip fracture and urinary tract infection.  She was treated for urinary tract infection and ultimately underwent a left hemiarthroplasty but her postoperative course was pretty complicated with severe delirium and behavioral disturbances related to her dementia.  Attempts at medical management of these issues was unsuccessful.  Given her further clinical decline recommendations were made to consider palliative care and comfort measures.  After discussion with the family they elected to pursue this level of care.  Nonessential medications have been discontinued she remains on medications for comfort.  Based on her current palliative performance index likely hours to days    Plan:   - Continue comfort care  - IV narcotics and ativan for symptom control  - Gallardo, O2    She is actively dying, continue to offer support. Likely just hours remaining and expect she may pass away  today    Reviewed previous records  DW RN in the room        Horacio Lira MD  [facility]  06/09/22  17:21 EDT

## 2022-06-09 NOTE — PLAN OF CARE
Problem: Adult Inpatient Plan of Care  Goal: Plan of Care Review  Outcome: Ongoing, Progressing  Flowsheets (Taken 6/9/2022 1801)  Progress: no change  Plan of Care Reviewed With: patient  Outcome Evaluation: Patient appears comfortable at rest. No family is at bedside. She was medicated prior to turns for symptom management. Will continue to monitor per palliative goals of care.

## 2022-06-10 NOTE — PROGRESS NOTES
Name: Christine Wharton ADMIT: 2022   : 1932  PCP: No primary care provider on file.    MRN: [id number] LOS: 18 days   AGE/SEX: 89 y.o. female  ROOM: [facility]     CC- AMS  Subjective   Non responsive    Getting robinul, ativan, dilaudid    Getting turns by staff    Dying    No family in room at time of exam 6/10    RN in the room during exam    Low temp today    Objective   Vital Signs  Temp:  [90.9 °F (32.7 °C)] 90.9 °F (32.7 °C)  Heart Rate:  [50-90] 90  Resp:  [16-20] 16  BP: (99)/(58) 99/58  SpO2:  [91 %] 91 %  on   ;   Device (Oxygen Therapy): room air  There is no height or weight on file to calculate BMI.  unresponsive  Actively dying  +catheter with urine  Pale  Lungs fairly clear  Minimal upper airway congestion   Cool/low BP  Objective:  General Appearance:  Not in pain.    Vital signs: (most recent): Blood pressure 99/58, pulse 90, temperature (!) 90.9 °F (32.7 °C), temperature source Axillary, resp. rate 16, SpO2 91 %.  Vital signs are normal.    Output: Producing urine.    HEENT: Normal HEENT exam.    Lungs:  She is not in respiratory distress.  There are decreased breath sounds.    Skin:  Dry.          Results Review:       I reviewed the patient's new clinical results.      Estimated Creatinine Clearance: 24.1 mL/min (A) (by C-G formula based on SCr of 1.22 mg/dL (H)).    2022 0641 Basic Metabolic Panel [[id number]]    (Abnormal)   Blood    Final result Component Value Units   Glucose 78 mg/dL   BUN 35 High  mg/dL   Creatinine 1.22 High  mg/dL   Sodium 139 mmol/L   Potassium 4.1 mmol/L   Chloride 110 High  mmol/L   CO2 18.0 Low  mmol/L   Calcium 7.5 Low  mg/dL   BUN/Creatinine Ratio 28.7 High     Anion Gap 11.0 mmol/L   eGFR 42.2 Low   mL/min/1.73          2022 0455 2022 0533 CBC (No Diff) [[id number]]   (Abnormal)   Blood    Final result Component Value Units   WBC 9.11 10*3/mm3   RBC 3.31 Low  10*6/mm3   Hemoglobin 10.1 Low  g/dL   Hematocrit 32.0 Low  %   MCV  96.7 fL   MCH 30.5 pg   MCHC 31.6 g/dL   RDW 13.8 %   RDW-SD 49.3 fl   MPV 9.5 fL   Platelets 184 10*3/mm3                  NPO Diet NPO Type: Ice Chips      Assessment & Plan   Assessment:      Active Hospital Problems    Diagnosis  POA   • **Alzheimer's dementia, late onset, with behavioral disturbance (HCC) [G30.1, F02.81]  Yes   • Hospice care patient [Z51.5]  Not Applicable   • Stage 3b chronic kidney disease (HCC) [N18.32]  Yes   • Closed left hip fracture, with routine healing, subsequent encounter [S72.002D]  Not Applicable   • Cerebrovascular small vessel disease [I67.9]  Yes   • Cerebral atrophy (HCC) [G31.9]  Yes   • Left ventricular diastolic dysfunction [I51.9]  Yes   • Adult hypothyroidism [E03.9]  Yes   • Benign essential HTN [I10]  Yes   • Coronary artery disease involving native coronary artery of native heart without angina pectoris [I25.10]  Yes      Resolved Hospital Problems   No resolved problems to display.       Mrs Wharton is a 89-year-old female with a known history of dementia chronic kidney disease hypertension hypothyroidism that presented from the nursing home after a fall with left hip fracture and urinary tract infection.  She was treated for urinary tract infection and ultimately underwent a left hemiarthroplasty but her postoperative course was pretty complicated with severe delirium and behavioral disturbances related to her dementia.  Attempts at medical management of these issues was unsuccessful.  Given her further clinical decline recommendations were made to consider palliative care and comfort measures.  After discussion with the family they elected to pursue this level of care.  Nonessential medications have been discontinued she remains on medications for comfort.  Based on her current palliative performance index likely hours to days    Plan:   - Continue comfort care  - IV narcotics and ativan for symptom control  - Gallardo, O2    She is actively dying, continue to offer  support. Likely just hours remaining and expect she may pass away today especially now with low temperature.     Reviewed previous records  DW RN in the room        Horacio Lira MD  [facility]  06/10/22  11:56 EDT

## 2022-06-10 NOTE — PLAN OF CARE
Problem: Adult Inpatient Plan of Care  Goal: Plan of Care Review  Outcome: Ongoing, Progressing  Flowsheets (Taken 6/10/2022 1813)  Progress: no change  Plan of Care Reviewed With: patient  Outcome Evaluation: Patient appears comfortable at rest. Patient's family visited briefly this evening. She was medicated prior to turns for symptom management. Will continue to monitor.

## 2022-06-10 NOTE — PROGRESS NOTES
hospitals Visit Report    Christine Wharton  [id number]  6/10/2022    [facility] Dx: Yes     Reason [facility] Admission: Patient has a hospice diagnosis of senile degeneration of the brain, transitioned to comfort care only on 5/23/22.     Symptom  Management: Pain, restlessness and congestion    Nursing/Medication Recommendations: Contact [facility] with any questions, concerns and at time of death at [phone].     [facility] Discharge Plans: None at this time. If patient's condition was to stabilize patient may need LTC placement for on-going daily care needs or transfer to the [facility] if patient is requiring further IV symptom management.      Collaboration: Reviewed chart in Epic and received update from facility RN Neel who reports patient is unresponsive, medicating prior to turns/care using IV Dilaudid, Ativan and Robinul to maintain comfort as she is declining and resting comfortably.       Review of Visit: PPS 10% bed bound with oral care only. Upon entering room, patient is alone, lying in bed with open mouth breathing, pale in color with waxy appearance and appears to be resting comfortably. Patient with on-going edema to upper and lower extremities, paleness to fingernails, mottling to palms of hands and soles of feet. Lung sounds are diminished with no congestion and on room air - 91%. Decrease urine output noted to banks bag.     Medications given in the last 24hrs:  Robinul 0.4mg IV Q2hrs prn x 7  Dilaudid 1.5mg IV Q1hr prn x 7  Ativan 2mg IV Q1hr prn x 7  Scopolamine patch TD x 1     Updated patient's  Cassie by phone, reviewed overall patient assessment, medications and addressed all questions with no voiced concerns and aware patient continues to gradually decline and in the dying phase.       Patient appears to be gradually declining, is requiring IV symptom management prior to turns/care in order to  maintain comfort while in the dying phase and is meeting GIP criteria. [facility] will continue to monitor closely with daily RN visits, offer support and continue EOL education with family and staff.       Eulalia Dang RN  Scatter Bed Team

## 2022-06-10 NOTE — PLAN OF CARE
Goal Outcome Evaluation:  Plan of Care Reviewed With: patient        Progress: declining  Outcome Evaluation: Medicated q4 hours prior to turns. Appears comfortable. Will continue palliative measures

## 2022-06-11 NOTE — PROGRESS NOTES
[facility]    Christine Wharton  [id number]  6/11/2022    [facility]: Yes     Reason for [facility] Admission: Patient has a hospice diagnosis of senile degeneration of the brain, transitioned to comfort care only on 5/23/22.      Symptom  Management: Pain, restlessness and congestion    Nursing/Medication Recommendations: Contact [facility] with any questions, concerns and at time of death at [phone].      [facility]: None at this time. If patient's condition was to stabilize patient may need LTC placement for on-going daily care needs or transfer to the [facility] if patient is requiring further IV symptom management.      Collaboration: Reviewed chart in Epic and received update from facility HERMELINDA Brown who reports patient is unresponsive, medicating prior to turns/care using IV Dilaudid, Ativan and Robinul to maintain comfort as she is declining and resting comfortably.       Review of Visit: PPS 10% bed bound with oral care only. No family present, entered room, patient is resting on her left side, completely unresponsive, appears to be resting comfortably, pale in color, waxy appearance, temporal wasting with shallow breathing and no congestion. Paleness increasing to fingernails, edema to upper and lower extremities with coolness to feet and mottling. Respirations are even, unlabored with diminished lung sounds, no congestion and remains on room air - 98%.     Medications given in the last 24hrs:  Robinul 0.4mg IV Q2hrs prn x 7  Dilaudid 1.5mg IV Q1hr prn x 7  Ativan 2mg IV Q1hr prn x 7  Scopolamine patch TD x 1    Attempted to update patient's  Cassie by phone with no answer and brief voice message.     Updated HERMELINDA Brown. Patient continues to decline slowly, in the dying phase and is meeting GIP criteria since requiring IV prn medications prior to turns/care to maintain comfort while declining.   [facility] will continue to monitor closely with daily RN visits, offer support and continue EOL education with family and staff.        Eulalia Dang, HERMELINDA   Scatter Bed Team

## 2022-06-11 NOTE — PLAN OF CARE
Goal Outcome Evaluation:  Plan of Care Reviewed With: patient        Progress: declining  Outcome Evaluation: Medicated q4 hours prior to turns. No signs of distress or labored breathing. No family present tonight. Will continue palliative care

## 2022-06-11 NOTE — PROGRESS NOTES
Palliative Care/Hospice Follow Up Note       LOS: 19 days   Patient Care Team:  Salvador Polk MD as Attending Provider (Hospice and Palliative Medicine)    Chief Complaint:  Alzheimer's disease/dementia    Interval History:     Patient Complaints: None  Patient Denies:  None  History taken from:  RN  Review of Systems:  As above.    Palliative Performance Scale  Palliative Performance Scale Score: 10%  [place] Symptom Assessment System Revised  Pain Score: 1   ESAS Tiredness Score: Worst possible tiredness  ESAS Nausea Score: No nausea  ESAS Depression Score: unable to assess  ESAS Anxiety Score: No anxiety  ESAS Drowsiness Score: Worst possible drowsiness  ESAS Lack of Appetite Score: Worst lack of appetite  ESAS Wellbeing Score: unable to assess  ESAS Dyspnea Score: unable to assess  ESAS Other Problem Score: unable to assess  ESAS Source of Information: healthcare professional caregiver  ESAS Intervention: medicated/see MAR  ESAS Intervention Response: tolerated    Vital Signs  Temp:  [86.3 °F (30.2 °C)-90.5 °F (32.5 °C)] (P) 86.3 °F (30.2 °C)  Heart Rate:  [40-45] (P) 40  Resp:  [14-16] (P) 14  BP: (90)/(53) 90/53  Device (Oxygen Therapy): (P) room air SpO2:  [92 %-98 %] 98 %    Physical Exam:  General Appearance:    Not awake and in no acute distress lying slightly on her right side, chronically ill-appearing elderly female   Throat:   No oral lesions, oral mucosa dry with mouth open   Neck:   No adenopathy, supple, trachea midline   Lungs:     Clear to auscultation, respirations diminished with slight increase inspiratory effort at times     Heart:    Regular rhythm and normal rate   Abdomen:     Occasional bowel sounds, soft and non-tender, non-distended   Extremities:   No edema, extremities cold with mottling lower extremities, pale and ashen and no cyanosis evident    Pulses:   Radial pulses palpable and equal bilaterally          Results Review:     I reviewed the patient's new clinical  results.    Medication Reviewed.    Assessment & Plan       Alzheimer's dementia, late onset, with behavioral disturbance (HCC)    Hospice care patient    Coronary artery disease involving native coronary artery of native heart without angina pectoris    Stage 3b chronic kidney disease (HCC)    Closed left hip fracture, with routine healing, subsequent encounter    Left ventricular diastolic dysfunction    Benign essential HTN    Adult hypothyroidism    Cerebrovascular small vessel disease    Cerebral atrophy (HCC)      No family present at the time of my evaluation.  I reviewed with the RN.  With medications previously administered, the patient appears comfortable.  The patient has required glycopyrrolate for airway congestion.  The patient has required 4 doses of 1.5 mg Dilaudid, 6 doses yesterday, and 4 doses of 2 mg IV Ativan, 6 doses yesterday, thus far today.  Medications will be continued and adjusted as needed for symptom management for comfort.  Continued gradual decline evident.    Continue the same therapy as outlined.    Plan for disposition:  SATURNINO Polk MD  Hospice and Palliative Medicine  06/11/22  17:52 EDT

## 2022-06-11 NOTE — PLAN OF CARE
Goal Outcome Evaluation:              Outcome Evaluation: Patient continues to decline with increase in coolness, mottling and edema to arms and feet, unable to obtain b/p or O2 saturation.  Premedicated prior to turns with dilaudid, ativan and robinul.  Spoke with son on phone with update to patient status, son responded will try to visit.  Comfort care measures maintained.
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
## 2022-06-12 NOTE — DISCHARGE SUMMARY
Discharge As      Date of Admisssion:  2022  Date of Death:  2022  Time of Death:  11:04 PM    Patient Care Team:  Salvador Polk MD as Attending Provider (Hospice and Palliative Medicine)    Final Diagnosis:     Alzheimer's dementia, late onset, with behavioral disturbance (HCC)    Hospice care patient    Coronary artery disease involving native coronary artery of native heart without angina pectoris    Stage 3b chronic kidney disease (HCC)    Closed left hip fracture, with routine healing, subsequent encounter    Left ventricular diastolic dysfunction    Benign essential HTN    Adult hypothyroidism    Cerebrovascular small vessel disease    Cerebral atrophy (HCC)    Death in hospice      Hospital Course  Patient was a 89 y.o. female who has known dementia, gout, chronic kidney disease stage IV, hypertension, and hypothyroidism who presented to [facility] ED [facility]22 after falling at her nursing home and injuring her left hip. She was found to have a left hip fracture and a urinary tract infection from E. coli  and was admitted to the hospital for further evaluation and treatment.       She was treated with a 5 day course of ceftriaxone for the urinary tract infection and was seen by orthopedic surgery in consultation for the hip fracture. She was taken for left hemiarthroplasty by Dr. Mayfield on 22. Unfortunately, her post operative course was complicated by severe behavioral disturbance related to her dementia. Psychiatry followed and adjusted medications, but ultimately after a discussion with palliative care, the family decided to pursue hospice [facility]. The patient was transferred to [facility] and treated with opiates and benzodiazepines for pain and anxiety with the hope of possibly returning home. Unfortunately, in order to keep her pain and agitation under control, she required keeping her relatively sedated and so she was evaluated by hospice and all agreed to  discharge her from acute care and readmit her as a hospice scattered bed patient.     During the hospital course, the patient remained comfortable with medications provided.  Slow gradual decline noted.  Please see my note from earlier today, 6/11/2022 at 1605 hrs.  Subsequently, I was called that the patient's respirations ceased and no pulse palpable. No heart sounds audible. I pronounced the patient at 2304 hours.    Time: I spent 25 minutes on this discharge activity which included: face-to-face encounter with the patient, reviewing the data in the system, coordination of the care with the nursing staff as well as documentation and entering orders.       Salvador Polk MD  Hospice and Palliative Medicine  06/12/22  06:54 EDT

## 2022-06-13 NOTE — PROGRESS NOTES
Case Management Discharge Note      Final Note: The patient  on 22 @  23:04. B. Kiara RN, CCP.         Selected Continued Care - Discharged on 2022 Admission date: 2022 - Discharge disposition:     Destination Coordination complete.    Service Provider Selected Services Address Phone Fax Patient Preferred    [facility]  [facility] [address], [place] [id number] [phone] --          Durable Medical Equipment    No services have been selected for the patient.              Dialysis/Infusion    No services have been selected for the patient.              Home Medical Care    No services have been selected for the patient.              Therapy    No services have been selected for the patient.              Community Resources    No services have been selected for the patient.              Community & DME    No services have been selected for the patient.                Selected Continued Care - Prior Encounters Includes selections from prior encounters from 2022 to 2022    Discharged on 2022 Admission date: 2022 - Discharge disposition: Hospice/Medical Facility (DC - External)    Destination     Service Provider Selected Services Address Phone Fax Patient Preferred    [facility]  [facility] [address], [place] [id number] [phone] --                         Final Discharge Disposition Code: 41 -  in medical facility

## (undated) DEVICE — PREP SOL POVIDONE/IODINE BT 4OZ

## (undated) DEVICE — SUT PDS LP 0 CTX VIO 60IN Z990G

## (undated) DEVICE — PK HIP TOTL 40

## (undated) DEVICE — SUT VIC 2/0 CT2 27IN J269H

## (undated) DEVICE — PREP IM ENCHANCED TOTAL HIP BONE                                    PREPARATION KIT: Brand: PREP-IM

## (undated) DEVICE — PAD,ABDOMINAL,8"X10",ST,LF: Brand: MEDLINE

## (undated) DEVICE — TBG PENCL TELESCP MEGADYNE SMOKE EVAC 10FT

## (undated) DEVICE — MAT FLR ABSORBENT LG 4FT 10 2.5FT

## (undated) DEVICE — IMMOB KN 3PNL DLX CANVS 22IN BLU

## (undated) DEVICE — GLV SURG BIOGEL LTX PF 8 1/2

## (undated) DEVICE — HANDPIECE SET WITH COAXIAL HIGH FLOW TIP AND SUCTION TUBE: Brand: INTERPULSE

## (undated) DEVICE — CEMENT MIXING SYSTEM WITH FEMORAL BREAKWAY NOZZLE: Brand: REVOLUTION

## (undated) DEVICE — TRAP FLD MINIVAC MEGADYNE 100ML

## (undated) DEVICE — SOL ISO/ALC RUB 70PCT 4OZ

## (undated) DEVICE — DRAPE,REIN 53X77,STERILE: Brand: MEDLINE

## (undated) DEVICE — APPL CHLORAPREP HI/LITE 26ML ORNG

## (undated) DEVICE — ANTIBACTERIAL UNDYED BRAIDED (POLYGLACTIN 910), SYNTHETIC ABSORBABLE SUTURE: Brand: COATED VICRYL

## (undated) DEVICE — SKIN PREP TRAY W/CHG: Brand: MEDLINE INDUSTRIES, INC.

## (undated) DEVICE — GLV SURG SIGNATURE ESSENTIAL PF LTX SZ8.5

## (undated) DEVICE — SUT TEVDEX 5 K61 30IN 79745

## (undated) DEVICE — DRSNG WND GZ CURAD OIL EMULSION 3X8IN LF STRL 1PK

## (undated) DEVICE — SPNG GZ WOVN 4X4IN 12PLY 10/BX STRL